# Patient Record
Sex: FEMALE | Race: WHITE
[De-identification: names, ages, dates, MRNs, and addresses within clinical notes are randomized per-mention and may not be internally consistent; named-entity substitution may affect disease eponyms.]

---

## 2018-04-11 ENCOUNTER — HOSPITAL ENCOUNTER (INPATIENT)
Dept: HOSPITAL 63 - GEROPSY | Age: 73
LOS: 24 days | Discharge: HOME HEALTH | DRG: 885 | End: 2018-05-05
Attending: PSYCHIATRY & NEUROLOGY | Admitting: PSYCHIATRY & NEUROLOGY
Payer: MEDICARE

## 2018-04-11 VITALS — HEIGHT: 60 IN | WEIGHT: 96.06 LBS | BODY MASS INDEX: 18.86 KG/M2

## 2018-04-11 VITALS — SYSTOLIC BLOOD PRESSURE: 172 MMHG | DIASTOLIC BLOOD PRESSURE: 75 MMHG

## 2018-04-11 DIAGNOSIS — F63.9: ICD-10-CM

## 2018-04-11 DIAGNOSIS — F41.9: ICD-10-CM

## 2018-04-11 DIAGNOSIS — F02.81: ICD-10-CM

## 2018-04-11 DIAGNOSIS — G40.409: ICD-10-CM

## 2018-04-11 DIAGNOSIS — J44.9: ICD-10-CM

## 2018-04-11 DIAGNOSIS — Z91.19: ICD-10-CM

## 2018-04-11 DIAGNOSIS — F10.10: ICD-10-CM

## 2018-04-11 DIAGNOSIS — G62.9: ICD-10-CM

## 2018-04-11 DIAGNOSIS — F01.51: ICD-10-CM

## 2018-04-11 DIAGNOSIS — E78.5: ICD-10-CM

## 2018-04-11 DIAGNOSIS — Z87.01: ICD-10-CM

## 2018-04-11 DIAGNOSIS — I10: ICD-10-CM

## 2018-04-11 DIAGNOSIS — G30.0: ICD-10-CM

## 2018-04-11 DIAGNOSIS — Z79.899: ICD-10-CM

## 2018-04-11 DIAGNOSIS — F42.9: ICD-10-CM

## 2018-04-11 DIAGNOSIS — F33.3: Primary | ICD-10-CM

## 2018-04-11 DIAGNOSIS — R45.851: ICD-10-CM

## 2018-04-11 DIAGNOSIS — N39.0: ICD-10-CM

## 2018-04-11 LAB
ALBUMIN SERPL-MCNC: 3.8 G/DL (ref 3.4–5)
ALBUMIN/GLOB SERPL: 1.2 {RATIO} (ref 1–1.7)
ALP SERPL-CCNC: 84 U/L (ref 46–116)
ALT SERPL-CCNC: 18 U/L (ref 14–59)
ANION GAP SERPL CALC-SCNC: 11 MMOL/L (ref 6–14)
APTT PPP: YELLOW S
AST SERPL-CCNC: 15 U/L (ref 15–37)
BACTERIA #/AREA URNS HPF: (no result) /HPF
BASOPHILS # BLD AUTO: 0 X10^3/UL (ref 0–0.2)
BASOPHILS NFR BLD: 1 % (ref 0–3)
BILIRUB SERPL-MCNC: 0.2 MG/DL (ref 0.2–1)
BILIRUB UR QL STRIP: (no result)
BUN/CREAT SERPL: 25 (ref 6–20)
CA-I SERPL ISE-MCNC: 20 MG/DL (ref 7–20)
CALCIUM SERPL-MCNC: 9.7 MG/DL (ref 8.5–10.1)
CHLORIDE SERPL-SCNC: 101 MMOL/L (ref 98–107)
CO2 SERPL-SCNC: 26 MMOL/L (ref 21–32)
CREAT SERPL-MCNC: 0.8 MG/DL (ref 0.6–1)
EOSINOPHIL NFR BLD: 0.1 X10^3/UL (ref 0–0.7)
EOSINOPHIL NFR BLD: 2 % (ref 0–3)
ERYTHROCYTE [DISTWIDTH] IN BLOOD BY AUTOMATED COUNT: 17.9 % (ref 11.5–14.5)
FIBRINOGEN PPP-MCNC: (no result) MG/DL
GFR SERPLBLD BASED ON 1.73 SQ M-ARVRAT: 70.7 ML/MIN
GLOBULIN SER-MCNC: 3.3 G/DL (ref 2.2–3.8)
GLUCOSE SERPL-MCNC: 92 MG/DL (ref 70–99)
GLUCOSE UR STRIP-MCNC: (no result) MG/DL
HCT VFR BLD CALC: 34.8 % (ref 36–47)
HGB BLD-MCNC: 11.8 G/DL (ref 12–15.5)
HYALINE CASTS #/AREA URNS LPF: (no result) /HPF
LYMPHOCYTES # BLD: 0.6 X10^3/UL (ref 1–4.8)
LYMPHOCYTES NFR BLD AUTO: 13 % (ref 24–48)
MAGNESIUM SERPL-MCNC: 2 MG/DL (ref 1.8–2.4)
MCH RBC QN AUTO: 33 PG (ref 25–35)
MCHC RBC AUTO-ENTMCNC: 34 G/DL (ref 31–37)
MCV RBC AUTO: 97 FL (ref 79–100)
MONO #: 0.5 X10^3/UL (ref 0–1.1)
MONOCYTES NFR BLD: 11 % (ref 0–9)
NEUT #: 3.1 X10^3UL (ref 1.8–7.7)
NEUTROPHILS NFR BLD AUTO: 72 % (ref 31–73)
NITRITE UR QL STRIP: (no result)
PLATELET # BLD AUTO: 258 X10^3/UL (ref 140–400)
POTASSIUM SERPL-SCNC: 4.3 MMOL/L (ref 3.5–5.1)
PROT SERPL-MCNC: 7.1 G/DL (ref 6.4–8.2)
RBC # BLD AUTO: 3.59 X10^6/UL (ref 3.5–5.4)
RBC #/AREA URNS HPF: (no result) /HPF (ref 0–2)
SODIUM SERPL-SCNC: 138 MMOL/L (ref 136–145)
SP GR UR STRIP: 1.01
SQUAMOUS #/AREA URNS LPF: (no result) /LPF
UROBILINOGEN UR-MCNC: 0.2 MG/DL
WBC # BLD AUTO: 4.2 X10^3/UL (ref 4–11)
WBC #/AREA URNS HPF: (no result) /HPF (ref 0–4)
YEAST #/AREA URNS HPF: PRESENT /HPF

## 2018-04-11 PROCEDURE — 84443 ASSAY THYROID STIM HORMONE: CPT

## 2018-04-11 PROCEDURE — 82947 ASSAY GLUCOSE BLOOD QUANT: CPT

## 2018-04-11 PROCEDURE — 81001 URINALYSIS AUTO W/SCOPE: CPT

## 2018-04-11 PROCEDURE — 84480 ASSAY TRIIODOTHYRONINE (T3): CPT

## 2018-04-11 PROCEDURE — 80053 COMPREHEN METABOLIC PANEL: CPT

## 2018-04-11 PROCEDURE — 36415 COLL VENOUS BLD VENIPUNCTURE: CPT

## 2018-04-11 PROCEDURE — 95816 EEG AWAKE AND DROWSY: CPT

## 2018-04-11 PROCEDURE — 74176 CT ABD & PELVIS W/O CONTRAST: CPT

## 2018-04-11 PROCEDURE — 83605 ASSAY OF LACTIC ACID: CPT

## 2018-04-11 PROCEDURE — 86593 SYPHILIS TEST NON-TREP QUANT: CPT

## 2018-04-11 PROCEDURE — 80048 BASIC METABOLIC PNL TOTAL CA: CPT

## 2018-04-11 PROCEDURE — 87186 SC STD MICRODIL/AGAR DIL: CPT

## 2018-04-11 PROCEDURE — 71045 X-RAY EXAM CHEST 1 VIEW: CPT

## 2018-04-11 PROCEDURE — 85025 COMPLETE CBC W/AUTO DIFF WBC: CPT

## 2018-04-11 PROCEDURE — 83540 ASSAY OF IRON: CPT

## 2018-04-11 PROCEDURE — 80061 LIPID PANEL: CPT

## 2018-04-11 PROCEDURE — 84436 ASSAY OF TOTAL THYROXINE: CPT

## 2018-04-11 PROCEDURE — 82306 VITAMIN D 25 HYDROXY: CPT

## 2018-04-11 PROCEDURE — 83550 IRON BINDING TEST: CPT

## 2018-04-11 PROCEDURE — 80183 DRUG SCRN QUANT OXCARBAZEPIN: CPT

## 2018-04-11 PROCEDURE — 83735 ASSAY OF MAGNESIUM: CPT

## 2018-04-11 PROCEDURE — 80175 DRUG SCREEN QUAN LAMOTRIGINE: CPT

## 2018-04-11 PROCEDURE — 82607 VITAMIN B-12: CPT

## 2018-04-11 PROCEDURE — 74018 RADEX ABDOMEN 1 VIEW: CPT

## 2018-04-11 PROCEDURE — 82803 BLOOD GASES ANY COMBINATION: CPT

## 2018-04-11 PROCEDURE — 83036 HEMOGLOBIN GLYCOSYLATED A1C: CPT

## 2018-04-11 PROCEDURE — 87086 URINE CULTURE/COLONY COUNT: CPT

## 2018-04-11 RX ADMIN — DICYCLOMINE HYDROCHLORIDE SCH MG: 10 CAPSULE ORAL at 22:49

## 2018-04-11 RX ADMIN — DONEPEZIL HYDROCHLORIDE SCH MG: 5 TABLET, FILM COATED ORAL at 22:49

## 2018-04-11 RX ADMIN — LAMOTRIGINE SCH MG: 150 TABLET ORAL at 22:49

## 2018-04-11 RX ADMIN — SULFAMETHOXAZOLE AND TRIMETHOPRIM SCH TAB: 800; 160 TABLET ORAL at 22:49

## 2018-04-11 NOTE — PDOC
Exam


Note:


Sanchez Note:


Please also refer to the separate dictated note~for this date of service 

dictated separately.~Patient seen individually. Discussed the patient with 

Nursing staff reviewed the chart.~Reviewed interim history and current 

functioning. Reviewed vital signs,~Labs/ Radiology~and current medications 

noted below. Continue current treatment with the changes noted in the dictated 

addendum note





Assessment:


Vital Signs:





 Vital Signs








  Date Time  Temp Pulse Resp B/P (MAP) Pulse Ox O2 Delivery O2 Flow Rate FiO2


 


4/11/18 19:56 98.0 67 20 172/75 (107) 94   











Current Medications:


Meds:





Current Medications


Acetaminophen (Tylenol) 650 mg PRN Q6HRS  PRN PO PAIN / TEMP;  Start 4/11/18 at 

19:30


Multi-Ingredient Ointment (Analgesic Balm) 1 logan PRN QID  PRN TP MUSCLE PAIN;  

Start 4/11/18 at 19:30


Al Hydroxide/Mg Hydroxide (Mylanta Plus Xs) 15 ml PRN AFTMEALHC  PRN PO 

DYSPEPSIA;  Start 4/11/18 at 19:30


Magnesium Hydroxide (Milk Of Magnesia) 2,400 mg PRN QHS  PRN PO CONSTIPATION;  

Start 4/11/18 at 19:30


I have reviewed the current psychotropics carefully including drug 

interactions.  Risk benefit ratio favors no change other than as noted in my 

dictated progress note.











NEGAR DANIEL MD Apr 11, 2018 20:51

## 2018-04-12 VITALS — SYSTOLIC BLOOD PRESSURE: 141 MMHG | DIASTOLIC BLOOD PRESSURE: 71 MMHG

## 2018-04-12 VITALS — DIASTOLIC BLOOD PRESSURE: 85 MMHG | SYSTOLIC BLOOD PRESSURE: 159 MMHG

## 2018-04-12 VITALS — DIASTOLIC BLOOD PRESSURE: 74 MMHG | SYSTOLIC BLOOD PRESSURE: 179 MMHG

## 2018-04-12 LAB
CHOLEST SERPL-MCNC: 253 MG/DL (ref 0–200)
CHOLEST/HDLC SERPL: 2 {RATIO}
HDLC SERPL-MCNC: 104 MG/DL (ref 40–60)
LDLC: 135 MG/DL (ref 0–100)
T3 SERPL-MCNC: 71 NG/DL (ref 71–180)
T4 SERPL-MCNC: 5.9 UG/DL (ref 4.5–12)
THYROID STIM HORMONE (TSH): 1.38 UIU/ML (ref 0.36–3.74)
TRIGL SERPL-MCNC: 74 MG/DL (ref 0–150)
VLDLC: 14 MG/DL (ref 0–40)

## 2018-04-12 RX ADMIN — Medication SCH CAP: at 10:28

## 2018-04-12 RX ADMIN — DONEPEZIL HYDROCHLORIDE SCH MG: 5 TABLET, FILM COATED ORAL at 20:04

## 2018-04-12 RX ADMIN — ACETAMINOPHEN PRN MG: 325 TABLET, FILM COATED ORAL at 10:52

## 2018-04-12 RX ADMIN — Medication SCH CAP: at 20:05

## 2018-04-12 RX ADMIN — LOSARTAN POTASSIUM SCH MG: 50 TABLET, FILM COATED ORAL at 10:29

## 2018-04-12 RX ADMIN — ACETAMINOPHEN PRN MG: 325 TABLET, FILM COATED ORAL at 21:22

## 2018-04-12 RX ADMIN — SULFAMETHOXAZOLE AND TRIMETHOPRIM SCH TAB: 800; 160 TABLET ORAL at 20:05

## 2018-04-12 RX ADMIN — FLUTICASONE PROPIONATE SCH SPRAY: 50 SPRAY, METERED NASAL at 10:30

## 2018-04-12 RX ADMIN — MINOXIDIL SCH MG: 2.5 TABLET ORAL at 10:30

## 2018-04-12 RX ADMIN — SULFAMETHOXAZOLE AND TRIMETHOPRIM SCH TAB: 800; 160 TABLET ORAL at 10:28

## 2018-04-12 RX ADMIN — PANTOPRAZOLE SODIUM SCH MG: 40 TABLET, DELAYED RELEASE ORAL at 10:29

## 2018-04-12 RX ADMIN — NICOTINE SCH PATCH: 14 PATCH, EXTENDED RELEASE TOPICAL at 10:28

## 2018-04-12 RX ADMIN — LAMOTRIGINE SCH MG: 150 TABLET ORAL at 10:27

## 2018-04-12 RX ADMIN — DICYCLOMINE HYDROCHLORIDE SCH MG: 10 CAPSULE ORAL at 10:28

## 2018-04-12 RX ADMIN — CITALOPRAM HYDROBROMIDE SCH MG: 20 TABLET ORAL at 10:28

## 2018-04-12 RX ADMIN — LAMOTRIGINE SCH MG: 150 TABLET ORAL at 20:05

## 2018-04-12 RX ADMIN — DICYCLOMINE HYDROCHLORIDE SCH MG: 10 CAPSULE ORAL at 20:05

## 2018-04-12 NOTE — HP
ADMIT DATE:  04/12/2018



This note covers elements not covered in my initial note of 04/12/2018.  The

patient was seen individually evening of 04/12/2018.  Discussed with nursing

staff, reviewed the chart.  Previously, I had discussed with nursing staff on 2

or 3 occasions to review history resulting in this referral to us from [facility].  The patient was initially sent to the Emergency Room

at [facility] on the [address] and then admitted to inpatient service before we were

called on account of her suicidal ideation.



IDENTIFYING DATA:  The patient is a 72-year-old  female who resides at

[facility].  She has been increasingly confused, recently

getting combative, hitting, scratching at staff, verbally abusive to staff.  She

banged her head on the wall and made suicidal statements.  She said she had a

suicidal plan.  Has been noted to be very needy, disruptive, attention seeking,

depressed, psychotic and confused.  She has failed outpatient psychiatric

interventions resulting in this referral.



CHIEF COMPLAINT:  "I get frustrated very easily."



HISTORY OF PRESENT ILLNESS:  The patient has a history of worsening symptoms of

depression, feeling hopeless, helpless, worthless, paranoia, marked agitation,

mood swings, worsening confusion.  She has had some sleep and appetite changes,

appeared paranoid.  No active homicidal ideation.  No clear history of bipolar

disorder.  She has been increasingly confused.



PAST PSYCHIATRIC HISTORY:  As above.



MEDICAL HISTORY:  COPD, hypertension, hyperlipidemia, neuropathy, seizure

disorder, history of UTIs, history of pneumonia.  



ACCU-CHEKS:  None.



DIET:  Regular.  Takes her medications whole, ambulates wheelchair with 1 person

assist.



CODE STATUS:  Full code.



DRUG ALLERGIES:  Negative.



CURRENT PSYCHOTROPICS:  Aricept 5 mg at bedtime, Lexapro 10 mg a day, Ativan

0.75 mg b.i.d. and 1 mg at bedtime, Zyprexa 5 mg b.i.d. p.r.n.



FAMILY HISTORY:  Noncontributory.



SOCIAL HISTORY:  Positive history of alcohol abuse, though she is unable to tell

me details as I questioned her on this.  She states she worked and lived most of

her life in [facility] moved to this area to get away from the crowded space

in that area.  No physical, sexual or elder abuse history is noted.  She is not

known to be a perpetrator.



MENTAL STATUS EXAMINATION:  The patient was seen individually evening of

04/12/2018.  She is oriented to herself and situation, knew that she was

admitted the previous evening, knew it was 04/2018.  Short term memory

nevertheless is somewhat impaired.  Attention span short.  Mood is depressed,

anxious.  She is paranoid, suspicious.  Denies active suicidal ideation. 

Attention span short.  Language function intact.  



REACTION TO HOSPITALIZATION:  The patient accepting of this.



ASSETS:  Stable living at the above facility, supportive family.



IMPRESSION:  Major depressive disorder, recurrent, severe with psychotic

features; major neurocognitive disorder, early Alzheimer, vascular with

delusion, depression; anxiety disorder, unspecified; impulse control disorder,

unspecified.  Rest as above.



PLAN:  Admit to geropsychiatry unit at [facility].  I will see the

patient daily individually from a psychiatric standpoint.  Medical followup per

Dr. Nelson/Dr Garrison.  Continue current psychotropics, observe baseline and

adjust as clinically indicated.





______________________________

MAN IRINEO DANIEL MD



DR:  REMY/cherrie  JOB#:  [id number] / [id number]

DD:  04/12/2018 18:38  DT:  04/12/2018 19:05

## 2018-04-12 NOTE — PDOC
Exam


Note:


Sanchez Note:


Please also refer to the separate dictated note~for this date of service 

dictated separately.~Patient seen individually. Discussed the patient with 

Nursing staff reviewed the chart.~Reviewed interim history and current 

functioning. Reviewed vital signs,~Labs/ Radiology~and current medications 

noted below. Continue current treatment with the changes noted in the dictated 

addendum note





Assessment:


Vital Signs:





 Vital Signs








  Date Time  Temp Pulse Resp B/P (MAP) Pulse Ox O2 Delivery O2 Flow Rate FiO2


 


4/12/18 15:36 97.6 84 16 141/71 (94) 95   








I&O











Intake and Output 


 


 4/12/18





 07:00


 


Intake Total 240 ml


 


Output Total 800 ml


 


Balance -560 ml


 


 


 


Intake Oral 240 ml


 


Output Urine Total 800 ml


 


# Voids 1


 


# Bowel Movements 1








Labs:





 Laboratory Tests








Test


  4/11/18


19:50 4/11/18


21:00


 


White Blood Count


  4.2 x10^3/uL


(4.0-11.0) 


 


 


Red Blood Count


  3.59 x10^6/uL


(3.50-5.40) 


 


 


Hemoglobin


  11.8 g/dL


(12.0-15.5)  L 


 


 


Hematocrit


  34.8 %


(36.0-47.0)  L 


 


 


Mean Corpuscular Volume


  97 fL ()


  


 


 


Mean Corpuscular Hemoglobin 33 pg (25-35)   


 


Mean Corpuscular Hemoglobin


Concent 34 g/dL


(31-37) 


 


 


Red Cell Distribution Width


  17.9 %


(11.5-14.5)  H 


 


 


Platelet Count


  258 x10^3/uL


(140-400) 


 


 


Neutrophils (%) (Auto) 72 % (31-73)   


 


Lymphocytes (%) (Auto) 13 % (24-48)  L 


 


Monocytes (%) (Auto) 11 % (0-9)  H 


 


Eosinophils (%) (Auto) 2 % (0-3)   


 


Basophils (%) (Auto) 1 % (0-3)   


 


Neutrophils # (Auto)


  3.1 x10^3uL


(1.8-7.7) 


 


 


Lymphocytes # (Auto)


  0.6 x10^3/uL


(1.0-4.8)  L 


 


 


Monocytes # (Auto)


  0.5 x10^3/uL


(0.0-1.1) 


 


 


Eosinophils # (Auto)


  0.1 x10^3/uL


(0.0-0.7) 


 


 


Basophils # (Auto)


  0.0 x10^3/uL


(0.0-0.2) 


 


 


Sodium Level


  138 mmol/L


(136-145) 


 


 


Potassium Level


  4.3 mmol/L


(3.5-5.1) 


 


 


Chloride Level


  101 mmol/L


() 


 


 


Carbon Dioxide Level


  26 mmol/L


(21-32) 


 


 


Anion Gap 11 (6-14)   


 


Blood Urea Nitrogen


  20 mg/dL


(7-20) 


 


 


Creatinine


  0.8 mg/dL


(0.6-1.0) 


 


 


Estimated GFR


(Cockcroft-Gault) 70.7  


  


 


 


BUN/Creatinine Ratio 25 (6-20)  H 


 


Glucose Level


  92 mg/dL


(70-99) 


 


 


Calcium Level


  9.7 mg/dL


(8.5-10.1) 


 


 


Magnesium Level


  2.0 mg/dL


(1.8-2.4) 


 


 


Total Bilirubin


  0.2 mg/dL


(0.2-1.0) 


 


 


Aspartate Amino Transferase


(AST) 15 U/L (15-37)


  


 


 


Alanine Aminotransferase (ALT)


  18 U/L (14-59)


  


 


 


Alkaline Phosphatase


  84 U/L


() 


 


 


Total Protein


  7.1 g/dL


(6.4-8.2) 


 


 


Albumin


  3.8 g/dL


(3.4-5.0) 


 


 


Albumin/Globulin Ratio 1.2 (1.0-1.7)   


 


Urine Collection Type  Unknown  


 


Urine Color  Yellow  


 


Urine Clarity  Hazy  


 


Urine pH  7.0  


 


Urine Specific Gravity  1.015  


 


Urine Protein


  


  Neg


(NEG-TRACE)


 


Urine Glucose (UA)


  


  Neg mg/dL


(NEG)


 


Urine Ketones (Stick)


  


  Neg mg/dL


(NEG)


 


Urine Blood  Trace (NEG)  


 


Urine Nitrite  Pos (NEG)  


 


Urine Bilirubin  Neg (NEG)  


 


Urine Urobilinogen Dipstick


  


  0.2 mg/dL (0.2


mg/dL)


 


Urine Leukocyte Esterase  Small (NEG)  


 


Urine RBC


  


  3-5 /HPF (0-2)


 


 


Urine WBC


  


  5-10 /HPF


(0-4)


 


Urine Squamous Epithelial


Cells 


  Few /LPF  


 


 


Urine Bacteria


  


  Mod /HPF


(0-FEW)


 


Urine Hyaline Casts  Few /HPF  


 


Urine Mucus  Slight /LPF  


 


Urine Yeast  Present /HPF  











Current Medications:


Meds:





Current Medications


Acetaminophen (Tylenol) 650 mg PRN Q6HRS  PRN PO PAIN / TEMP Last administered 

on 4/12/18at 10:52;  Start 4/11/18 at 19:30


Multi-Ingredient Ointment (Analgesic Balm) 1 logan PRN QID  PRN TP MUSCLE PAIN;  

Start 4/11/18 at 19:30


Al Hydroxide/Mg Hydroxide (Mylanta Plus Xs) 15 ml PRN AFTMEALHC  PRN PO 

DYSPEPSIA;  Start 4/11/18 at 19:30


Magnesium Hydroxide (Milk Of Magnesia) 2,400 mg PRN QHS  PRN PO CONSTIPATION;  

Start 4/11/18 at 19:30


Donepezil HCl (Aricept) 5 mg HS PO  Last administered on 4/11/18at 22:49;  

Start 4/11/18 at 23:00


Lorazepam (Ativan) 0.75 mg BID94 PO  Last administered on 4/12/18at 16:03;  

Start 4/12/18 at 09:00


Lorazepam (Ativan) 1 mg HS PO  Last administered on 4/11/18at 22:49;  Start 4/11 /18 at 23:00


Olanzapine (ZyPREXA) 5 mg PRN BID  PRN PO ANXIETY / AGITATION;  Start 4/11/18 

at 22:00


Dicyclomine HCl (Bentyl) 10 mg BID PO  Last administered on 4/12/18at 10:28;  

Start 4/11/18 at 23:00


Fluticasone Propionate (Flonase) 2 spray DAILY NS  Last administered on 4/12/ 18at 10:30;  Start 4/12/18 at 09:00


Albuterol/ Ipratropium (Duoneb) 3 ml PRN Q4HRS  PRN NEB SHORTNESS OF BREATH;  

Start 4/11/18 at 22:00


Lamotrigine (LaMICtal) 150 mg BID PO  Last administered on 4/12/18at 10:27;  

Start 4/11/18 at 23:00


Losartan Potassium (Cozaar) 50 mg DAILY PO  Last administered on 4/12/18at 10:29

;  Start 4/12/18 at 09:00


Minoxidil (Loniten) 10 mg DAILY PO  Last administered on 4/12/18at 10:30;  

Start 4/12/18 at 09:00


Nicotine (Nicoderm Cq 14mg) 1 patch DAILY TD  Last administered on 4/12/18at 10:

28;  Start 4/12/18 at 09:00


Oxcarbazepine (Trileptal) 600 mg BID PO  Last administered on 4/12/18at 10:28;  

Start 4/12/18 at 09:00


Pantoprazole Sodium (Protonix) 40 mg DAILY PO  Last administered on 4/12/18at 10

:29;  Start 4/12/18 at 09:00


Trimethoprim/ Sulfamethoxazole (Bactrim Ds) 1 tab BID PO  Last administered on 4 /12/18at 10:28;  Start 4/11/18 at 23:00


Citalopram Hydrobromide (CeleXA) 20 mg DAILY PO  Last administered on 4/12/18at 

10:28;  Start 4/12/18 at 09:00


Lactobacillus Rhamnosus (Culturelle) 1 cap BID PO  Last administered on 4/12/ 18at 10:28;  Start 4/12/18 at 09:00





Active Scripts


Active


Reported


Bactrim Ds Tablet (Sulfamethoxazole/Trimethoprim) 1 Each Tablet 1 Each PO BID


Pantoprazole Sodium 40 Mg Tablet.dr 40 Mg PO DAILY


Trileptal (Oxcarbazepine) 300 Mg Tablet 600 Mg PO BID


Zyprexa (Olanzapine) 5 Mg Tablet 5 Mg PO PRN BID PRN


NICODERM CQ 14mg (Nicotine) 1 Each Patch.td24 1 Patch TD DAILY


Minoxidil 2.5 Mg Tablet 10 Mg PO DAILY


Cozaar (Losartan Potassium) 50 Mg Tablet 50 Mg PO DAILY


Lorazepam 1 Mg Tablet 1 Mg PO HS


Lorazepam 0.5 Mg Tablet 0.75 Mg PO BID94


Lamictal (Lamotrigine) 150 Mg Tablet 150 Mg PO BID


Probiotic (Lactobacillus Acidophilus) 1 Each Capsule 1 Each PO TIDWMEALS


Duoneb 0.5-3(2.5) Mg/3 Ml (Albuterol/Ipratropium) 3 Ml Ampul.neb 3 Ml NEB PRN 

Q4HRS PRN


Fluticasone Propionate Nasal Spray (Fluticasone Propionate) 16 Gm Spray.susp 2 

[place] NS DAILY


Escitalopram Oxalate 10 Mg Tablet 10 Mg PO DAILY


Donepezil Hcl 5 Mg Tablet 5 Mg PO HS


Dicyclomine Hcl 10 Mg Capsule 10 Mg PO BID


I have reviewed the current psychotropics carefully including drug 

interactions.  Risk benefit ratio favors no change other than as noted in my 

dictated progress note.





Diagnosis:


Problems:  


(1) Anxiety disorder


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


(3) Dementia in Alzheimer's disease with depression


(4) Impulse control disorder


(5) Dementia, vascular, with delusions


(6) Dementia, vascular, with depression


(7) Major depressive disorder, recurrent episode


(8) Psychotic depression











NEGAR DANIEL MD Apr 12, 2018 18:28

## 2018-04-12 NOTE — PDOC
Exam


Note:


Sanchez Note:


Please also refer to the separate dictated note~for this date of service 

dictated separately.~Patient seen individually. Discussed the patient with 

Nursing staff reviewed the chart.~Reviewed interim history and current 

functioning. Reviewed vital signs,~Labs/ Radiology~and current medications 

noted below. Continue current treatment with the changes noted in the dictated 

addendum note





Assessment:


Vital Signs:





 Vital Signs








  Date Time  Temp Pulse Resp B/P (MAP) Pulse Ox O2 Delivery O2 Flow Rate FiO2


 


4/12/18 15:36 97.6 84 16 141/71 (94) 95   








I&O











Intake and Output 


 


 4/12/18





 07:00


 


Intake Total 240 ml


 


Output Total 800 ml


 


Balance -560 ml


 


 


 


Intake Oral 240 ml


 


Output Urine Total 800 ml


 


# Voids 1


 


# Bowel Movements 1








Labs:





 Laboratory Tests








Test


  4/11/18


21:00


 


Urine Collection Type Unknown  


 


Urine Color Yellow  


 


Urine Clarity Hazy  


 


Urine pH 7.0  


 


Urine Specific Gravity 1.015  


 


Urine Protein


  Neg


(NEG-TRACE)


 


Urine Glucose (UA)


  Neg mg/dL


(NEG)


 


Urine Ketones (Stick)


  Neg mg/dL


(NEG)


 


Urine Blood Trace (NEG)  


 


Urine Nitrite Pos (NEG)  


 


Urine Bilirubin Neg (NEG)  


 


Urine Urobilinogen Dipstick


  0.2 mg/dL (0.2


mg/dL)


 


Urine Leukocyte Esterase Small (NEG)  


 


Urine RBC


  3-5 /HPF (0-2)


 


 


Urine WBC


  5-10 /HPF


(0-4)


 


Urine Squamous Epithelial


Cells Few /LPF  


 


 


Urine Bacteria


  Mod /HPF


(0-FEW)


 


Urine Hyaline Casts Few /HPF  


 


Urine Mucus Slight /LPF  


 


Urine Yeast Present /HPF  











Current Medications:


Meds:





Current Medications


Acetaminophen (Tylenol) 650 mg PRN Q6HRS  PRN PO PAIN / TEMP Last administered 

on 4/12/18at 10:52;  Start 4/11/18 at 19:30


Multi-Ingredient Ointment (Analgesic Balm) 1 logan PRN QID  PRN TP MUSCLE PAIN;  

Start 4/11/18 at 19:30


Al Hydroxide/Mg Hydroxide (Mylanta Plus Xs) 15 ml PRN AFTMEALHC  PRN PO 

DYSPEPSIA;  Start 4/11/18 at 19:30


Magnesium Hydroxide (Milk Of Magnesia) 2,400 mg PRN QHS  PRN PO CONSTIPATION;  

Start 4/11/18 at 19:30


Donepezil HCl (Aricept) 5 mg HS PO  Last administered on 4/12/18at 20:04;  

Start 4/11/18 at 23:00


Lorazepam (Ativan) 0.75 mg BID94 PO  Last administered on 4/12/18at 16:03;  

Start 4/12/18 at 09:00


Lorazepam (Ativan) 1 mg HS PO  Last administered on 4/12/18at 20:04;  Start 4/11 /18 at 23:00


Olanzapine (ZyPREXA) 5 mg PRN BID  PRN PO ANXIETY / AGITATION;  Start 4/11/18 

at 22:00


Dicyclomine HCl (Bentyl) 10 mg BID PO  Last administered on 4/12/18at 20:05;  

Start 4/11/18 at 23:00


Fluticasone Propionate (Flonase) 2 spray DAILY NS  Last administered on 4/12/ 18at 10:30;  Start 4/12/18 at 09:00


Albuterol/ Ipratropium (Duoneb) 3 ml PRN Q4HRS  PRN NEB SHORTNESS OF BREATH;  

Start 4/11/18 at 22:00


Lamotrigine (LaMICtal) 150 mg BID PO  Last administered on 4/12/18at 20:05;  

Start 4/11/18 at 23:00


Losartan Potassium (Cozaar) 50 mg DAILY PO  Last administered on 4/12/18at 10:29

;  Start 4/12/18 at 09:00


Minoxidil (Loniten) 10 mg DAILY PO  Last administered on 4/12/18at 10:30;  

Start 4/12/18 at 09:00


Nicotine (Nicoderm Cq 14mg) 1 patch DAILY TD  Last administered on 4/12/18at 10:

28;  Start 4/12/18 at 09:00


Oxcarbazepine (Trileptal) 600 mg BID PO  Last administered on 4/12/18at 20:05;  

Start 4/12/18 at 09:00


Pantoprazole Sodium (Protonix) 40 mg DAILY PO  Last administered on 4/12/18at 10

:29;  Start 4/12/18 at 09:00


Trimethoprim/ Sulfamethoxazole (Bactrim Ds) 1 tab BID PO  Last administered on 4 /12/18at 20:05;  Start 4/11/18 at 23:00


Citalopram Hydrobromide (CeleXA) 20 mg DAILY PO  Last administered on 4/12/18at 

10:28;  Start 4/12/18 at 09:00


Lactobacillus Rhamnosus (Culturelle) 1 cap BID PO  Last administered on 4/12/ 18at 20:05;  Start 4/12/18 at 09:00





Active Scripts


Active


Reported


Bactrim Ds Tablet (Sulfamethoxazole/Trimethoprim) 1 Each Tablet 1 Each PO BID


Pantoprazole Sodium 40 Mg Tablet.dr 40 Mg PO DAILY


Trileptal (Oxcarbazepine) 300 Mg Tablet 600 Mg PO BID


Zyprexa (Olanzapine) 5 Mg Tablet 5 Mg PO PRN BID PRN


NICODERM CQ 14mg (Nicotine) 1 Each Patch.td24 1 Patch TD DAILY


Minoxidil 2.5 Mg Tablet 10 Mg PO DAILY


Cozaar (Losartan Potassium) 50 Mg Tablet 50 Mg PO DAILY


Lorazepam 1 Mg Tablet 1 Mg PO HS


Lorazepam 0.5 Mg Tablet 0.75 Mg PO BID94


Lamictal (Lamotrigine) 150 Mg Tablet 150 Mg PO BID


Probiotic (Lactobacillus Acidophilus) 1 Each Capsule 1 Each PO TIDWMEALS


Duoneb 0.5-3(2.5) Mg/3 Ml (Albuterol/Ipratropium) 3 Ml Ampul.neb 3 Ml NEB PRN 

Q4HRS PRN


Fluticasone Propionate Nasal Spray (Fluticasone Propionate) 16 Gm Spray.susp 2 

[place] NS DAILY


Escitalopram Oxalate 10 Mg Tablet 10 Mg PO DAILY


Donepezil Hcl 5 Mg Tablet 5 Mg PO HS


Dicyclomine Hcl 10 Mg Capsule 10 Mg PO BID


I have reviewed the current psychotropics carefully including drug 

interactions.  Risk benefit ratio favors no change other than as noted in my 

dictated progress note.





Diagnosis:


Problems:  


(1) Anxiety disorder


(2) Impulse control disorder


(3) Psychotic depression


(4) Major depressive disorder, recurrent episode


(5) Dementia, vascular, with depression


(6) Dementia, vascular, with delusions


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











NEGAR DANIEL MD Apr 12, 2018 20:52

## 2018-04-13 VITALS — DIASTOLIC BLOOD PRESSURE: 76 MMHG | SYSTOLIC BLOOD PRESSURE: 162 MMHG

## 2018-04-13 VITALS — DIASTOLIC BLOOD PRESSURE: 92 MMHG | SYSTOLIC BLOOD PRESSURE: 146 MMHG

## 2018-04-13 LAB — HBA1C MFR BLD: 4.8 % (ref 4.8–5.6)

## 2018-04-13 RX ADMIN — LAMOTRIGINE SCH MG: 150 TABLET ORAL at 19:42

## 2018-04-13 RX ADMIN — ACETAMINOPHEN PRN MG: 325 TABLET, FILM COATED ORAL at 20:12

## 2018-04-13 RX ADMIN — DONEPEZIL HYDROCHLORIDE SCH MG: 5 TABLET, FILM COATED ORAL at 19:42

## 2018-04-13 RX ADMIN — FLUTICASONE PROPIONATE SCH SPRAY: 50 SPRAY, METERED NASAL at 09:02

## 2018-04-13 RX ADMIN — SULFAMETHOXAZOLE AND TRIMETHOPRIM SCH TAB: 800; 160 TABLET ORAL at 09:00

## 2018-04-13 RX ADMIN — Medication SCH CAP: at 09:00

## 2018-04-13 RX ADMIN — Medication SCH CAP: at 19:42

## 2018-04-13 RX ADMIN — CITALOPRAM HYDROBROMIDE SCH MG: 20 TABLET ORAL at 09:01

## 2018-04-13 RX ADMIN — CIPROFLOXACIN HYDROCHLORIDE SCH MG: 250 TABLET, FILM COATED ORAL at 19:44

## 2018-04-13 RX ADMIN — NICOTINE SCH PATCH: 14 PATCH, EXTENDED RELEASE TOPICAL at 09:00

## 2018-04-13 RX ADMIN — FLUTICASONE PROPIONATE SCH SPRAY: 50 SPRAY, METERED NASAL at 09:00

## 2018-04-13 RX ADMIN — LAMOTRIGINE SCH MG: 150 TABLET ORAL at 09:01

## 2018-04-13 RX ADMIN — PANTOPRAZOLE SODIUM SCH MG: 40 TABLET, DELAYED RELEASE ORAL at 09:00

## 2018-04-13 RX ADMIN — ACETAMINOPHEN PRN MG: 325 TABLET, FILM COATED ORAL at 10:20

## 2018-04-13 RX ADMIN — DICYCLOMINE HYDROCHLORIDE SCH MG: 10 CAPSULE ORAL at 19:42

## 2018-04-13 RX ADMIN — DICYCLOMINE HYDROCHLORIDE SCH MG: 10 CAPSULE ORAL at 09:00

## 2018-04-13 RX ADMIN — LOSARTAN POTASSIUM SCH MG: 50 TABLET, FILM COATED ORAL at 09:01

## 2018-04-13 RX ADMIN — AMOXICILLIN SCH MG: 250 CAPSULE ORAL at 19:44

## 2018-04-13 RX ADMIN — MINOXIDIL SCH MG: 2.5 TABLET ORAL at 09:02

## 2018-04-13 NOTE — PDOC
Exam


Note:


Sanchez Note:


Please also refer to the separate dictated note~for this date of service 

dictated separately.~Patient seen individually. Discussed the patient with 

Nursing staff reviewed the chart.~Reviewed interim history and current 

functioning. Reviewed vital signs,~Labs/ Radiology~and current medications 

noted below. Continue current treatment with the changes noted in the dictated 

addendum note





Assessment:


Vital Signs:





 Vital Signs








  Date Time  Temp Pulse Resp B/P (MAP) Pulse Ox O2 Delivery O2 Flow Rate FiO2


 


4/13/18 16:13 99.0 86 16 146/92 (110) 95   








I&O











Intake and Output 


 


 4/13/18





 07:00


 


Intake Total 600 ml


 


Balance 600 ml


 


 


 


Intake Oral 600 ml


 


# Bowel Movements 1











Current Medications:


Meds:





Current Medications


Acetaminophen (Tylenol) 650 mg PRN Q6HRS  PRN PO PAIN / TEMP Last administered 

on 4/13/18at 20:12;  Start 4/11/18 at 19:30


Multi-Ingredient Ointment (Analgesic Balm) 1 logan PRN QID  PRN TP MUSCLE PAIN;  

Start 4/11/18 at 19:30


Al Hydroxide/Mg Hydroxide (Mylanta Plus Xs) 15 ml PRN AFTMEALHC  PRN PO 

DYSPEPSIA;  Start 4/11/18 at 19:30


Magnesium Hydroxide (Milk Of Magnesia) 2,400 mg PRN QHS  PRN PO CONSTIPATION;  

Start 4/11/18 at 19:30


Donepezil HCl (Aricept) 5 mg HS PO  Last administered on 4/13/18at 19:42;  

Start 4/11/18 at 23:00


Lorazepam (Ativan) 0.75 mg BID94 PO  Last administered on 4/13/18at 16:31;  

Start 4/12/18 at 09:00


Lorazepam (Ativan) 1 mg HS PO  Last administered on 4/12/18at 20:04;  Start 4/11 /18 at 23:00;  Stop 4/13/18 at 18:25;  Status DC


Olanzapine (ZyPREXA) 5 mg PRN BID  PRN PO ANXIETY / AGITATION;  Start 4/11/18 

at 22:00


Dicyclomine HCl (Bentyl) 10 mg BID PO  Last administered on 4/13/18at 19:42;  

Start 4/11/18 at 23:00


Fluticasone Propionate (Flonase) 2 spray DAILY NS  Last administered on 4/12/ 18at 10:30;  Start 4/12/18 at 09:00


Albuterol/ Ipratropium (Duoneb) 3 ml PRN Q4HRS  PRN NEB SHORTNESS OF BREATH;  

Start 4/11/18 at 22:00


Lamotrigine (LaMICtal) 150 mg BID PO  Last administered on 4/13/18at 19:42;  

Start 4/11/18 at 23:00


Losartan Potassium (Cozaar) 50 mg DAILY PO  Last administered on 4/13/18at 09:01

;  Start 4/12/18 at 09:00


Minoxidil (Loniten) 10 mg DAILY PO  Last administered on 4/13/18at 09:02;  

Start 4/12/18 at 09:00


Nicotine (Nicoderm Cq 14mg) 1 patch DAILY TD  Last administered on 4/12/18at 10:

28;  Start 4/12/18 at 09:00


Oxcarbazepine (Trileptal) 600 mg BID PO  Last administered on 4/13/18at 19:42;  

Start 4/12/18 at 09:00


Pantoprazole Sodium (Protonix) 40 mg DAILY PO  Last administered on 4/13/18at 09

:00;  Start 4/12/18 at 09:00


Trimethoprim/ Sulfamethoxazole (Bactrim Ds) 1 tab BID PO  Last administered on 4 /13/18at 09:00;  Start 4/11/18 at 23:00;  Stop 4/13/18 at 16:19;  Status DC


Citalopram Hydrobromide (CeleXA) 20 mg DAILY PO  Last administered on 4/13/18at 

09:01;  Start 4/12/18 at 09:00


Lactobacillus Rhamnosus (Culturelle) 1 cap BID PO  Last administered on 4/13/ 18at 19:42;  Start 4/12/18 at 09:00


Amoxicillin (Amoxil) 500 mg [id number] PO  Last administered on 4/13/18at 19:44;  

Start 4/13/18 at 21:00


Ciprofloxacin (Cipro) 250 mg BID PO  Last administered on 4/13/18at 19:44;  

Start 4/13/18 at 21:00


Lactobacillus Rhamnosus (Culturelle) 1 cap BID PO ;  Start 4/13/18 at 21:00;  

Stop 4/13/18 at 21:00;  Status DC


Lorazepam (Ativan) 0.75 mg HS PO ;  Start 4/14/18 at 21:00


Lorazepam (Ativan) 1 mg QHS PO  Last administered on 4/13/18at 19:42;  Start 4/ 13/18 at 21:00;  Stop 4/14/18 at 20:30





Active Scripts


Active


Reported


Bactrim Ds Tablet (Sulfamethoxazole/Trimethoprim) 1 Each Tablet 1 Each PO BID


Pantoprazole Sodium 40 Mg Tablet.dr 40 Mg PO DAILY


Trileptal (Oxcarbazepine) 300 Mg Tablet 600 Mg PO BID


Zyprexa (Olanzapine) 5 Mg Tablet 5 Mg PO PRN BID PRN


NICODERM CQ 14mg (Nicotine) 1 Each Patch.td24 1 Patch TD DAILY


Minoxidil 2.5 Mg Tablet 10 Mg PO DAILY


Cozaar (Losartan Potassium) 50 Mg Tablet 50 Mg PO DAILY


Lorazepam 1 Mg Tablet 1 Mg PO HS


Lorazepam 0.5 Mg Tablet 0.75 Mg PO [id number]


Lamictal (Lamotrigine) 150 Mg Tablet 150 Mg PO BID


Probiotic (Lactobacillus Acidophilus) 1 Each Capsule 1 Each PO TIDWMEALS


Duoneb 0.5-3(2.5) Mg/3 Ml (Albuterol/Ipratropium) 3 Ml Ampul.neb 3 Ml NEB PRN 

Q4HRS PRN


Fluticasone Propionate Nasal Spray (Fluticasone Propionate) 16 Gm Spray.susp 2 

[place] NS DAILY


Escitalopram Oxalate 10 Mg Tablet 10 Mg PO DAILY


Donepezil Hcl 5 Mg Tablet 5 Mg PO HS


Dicyclomine Hcl 10 Mg Capsule 10 Mg PO BID


I have reviewed the current psychotropics carefully including drug 

interactions.  Risk benefit ratio favors no change other than as noted in my 

dictated progress note.





Diagnosis:


Problems:  


(1) Anxiety disorder


(2) Impulse control disorder


(3) Psychotic depression


(4) Major depressive disorder, recurrent episode


(5) Dementia, vascular, with depression


(6) Dementia, vascular, with delusions


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











NEGAR DANIEL MD Apr 13, 2018 20:47

## 2018-04-13 NOTE — CONS
DATE OF CONSULTATION:  04/12/2018



REASON FOR CONSULTATION:  Medical management.



HISTORY OF PRESENT ILLNESS:  The patient is a 72-year-old resident at [facility], who was admitted on the account of being

combative, hitting and scratching verbally abusive towards staff, banged her

head on the wall and made suicidal attempt, has suicidal ideation plan, very

needy, disruptive and attention seeking, all this in a background of dementia

with depression and behavioral disturbances.



PAST MEDICAL HISTORY:  Significant for COPD, hypertension, hyperlipidemia,

neuropathy, seizure disorder, history of urinary tract infection, history of

pneumonia.



PAST PSYCHIATRIC HISTORY:  Significant for dementia with depression with

significant behavioral disturbances.



PAST SURGICAL HISTORY:  Unobtainable.



FAMILY HISTORY:  Unremarkable



SOCIAL HISTORY:  She is a resident at [facility].  She does not smoke, drink alcohol or use any recreational drugs.



REVIEW OF SYSTEMS:  Unobtainable.



PHYSICAL EXAMINATION:

GENERAL:  On examining her, she was sitting comfortably in her chair, eating her

supper, in no apparent distress, was pale, but no jaundice, cyanosis or

thyromegaly.  No jugular venous distension.  No lower limb edema.

VITAL SIGNS:  Her heart rate was 84, blood pressure 141/71, temperature was

97.6, respiratory rate was 16, and oxygen saturation was 95% on room air.

HEAD, EYES, EARS, NOSE AND THROAT:  Showed normocephalic, atraumatic.

NECK:  Supple.

HEART:  Showed normal first and second heart sounds with no gallop, rub or

murmur.

CHEST:  Clear to auscultation.  No crepitation or rhonchi.

ABDOMEN:  Distended, soft, nontender.

NEUROLOGIC:  She is demented, but without any obvious lateralizing sign.  All

cranial nerves are intact.

EXTREMITIES:  She moves extremities without difficulty.



LABORATORY DATA:  As of this morning showed white cell count was 4200,

hemoglobin 11.8, hematocrit 34, MCV 97 and platelet count 258,000.  Her serum

sodium was 138, potassium 4.3, chloride was 101, bicarbonate 26, anion gap of

11, BUN 20, creatinine 0.8.  Estimated GFR was 70 mL per minute.  Her glucose

was 92.  Calcium was 9.7.  Magnesium 2.  Total bilirubin, AST, ALT, alkaline

phosphatase were normal.  Total protein was 7.1, albumin was 3.8 and urinalysis

showed the urine was yellow, hazy with a pH of 7, specific gravity 1.017.  The

urine was negative for protein, glucose and ketones.  There was trace amount of

blood.  Urine was positive for nitrite, negative for bilirubin.  There was small

amount of leukocyte esterase, 3-5 rbc's, 5-10 wbc's.  There was moderate amount

of urine.



IMPRESSION:  In summary, this is a 72-year-old  female patient, who was

admitted on account of being combative, hitting and scratching, verbally abusive

towards the staff, banging her head on the wall and made suicidal attempts,

suicidal ideation and very needy, very disruptive, aggressive, and attention

seeking.  So far, all her vital signs and her lab works are stable.  I would

continue with current plan of management and decide further management

accordingly.



4/13/2018Thank you Dr. Booth for allowing me to participate in the care of this

patient.





______________________________

LUMA PLASCENCIA MD



DR:  LUPE/cherrie  JOB#:  [id number] / [id number]

DD:  04/12/2018 17:32  DT:  04/13/2018 01:42

## 2018-04-14 VITALS — DIASTOLIC BLOOD PRESSURE: 86 MMHG | SYSTOLIC BLOOD PRESSURE: 126 MMHG

## 2018-04-14 VITALS — SYSTOLIC BLOOD PRESSURE: 194 MMHG | DIASTOLIC BLOOD PRESSURE: 104 MMHG

## 2018-04-14 VITALS — DIASTOLIC BLOOD PRESSURE: 74 MMHG | SYSTOLIC BLOOD PRESSURE: 136 MMHG

## 2018-04-14 RX ADMIN — FLUTICASONE PROPIONATE SCH SPRAY: 50 SPRAY, METERED NASAL at 09:00

## 2018-04-14 RX ADMIN — AMOXICILLIN SCH MG: 250 CAPSULE ORAL at 14:24

## 2018-04-14 RX ADMIN — CITALOPRAM HYDROBROMIDE SCH MG: 20 TABLET ORAL at 08:39

## 2018-04-14 RX ADMIN — Medication SCH CAP: at 08:39

## 2018-04-14 RX ADMIN — ACETAMINOPHEN PRN MG: 325 TABLET, FILM COATED ORAL at 06:30

## 2018-04-14 RX ADMIN — LAMOTRIGINE SCH MG: 150 TABLET ORAL at 19:48

## 2018-04-14 RX ADMIN — AMOXICILLIN SCH MG: 250 CAPSULE ORAL at 08:39

## 2018-04-14 RX ADMIN — Medication SCH CAP: at 19:48

## 2018-04-14 RX ADMIN — FLUTICASONE PROPIONATE SCH SPRAY: 50 SPRAY, METERED NASAL at 08:39

## 2018-04-14 RX ADMIN — DONEPEZIL HYDROCHLORIDE SCH MG: 5 TABLET, FILM COATED ORAL at 19:48

## 2018-04-14 RX ADMIN — PANTOPRAZOLE SODIUM SCH MG: 40 TABLET, DELAYED RELEASE ORAL at 08:39

## 2018-04-14 RX ADMIN — CIPROFLOXACIN HYDROCHLORIDE SCH MG: 250 TABLET, FILM COATED ORAL at 19:48

## 2018-04-14 RX ADMIN — ACETAMINOPHEN PRN MG: 325 TABLET, FILM COATED ORAL at 19:48

## 2018-04-14 RX ADMIN — ACETAMINOPHEN PRN MG: 325 TABLET, FILM COATED ORAL at 12:51

## 2018-04-14 RX ADMIN — MINOXIDIL SCH MG: 2.5 TABLET ORAL at 06:27

## 2018-04-14 RX ADMIN — DICYCLOMINE HYDROCHLORIDE SCH MG: 10 CAPSULE ORAL at 19:48

## 2018-04-14 RX ADMIN — LOSARTAN POTASSIUM SCH MG: 50 TABLET, FILM COATED ORAL at 06:26

## 2018-04-14 RX ADMIN — CIPROFLOXACIN HYDROCHLORIDE SCH MG: 250 TABLET, FILM COATED ORAL at 08:38

## 2018-04-14 RX ADMIN — NICOTINE SCH PATCH: 14 PATCH, EXTENDED RELEASE TOPICAL at 08:40

## 2018-04-14 RX ADMIN — LAMOTRIGINE SCH MG: 150 TABLET ORAL at 08:39

## 2018-04-14 RX ADMIN — AMOXICILLIN SCH MG: 250 CAPSULE ORAL at 19:48

## 2018-04-14 RX ADMIN — DICYCLOMINE HYDROCHLORIDE SCH MG: 10 CAPSULE ORAL at 08:38

## 2018-04-14 RX ADMIN — NICOTINE SCH PATCH: 14 PATCH, EXTENDED RELEASE TOPICAL at 09:00

## 2018-04-14 NOTE — PDOC
Exam


Note:


Sanchez Note:


Please also refer to the separate dictated note~for this date of service 

dictated separately.~Patient seen individually. Discussed the patient with 

Nursing staff reviewed the chart.~Reviewed interim history and current 

functioning. Reviewed vital signs,~Labs/ Radiology~and current medications 

noted below. Continue current treatment with the changes noted in the dictated 

addendum note





Assessment:


Vital Signs:





 Vital Signs








  Date Time  Temp Pulse Resp B/P (MAP) Pulse Ox O2 Delivery O2 Flow Rate FiO2


 


4/14/18 16:26 97.5 79 18 126/86 (99) 97 Room Air  








I&O











Intake and Output 


 


 4/14/18





 07:00


 


Intake Total 600 ml


 


Output Total 900 ml


 


Balance -300 ml


 


 


 


Intake Oral 600 ml


 


Output Urine Total 900 ml


 


# Bowel Movements 1











Current Medications:


Meds:





Current Medications


Acetaminophen (Tylenol) 650 mg PRN Q6HRS  PRN PO PAIN / TEMP Last administered 

on 4/14/18at 12:51;  Start 4/11/18 at 19:30


Multi-Ingredient Ointment (Analgesic Balm) 1 logan PRN QID  PRN TP MUSCLE PAIN;  

Start 4/11/18 at 19:30


Al Hydroxide/Mg Hydroxide (Mylanta Plus Xs) 15 ml PRN AFTMEALHC  PRN PO 

DYSPEPSIA;  Start 4/11/18 at 19:30


Magnesium Hydroxide (Milk Of Magnesia) 2,400 mg PRN QHS  PRN PO CONSTIPATION;  

Start 4/11/18 at 19:30


Donepezil HCl (Aricept) 5 mg HS PO  Last administered on 4/13/18at 19:42;  

Start 4/11/18 at 23:00


Lorazepam (Ativan) 0.75 mg BID94 PO  Last administered on 4/14/18at 16:13;  

Start 4/12/18 at 09:00


Lorazepam (Ativan) 1 mg HS PO  Last administered on 4/12/18at 20:04;  Start 4/11 /18 at 23:00;  Stop 4/13/18 at 18:25;  Status DC


Olanzapine (ZyPREXA) 5 mg PRN BID  PRN PO ANXIETY / AGITATION Last administered 

on 4/14/18at 14:24;  Start 4/11/18 at 22:00


Dicyclomine HCl (Bentyl) 10 mg BID PO  Last administered on 4/14/18at 08:38;  

Start 4/11/18 at 23:00


Fluticasone Propionate (Flonase) 2 spray DAILY NS  Last administered on 4/12/ 18at 10:30;  Start 4/12/18 at 09:00


Albuterol/ Ipratropium (Duoneb) 3 ml PRN Q4HRS  PRN NEB SHORTNESS OF BREATH;  

Start 4/11/18 at 22:00


Lamotrigine (LaMICtal) 150 mg BID PO  Last administered on 4/14/18at 08:39;  

Start 4/11/18 at 23:00


Losartan Potassium (Cozaar) 50 mg DAILY PO  Last administered on 4/14/18at 06:26

;  Start 4/12/18 at 09:00


Minoxidil (Loniten) 10 mg DAILY PO  Last administered on 4/14/18at 06:27;  

Start 4/12/18 at 09:00


Nicotine (Nicoderm Cq 14mg) 1 patch DAILY TD  Last administered on 4/12/18at 10:

28;  Start 4/12/18 at 09:00


Oxcarbazepine (Trileptal) 600 mg BID PO  Last administered on 4/14/18at 08:39;  

Start 4/12/18 at 09:00


Pantoprazole Sodium (Protonix) 40 mg DAILY PO  Last administered on 4/14/18at 08

:39;  Start 4/12/18 at 09:00


Trimethoprim/ Sulfamethoxazole (Bactrim Ds) 1 tab BID PO  Last administered on 4 /13/18at 09:00;  Start 4/11/18 at 23:00;  Stop 4/13/18 at 16:19;  Status DC


Citalopram Hydrobromide (CeleXA) 20 mg DAILY PO  Last administered on 4/14/18at 

08:39;  Start 4/12/18 at 09:00;  Stop 4/14/18 at 18:32;  Status DC


Lactobacillus Rhamnosus (Culturelle) 1 cap BID PO  Last administered on 4/14/ 18at 08:39;  Start 4/12/18 at 09:00


Amoxicillin (Amoxil) 500 mg [id number] PO  Last administered on 4/14/18at 14:24;  

Start 4/13/18 at 21:00


Ciprofloxacin (Cipro) 250 mg BID PO  Last administered on 4/14/18at 08:38;  

Start 4/13/18 at 21:00


Lactobacillus Rhamnosus (Culturelle) 1 cap BID PO ;  Start 4/13/18 at 21:00;  

Stop 4/13/18 at 21:00;  Status DC


Lorazepam (Ativan) 0.75 mg HS PO ;  Start 4/14/18 at 21:00


Lorazepam (Ativan) 1 mg QHS PO  Last administered on 4/13/18at 19:42;  Start 4/ 13/18 at 21:00;  Stop 4/14/18 at 20:30


Fluvoxamine Maleate (Luvox) 25 mg QHS PO ;  Start 4/14/18 at 21:00





Active Scripts


Active


Reported


Bactrim Ds Tablet (Sulfamethoxazole/Trimethoprim) 1 Each Tablet 1 Each PO BID


Pantoprazole Sodium 40 Mg Tablet.dr 40 Mg PO DAILY


Trileptal (Oxcarbazepine) 300 Mg Tablet 600 Mg PO BID


Zyprexa (Olanzapine) 5 Mg Tablet 5 Mg PO PRN BID PRN


NICODERM CQ 14mg (Nicotine) 1 Each Patch.td24 1 Patch TD DAILY


Minoxidil 2.5 Mg Tablet 10 Mg PO DAILY


Cozaar (Losartan Potassium) 50 Mg Tablet 50 Mg PO DAILY


Lorazepam 1 Mg Tablet 1 Mg PO HS


Lorazepam 0.5 Mg Tablet 0.75 Mg PO BID94


Lamictal (Lamotrigine) 150 Mg Tablet 150 Mg PO BID


Probiotic (Lactobacillus Acidophilus) 1 Each Capsule 1 Each PO TIDWMEALS


Duoneb 0.5-3(2.5) Mg/3 Ml (Albuterol/Ipratropium) 3 Ml Ampul.neb 3 Ml NEB PRN 

Q4HRS PRN


Fluticasone Propionate Nasal Spray (Fluticasone Propionate) 16 Gm Spray.susp 2 

[place] NS DAILY


Escitalopram Oxalate 10 Mg Tablet 10 Mg PO DAILY


Donepezil Hcl 5 Mg Tablet 5 Mg PO HS


Dicyclomine Hcl 10 Mg Capsule 10 Mg PO BID


I have reviewed the current psychotropics carefully including drug 

interactions.  Risk benefit ratio favors no change other than as noted in my 

dictated progress note.





Diagnosis:


Problems:  


(1) Anxiety disorder


(2) Impulse control disorder


(3) Psychotic depression


(4) Major depressive disorder, recurrent episode


(5) Dementia, vascular, with depression


(6) Dementia, vascular, with delusions


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











NEGAR DANIEL MD Apr 14, 2018 19:44

## 2018-04-15 VITALS — SYSTOLIC BLOOD PRESSURE: 128 MMHG | DIASTOLIC BLOOD PRESSURE: 55 MMHG

## 2018-04-15 VITALS — SYSTOLIC BLOOD PRESSURE: 137 MMHG | DIASTOLIC BLOOD PRESSURE: 60 MMHG

## 2018-04-15 RX ADMIN — LAMOTRIGINE SCH MG: 150 TABLET ORAL at 08:59

## 2018-04-15 RX ADMIN — DICYCLOMINE HYDROCHLORIDE SCH MG: 10 CAPSULE ORAL at 08:59

## 2018-04-15 RX ADMIN — ACETAMINOPHEN PRN MG: 325 TABLET, FILM COATED ORAL at 14:11

## 2018-04-15 RX ADMIN — LOSARTAN POTASSIUM SCH MG: 50 TABLET, FILM COATED ORAL at 09:00

## 2018-04-15 RX ADMIN — NICOTINE SCH PATCH: 14 PATCH, EXTENDED RELEASE TOPICAL at 09:00

## 2018-04-15 RX ADMIN — NICOTINE SCH PATCH: 14 PATCH, EXTENDED RELEASE TOPICAL at 09:01

## 2018-04-15 RX ADMIN — FLUTICASONE PROPIONATE SCH SPRAY: 50 SPRAY, METERED NASAL at 09:02

## 2018-04-15 RX ADMIN — AMOXICILLIN SCH MG: 250 CAPSULE ORAL at 08:59

## 2018-04-15 RX ADMIN — MINOXIDIL SCH MG: 2.5 TABLET ORAL at 09:00

## 2018-04-15 RX ADMIN — AMOXICILLIN SCH MG: 250 CAPSULE ORAL at 14:11

## 2018-04-15 RX ADMIN — PANTOPRAZOLE SODIUM SCH MG: 40 TABLET, DELAYED RELEASE ORAL at 08:59

## 2018-04-15 RX ADMIN — DONEPEZIL HYDROCHLORIDE SCH MG: 5 TABLET, FILM COATED ORAL at 20:00

## 2018-04-15 RX ADMIN — AMOXICILLIN SCH MG: 250 CAPSULE ORAL at 19:59

## 2018-04-15 RX ADMIN — Medication SCH CAP: at 08:59

## 2018-04-15 RX ADMIN — Medication SCH CAP: at 19:59

## 2018-04-15 RX ADMIN — CIPROFLOXACIN HYDROCHLORIDE SCH MG: 250 TABLET, FILM COATED ORAL at 09:00

## 2018-04-15 RX ADMIN — DICYCLOMINE HYDROCHLORIDE SCH MG: 10 CAPSULE ORAL at 20:00

## 2018-04-15 RX ADMIN — LAMOTRIGINE SCH MG: 150 TABLET ORAL at 19:58

## 2018-04-15 RX ADMIN — CIPROFLOXACIN HYDROCHLORIDE SCH MG: 250 TABLET, FILM COATED ORAL at 20:03

## 2018-04-15 RX ADMIN — ACETAMINOPHEN PRN MG: 325 TABLET, FILM COATED ORAL at 23:19

## 2018-04-15 NOTE — PDOC
Exam


Note:


Sanchez Note:


Please also refer to the separate dictated note~for this date of service 

dictated separately.~Patient seen individually. Discussed the patient with 

Nursing staff reviewed the chart.~Reviewed interim history and current 

functioning. Reviewed vital signs,~Labs/ Radiology~and current medications 

noted below. Continue current treatment with the changes noted in the dictated 

addendum note





Assessment:


Vital Signs:





 Vital Signs








  Date Time  Temp Pulse Resp B/P (MAP) Pulse Ox O2 Delivery O2 Flow Rate FiO2


 


4/15/18 16:47 97.8 98 18 128/55 (79) 97   


 


4/14/18 16:26      Room Air  








I&O











Intake and Output 


 


 4/15/18





 07:00


 


Intake Total 1320 ml


 


Output Total 50 ml


 


Balance 1270 ml


 


 


 


Intake Oral 1320 ml


 


Output Urine Total 50 ml


 


# Bowel Movements 1











Current Medications:


Meds:





Current Medications


Acetaminophen (Tylenol) 650 mg PRN Q6HRS  PRN PO PAIN / TEMP Last administered 

on 4/15/18at 14:11;  Start 4/11/18 at 19:30


Multi-Ingredient Ointment (Analgesic Balm) 1 logan PRN QID  PRN TP MUSCLE PAIN;  

Start 4/11/18 at 19:30


Al Hydroxide/Mg Hydroxide (Mylanta Plus Xs) 15 ml PRN AFTMEALHC  PRN PO 

DYSPEPSIA;  Start 4/11/18 at 19:30


Magnesium Hydroxide (Milk Of Magnesia) 2,400 mg PRN QHS  PRN PO CONSTIPATION;  

Start 4/11/18 at 19:30


Donepezil HCl (Aricept) 5 mg HS PO  Last administered on 4/15/18at 20:00;  

Start 4/11/18 at 23:00


Lorazepam (Ativan) 0.75 mg BID94 PO  Last administered on 4/15/18at 16:30;  

Start 4/12/18 at 09:00


Lorazepam (Ativan) 1 mg HS PO  Last administered on 4/12/18at 20:04;  Start 4/11 /18 at 23:00;  Stop 4/13/18 at 18:25;  Status DC


Olanzapine (ZyPREXA) 5 mg PRN BID  PRN PO ANXIETY / AGITATION Last administered 

on 4/15/18at 13:04;  Start 4/11/18 at 22:00


Dicyclomine HCl (Bentyl) 10 mg BID PO  Last administered on 4/15/18at 20:00;  

Start 4/11/18 at 23:00


Fluticasone Propionate (Flonase) 2 spray DAILY NS  Last administered on 4/15/

18at 09:02;  Start 4/12/18 at 09:00


Albuterol/ Ipratropium (Duoneb) 3 ml PRN Q4HRS  PRN NEB SHORTNESS OF BREATH;  

Start 4/11/18 at 22:00


Lamotrigine (LaMICtal) 150 mg BID PO  Last administered on 4/15/18at 19:58;  

Start 4/11/18 at 23:00


Losartan Potassium (Cozaar) 50 mg DAILY PO  Last administered on 4/15/18at 09:00

;  Start 4/12/18 at 09:00


Minoxidil (Loniten) 10 mg DAILY PO  Last administered on 4/15/18at 09:00;  

Start 4/12/18 at 09:00


Nicotine (Nicoderm Cq 14mg) 1 patch DAILY TD  Last administered on 4/12/18at 10:

28;  Start 4/12/18 at 09:00


Oxcarbazepine (Trileptal) 600 mg BID PO  Last administered on 4/15/18at 20:00;  

Start 4/12/18 at 09:00


Pantoprazole Sodium (Protonix) 40 mg DAILY PO  Last administered on 4/15/18at 08

:59;  Start 4/12/18 at 09:00


Trimethoprim/ Sulfamethoxazole (Bactrim Ds) 1 tab BID PO  Last administered on 4 /13/18at 09:00;  Start 4/11/18 at 23:00;  Stop 4/13/18 at 16:19;  Status DC


Citalopram Hydrobromide (CeleXA) 20 mg DAILY PO  Last administered on 4/14/18at 

08:39;  Start 4/12/18 at 09:00;  Stop 4/14/18 at 18:32;  Status DC


Lactobacillus Rhamnosus (Culturelle) 1 cap BID PO  Last administered on 4/15/

18at 19:59;  Start 4/12/18 at 09:00


Amoxicillin (Amoxil) 500 mg [id number] PO  Last administered on 4/15/18at 19:59;  

Start 4/13/18 at 21:00


Ciprofloxacin (Cipro) 250 mg BID PO  Last administered on 4/15/18at 20:03;  

Start 4/13/18 at 21:00


Lactobacillus Rhamnosus (Culturelle) 1 cap BID PO ;  Start 4/13/18 at 21:00;  

Stop 4/13/18 at 21:00;  Status DC


Lorazepam (Ativan) 0.75 mg HS PO  Last administered on 4/15/18at 21:08;  Start 4 /14/18 at 21:00


Lorazepam (Ativan) 1 mg QHS PO  Last administered on 4/13/18at 19:42;  Start 4/ 13/18 at 21:00;  Stop 4/14/18 at 20:30;  Status DC


Fluvoxamine Maleate (Luvox) 25 mg QHS PO  Last administered on 4/15/18at 19:59;

  Start 4/14/18 at 21:00





Active Scripts


Active


Reported


Bactrim Ds Tablet (Sulfamethoxazole/Trimethoprim) 1 Each Tablet 1 Each PO BID


Pantoprazole Sodium 40 Mg Tablet.dr 40 Mg PO DAILY


Trileptal (Oxcarbazepine) 300 Mg Tablet 600 Mg PO BID


Zyprexa (Olanzapine) 5 Mg Tablet 5 Mg PO PRN BID PRN


NICODERM CQ 14mg (Nicotine) 1 Each Patch.td24 1 Patch TD DAILY


Minoxidil 2.5 Mg Tablet 10 Mg PO DAILY


Cozaar (Losartan Potassium) 50 Mg Tablet 50 Mg PO DAILY


Lorazepam 1 Mg Tablet 1 Mg PO HS


Lorazepam 0.5 Mg Tablet 0.75 Mg PO BID94


Lamictal (Lamotrigine) 150 Mg Tablet 150 Mg PO BID


Probiotic (Lactobacillus Acidophilus) 1 Each Capsule 1 Each PO TIDWMEALS


Duoneb 0.5-3(2.5) Mg/3 Ml (Albuterol/Ipratropium) 3 Ml Ampul.neb 3 Ml NEB PRN 

Q4HRS PRN


Fluticasone Propionate Nasal Spray (Fluticasone Propionate) 16 Gm Spray.susp 2 

[place] NS DAILY


Escitalopram Oxalate 10 Mg Tablet 10 Mg PO DAILY


Donepezil Hcl 5 Mg Tablet 5 Mg PO HS


Dicyclomine Hcl 10 Mg Capsule 10 Mg PO BID


I have reviewed the current psychotropics carefully including drug 

interactions.  Risk benefit ratio favors no change other than as noted in my 

dictated progress note.





Diagnosis:


Problems:  


(1) Anxiety disorder


(2) Impulse control disorder


(3) Psychotic depression


(4) Major depressive disorder, recurrent episode


(5) Dementia, vascular, with depression


(6) Dementia, vascular, with delusions


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











NEGAR DANIEL MD Apr 15, 2018 22:09

## 2018-04-15 NOTE — PN
DATE:  04/13/2018



This late entry 04/13/2018 covers elements not covered in my initial note

04/13/2018.  I met with the patient evening of 04/13/2018.  The patient refused

some of her medications previous evening, dropped herself out of the bed the

previous evening, grabbing people, abusive, aggressive, disruptive.



REVIEW OF SYSTEMS:  Ambulation impaired, in beach chair.  No CV, , pulmonary,

eye system symptoms on review.



MENTAL STATUS EXAM:  Oriented to herself and at times to situation.  Speech

moderate latency, often responses monosyllabic.  Abstraction fair, computation

impaired, language function intact, attention span short.  Mood and affect

remain somewhat withdrawn.



LABORATORY DATA:  Reviewed.



IMPRESSION:  Major depressive disorder; major neurocognitive disorder, early

Alzheimer, vascular with depression, urinary tract infection.



PLAN:  The patient is on Cipro and Amoxil for UTI.  She is on Ativan 0.75 mg

b.i.d. and 1 mg at bedtime.  We will drop the bedtime dosage down to 0.75 mg in

an attempt to taper the Ativan.  Maintain Lexapro, Aricept along with Zyprexa

p.r.n.





______________________________

MAN IRINEO DANIEL MD



DR:  REMY/cherrie  JOB#:  [id number] / [id number]

DD:  04/15/2018 09:34  DT:  04/15/2018 23:39

## 2018-04-16 VITALS — DIASTOLIC BLOOD PRESSURE: 73 MMHG | SYSTOLIC BLOOD PRESSURE: 145 MMHG

## 2018-04-16 VITALS — DIASTOLIC BLOOD PRESSURE: 81 MMHG | SYSTOLIC BLOOD PRESSURE: 140 MMHG

## 2018-04-16 LAB
ALBUMIN SERPL-MCNC: 3.2 G/DL (ref 3.4–5)
ALBUMIN/GLOB SERPL: 1.1 {RATIO} (ref 1–1.7)
ALP SERPL-CCNC: 75 U/L (ref 46–116)
ALT SERPL-CCNC: 17 U/L (ref 14–59)
ANION GAP SERPL CALC-SCNC: 7 MMOL/L (ref 6–14)
AST SERPL-CCNC: 12 U/L (ref 15–37)
BASOPHILS # BLD AUTO: 0 X10^3/UL (ref 0–0.2)
BASOPHILS NFR BLD: 1 % (ref 0–3)
BILIRUB SERPL-MCNC: 0.2 MG/DL (ref 0.2–1)
BUN/CREAT SERPL: 33 (ref 6–20)
CA-I SERPL ISE-MCNC: 23 MG/DL (ref 7–20)
CALCIUM SERPL-MCNC: 8.9 MG/DL (ref 8.5–10.1)
CHLORIDE SERPL-SCNC: 103 MMOL/L (ref 98–107)
CO2 SERPL-SCNC: 27 MMOL/L (ref 21–32)
CREAT SERPL-MCNC: 0.7 MG/DL (ref 0.6–1)
EOSINOPHIL NFR BLD: 0.1 X10^3/UL (ref 0–0.7)
EOSINOPHIL NFR BLD: 3 % (ref 0–3)
ERYTHROCYTE [DISTWIDTH] IN BLOOD BY AUTOMATED COUNT: 17.5 % (ref 11.5–14.5)
GFR SERPLBLD BASED ON 1.73 SQ M-ARVRAT: 82.5 ML/MIN
GLOBULIN SER-MCNC: 2.9 G/DL (ref 2.2–3.8)
GLUCOSE SERPL-MCNC: 87 MG/DL (ref 70–99)
HCT VFR BLD CALC: 32.7 % (ref 36–47)
HGB BLD-MCNC: 11.2 G/DL (ref 12–15.5)
LYMPHOCYTES # BLD: 1.2 X10^3/UL (ref 1–4.8)
LYMPHOCYTES NFR BLD AUTO: 32 % (ref 24–48)
MCH RBC QN AUTO: 33 PG (ref 25–35)
MCHC RBC AUTO-ENTMCNC: 34 G/DL (ref 31–37)
MCV RBC AUTO: 96 FL (ref 79–100)
MONO #: 0.6 X10^3/UL (ref 0–1.1)
MONOCYTES NFR BLD: 16 % (ref 0–9)
NEUT #: 1.9 X10^3UL (ref 1.8–7.7)
NEUTROPHILS NFR BLD AUTO: 48 % (ref 31–73)
PLATELET # BLD AUTO: 251 X10^3/UL (ref 140–400)
POTASSIUM SERPL-SCNC: 4.6 MMOL/L (ref 3.5–5.1)
PROT SERPL-MCNC: 6.1 G/DL (ref 6.4–8.2)
RBC # BLD AUTO: 3.4 X10^6/UL (ref 3.5–5.4)
SODIUM SERPL-SCNC: 137 MMOL/L (ref 136–145)
WBC # BLD AUTO: 3.9 X10^3/UL (ref 4–11)

## 2018-04-16 RX ADMIN — ACETAMINOPHEN PRN MG: 325 TABLET, FILM COATED ORAL at 12:36

## 2018-04-16 RX ADMIN — DICYCLOMINE HYDROCHLORIDE SCH MG: 10 CAPSULE ORAL at 20:00

## 2018-04-16 RX ADMIN — AMOXICILLIN SCH MG: 250 CAPSULE ORAL at 09:22

## 2018-04-16 RX ADMIN — LAMOTRIGINE SCH MG: 150 TABLET ORAL at 09:21

## 2018-04-16 RX ADMIN — LOSARTAN POTASSIUM SCH MG: 50 TABLET, FILM COATED ORAL at 09:21

## 2018-04-16 RX ADMIN — CIPROFLOXACIN HYDROCHLORIDE SCH MG: 250 TABLET, FILM COATED ORAL at 20:00

## 2018-04-16 RX ADMIN — AMOXICILLIN SCH MG: 250 CAPSULE ORAL at 19:57

## 2018-04-16 RX ADMIN — DICYCLOMINE HYDROCHLORIDE SCH MG: 10 CAPSULE ORAL at 09:22

## 2018-04-16 RX ADMIN — PANTOPRAZOLE SODIUM SCH MG: 40 TABLET, DELAYED RELEASE ORAL at 09:21

## 2018-04-16 RX ADMIN — MINOXIDIL SCH MG: 2.5 TABLET ORAL at 09:21

## 2018-04-16 RX ADMIN — LOSARTAN POTASSIUM SCH MG: 50 TABLET, FILM COATED ORAL at 09:00

## 2018-04-16 RX ADMIN — NICOTINE SCH PATCH: 14 PATCH, EXTENDED RELEASE TOPICAL at 09:00

## 2018-04-16 RX ADMIN — AMOXICILLIN SCH MG: 250 CAPSULE ORAL at 12:36

## 2018-04-16 RX ADMIN — FLUTICASONE PROPIONATE SCH SPRAY: 50 SPRAY, METERED NASAL at 09:22

## 2018-04-16 RX ADMIN — DONEPEZIL HYDROCHLORIDE SCH MG: 5 TABLET, FILM COATED ORAL at 19:57

## 2018-04-16 RX ADMIN — CIPROFLOXACIN HYDROCHLORIDE SCH MG: 250 TABLET, FILM COATED ORAL at 09:21

## 2018-04-16 RX ADMIN — Medication SCH CAP: at 20:00

## 2018-04-16 RX ADMIN — LAMOTRIGINE SCH MG: 150 TABLET ORAL at 20:00

## 2018-04-16 RX ADMIN — MINOXIDIL SCH MG: 2.5 TABLET ORAL at 09:00

## 2018-04-16 RX ADMIN — Medication SCH CAP: at 09:21

## 2018-04-16 NOTE — PDOC
Exam


Note:


Sanchez Note:


Please also refer to the separate dictated note~for this date of service 

dictated separately.~Patient seen individually. Discussed the patient with 

Nursing staff reviewed the chart.~Reviewed interim history and current 

functioning. Reviewed vital signs,~Labs/ Radiology~and current medications 

noted below. Continue current treatment with the changes noted in the dictated 

addendum note





Assessment:


Vital Signs:





 Vital Signs








  Date Time  Temp Pulse Resp B/P (MAP) Pulse Ox O2 Delivery O2 Flow Rate FiO2


 


4/16/18 15:49 98.1 94 20 140/81 (100) 98   


 


4/14/18 16:26      Room Air  








I&O











Intake and Output 


 


 4/16/18





 07:00


 


Intake Total 960 ml


 


Output Total 1000 ml


 


Balance -40 ml


 


 


 


Intake Oral 960 ml


 


Output Urine Total 1000 ml








Labs:





 Laboratory Tests








Test


  4/16/18


06:39


 


White Blood Count


  3.9 x10^3/uL


(4.0-11.0)  L


 


Red Blood Count


  3.40 x10^6/uL


(3.50-5.40)  L


 


Hemoglobin


  11.2 g/dL


(12.0-15.5)  L


 


Hematocrit


  32.7 %


(36.0-47.0)  L


 


Mean Corpuscular Volume


  96 fL ()


 


 


Mean Corpuscular Hemoglobin 33 pg (25-35)  


 


Mean Corpuscular Hemoglobin


Concent 34 g/dL


(31-37)


 


Red Cell Distribution Width


  17.5 %


(11.5-14.5)  H


 


Platelet Count


  251 x10^3/uL


(140-400)


 


Neutrophils (%) (Auto) 48 % (31-73)  


 


Lymphocytes (%) (Auto) 32 % (24-48)  


 


Monocytes (%) (Auto) 16 % (0-9)  H


 


Eosinophils (%) (Auto) 3 % (0-3)  


 


Basophils (%) (Auto) 1 % (0-3)  


 


Neutrophils # (Auto)


  1.9 x10^3uL


(1.8-7.7)


 


Lymphocytes # (Auto)


  1.2 x10^3/uL


(1.0-4.8)


 


Monocytes # (Auto)


  0.6 x10^3/uL


(0.0-1.1)


 


Eosinophils # (Auto)


  0.1 x10^3/uL


(0.0-0.7)


 


Basophils # (Auto)


  0.0 x10^3/uL


(0.0-0.2)


 


Sodium Level


  137 mmol/L


(136-145)


 


Potassium Level


  4.6 mmol/L


(3.5-5.1)


 


Chloride Level


  103 mmol/L


()


 


Carbon Dioxide Level


  27 mmol/L


(21-32)


 


Anion Gap 7 (6-14)  


 


Blood Urea Nitrogen


  23 mg/dL


(7-20)  H


 


Creatinine


  0.7 mg/dL


(0.6-1.0)


 


Estimated GFR


(Cockcroft-Gault) 82.5  


 


 


BUN/Creatinine Ratio 33 (6-20)  H


 


Glucose Level


  87 mg/dL


(70-99)


 


Calcium Level


  8.9 mg/dL


(8.5-10.1)


 


Total Bilirubin


  0.2 mg/dL


(0.2-1.0)


 


Aspartate Amino Transferase


(AST) 12 U/L (15-37)


L


 


Alanine Aminotransferase (ALT)


  17 U/L (14-59)


 


 


Alkaline Phosphatase


  75 U/L


()


 


Total Protein


  6.1 g/dL


(6.4-8.2)  L


 


Albumin


  3.2 g/dL


(3.4-5.0)  L


 


Albumin/Globulin Ratio 1.1 (1.0-1.7)  











Current Medications:


Meds:





Current Medications


Acetaminophen (Tylenol) 650 mg PRN Q6HRS  PRN PO PAIN / TEMP Last administered 

on 4/16/18at 12:36;  Start 4/11/18 at 19:30


Multi-Ingredient Ointment (Analgesic Balm) 1 logan PRN QID  PRN TP MUSCLE PAIN;  

Start 4/11/18 at 19:30


Al Hydroxide/Mg Hydroxide (Mylanta Plus Xs) 15 ml PRN AFTMEALHC  PRN PO 

DYSPEPSIA;  Start 4/11/18 at 19:30


Magnesium Hydroxide (Milk Of Magnesia) 2,400 mg PRN QHS  PRN PO CONSTIPATION;  

Start 4/11/18 at 19:30


Donepezil HCl (Aricept) 5 mg HS PO  Last administered on 4/16/18at 19:57;  

Start 4/11/18 at 23:00


Lorazepam (Ativan) 0.75 mg BID94 PO  Last administered on 4/16/18at 16:42;  

Start 4/12/18 at 09:00


Lorazepam (Ativan) 1 mg HS PO  Last administered on 4/12/18at 20:04;  Start 4/11 /18 at 23:00;  Stop 4/13/18 at 18:25;  Status DC


Olanzapine (ZyPREXA) 5 mg PRN BID  PRN PO ANXIETY / AGITATION Last administered 

on 4/16/18at 13:32;  Start 4/11/18 at 22:00;  Stop 4/16/18 at 18:40;  Status DC


Dicyclomine HCl (Bentyl) 10 mg BID PO  Last administered on 4/16/18at 20:00;  

Start 4/11/18 at 23:00


Fluticasone Propionate (Flonase) 2 spray DAILY NS  Last administered on 4/16/ 18at 09:22;  Start 4/12/18 at 09:00


Albuterol/ Ipratropium (Duoneb) 3 ml PRN Q4HRS  PRN NEB SHORTNESS OF BREATH;  

Start 4/11/18 at 22:00


Lamotrigine (LaMICtal) 150 mg BID PO  Last administered on 4/16/18at 20:00;  

Start 4/11/18 at 23:00


Losartan Potassium (Cozaar) 50 mg DAILY PO  Last administered on 4/15/18at 09:00

;  Start 4/12/18 at 09:00


Minoxidil (Loniten) 10 mg DAILY PO  Last administered on 4/15/18at 09:00;  

Start 4/12/18 at 09:00


Nicotine (Nicoderm Cq 14mg) 1 patch DAILY TD  Last administered on 4/12/18at 10:

28;  Start 4/12/18 at 09:00


Oxcarbazepine (Trileptal) 600 mg BID PO  Last administered on 4/16/18at 20:01;  

Start 4/12/18 at 09:00


Pantoprazole Sodium (Protonix) 40 mg DAILY PO  Last administered on 4/16/18at 09

:21;  Start 4/12/18 at 09:00


Trimethoprim/ Sulfamethoxazole (Bactrim Ds) 1 tab BID PO  Last administered on 4 /13/18at 09:00;  Start 4/11/18 at 23:00;  Stop 4/13/18 at 16:19;  Status DC


Citalopram Hydrobromide (CeleXA) 20 mg DAILY PO  Last administered on 4/14/18at 

08:39;  Start 4/12/18 at 09:00;  Stop 4/14/18 at 18:32;  Status DC


Lactobacillus Rhamnosus (Culturelle) 1 cap BID PO  Last administered on 4/16/ 18at 20:00;  Start 4/12/18 at 09:00


Amoxicillin (Amoxil) 500 mg [id number] PO  Last administered on 4/16/18at 19:57;  

Start 4/13/18 at 21:00


Ciprofloxacin (Cipro) 250 mg BID PO  Last administered on 4/16/18at 20:00;  

Start 4/13/18 at 21:00


Lactobacillus Rhamnosus (Culturelle) 1 cap BID PO ;  Start 4/13/18 at 21:00;  

Stop 4/13/18 at 21:00;  Status DC


Lorazepam (Ativan) 0.75 mg HS PO  Last administered on 4/16/18at 19:59;  Start 4 /14/18 at 21:00


Lorazepam (Ativan) 1 mg QHS PO  Last administered on 4/13/18at 19:42;  Start 4/ 13/18 at 21:00;  Stop 4/14/18 at 20:30;  Status DC


Fluvoxamine Maleate (Luvox) 25 mg QHS PO  Last administered on 4/16/18at 20:01;

  Start 4/14/18 at 21:00;  Stop 4/16/18 at 23:00


Fluvoxamine Maleate (Luvox) 50 mg HS PO ;  Start 4/17/18 at 21:00


Olanzapine (ZyPREXA ZYDIS) 2.5 mg PRN Q2HR  PRN PO PSYCHOSIS;  Start 4/16/18 at 

18:45





Active Scripts


Active


Reported


Bactrim Ds Tablet (Sulfamethoxazole/Trimethoprim) 1 Each Tablet 1 Each PO BID


Pantoprazole Sodium 40 Mg Tablet.dr 40 Mg PO DAILY


Trileptal (Oxcarbazepine) 300 Mg Tablet 600 Mg PO BID


Zyprexa (Olanzapine) 5 Mg Tablet 5 Mg PO PRN BID PRN


NICODERM CQ 14mg (Nicotine) 1 Each Patch.td24 1 Patch TD DAILY


Minoxidil 2.5 Mg Tablet 10 Mg PO DAILY


Cozaar (Losartan Potassium) 50 Mg Tablet 50 Mg PO DAILY


Lorazepam 1 Mg Tablet 1 Mg PO HS


Lorazepam 0.5 Mg Tablet 0.75 Mg PO BID94


Lamictal (Lamotrigine) 150 Mg Tablet 150 Mg PO BID


Probiotic (Lactobacillus Acidophilus) 1 Each Capsule 1 Each PO TIDWMEALS


Duoneb 0.5-3(2.5) Mg/3 Ml (Albuterol/Ipratropium) 3 Ml Ampul.neb 3 Ml NEB PRN 

Q4HRS PRN


Fluticasone Propionate Nasal Spray (Fluticasone Propionate) 16 Gm Spray.susp 2 

[place] NS DAILY


Escitalopram Oxalate 10 Mg Tablet 10 Mg PO DAILY


Donepezil Hcl 5 Mg Tablet 5 Mg PO HS


Dicyclomine Hcl 10 Mg Capsule 10 Mg PO BID


I have reviewed the current psychotropics carefully including drug 

interactions.  Risk benefit ratio favors no change other than as noted in my 

dictated progress note.





Diagnosis:


Problems:  


(1) Anxiety disorder


(2) Impulse control disorder


(3) Psychotic depression


(4) Major depressive disorder, recurrent episode


(5) Dementia, vascular, with depression


(6) Dementia, vascular, with delusions


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











NEGAR DANIEL MD Apr 16, 2018 21:07

## 2018-04-17 VITALS — DIASTOLIC BLOOD PRESSURE: 85 MMHG | SYSTOLIC BLOOD PRESSURE: 148 MMHG

## 2018-04-17 VITALS — DIASTOLIC BLOOD PRESSURE: 83 MMHG | SYSTOLIC BLOOD PRESSURE: 182 MMHG

## 2018-04-17 RX ADMIN — AMOXICILLIN SCH MG: 250 CAPSULE ORAL at 14:30

## 2018-04-17 RX ADMIN — Medication SCH CAP: at 09:09

## 2018-04-17 RX ADMIN — DICYCLOMINE HYDROCHLORIDE SCH MG: 10 CAPSULE ORAL at 09:09

## 2018-04-17 RX ADMIN — CIPROFLOXACIN HYDROCHLORIDE SCH MG: 250 TABLET, FILM COATED ORAL at 20:18

## 2018-04-17 RX ADMIN — AMOXICILLIN SCH MG: 250 CAPSULE ORAL at 09:09

## 2018-04-17 RX ADMIN — PANTOPRAZOLE SODIUM SCH MG: 40 TABLET, DELAYED RELEASE ORAL at 09:10

## 2018-04-17 RX ADMIN — AMOXICILLIN SCH MG: 250 CAPSULE ORAL at 20:18

## 2018-04-17 RX ADMIN — DICYCLOMINE HYDROCHLORIDE SCH MG: 10 CAPSULE ORAL at 20:19

## 2018-04-17 RX ADMIN — LAMOTRIGINE SCH MG: 150 TABLET ORAL at 09:09

## 2018-04-17 RX ADMIN — ACETAMINOPHEN PRN MG: 325 TABLET, FILM COATED ORAL at 17:11

## 2018-04-17 RX ADMIN — CIPROFLOXACIN HYDROCHLORIDE SCH MG: 250 TABLET, FILM COATED ORAL at 09:09

## 2018-04-17 RX ADMIN — Medication SCH CAP: at 20:19

## 2018-04-17 RX ADMIN — NICOTINE SCH PATCH: 14 PATCH, EXTENDED RELEASE TOPICAL at 09:00

## 2018-04-17 RX ADMIN — MINOXIDIL SCH MG: 2.5 TABLET ORAL at 09:12

## 2018-04-17 RX ADMIN — DONEPEZIL HYDROCHLORIDE SCH MG: 5 TABLET, FILM COATED ORAL at 20:19

## 2018-04-17 RX ADMIN — LAMOTRIGINE SCH MG: 150 TABLET ORAL at 20:19

## 2018-04-17 RX ADMIN — LOSARTAN POTASSIUM SCH MG: 50 TABLET, FILM COATED ORAL at 09:09

## 2018-04-17 RX ADMIN — FLUTICASONE PROPIONATE SCH SPRAY: 50 SPRAY, METERED NASAL at 09:23

## 2018-04-17 NOTE — PN
DATE:  04/16/2018



This late entry for 04/16/2018 covers elements not covered in my initial note of

04/16/2018.



SUBJECTIVE:  I met with the patient evening of 04/16/2018.  Per nursing report,

there is no change from the previous days, resistive to a.m. medications,

complaining of headaches at lunchtime, anxious, labile, at times agitated,

received Zyprexa in the evening, did not have to be syringed.



REVIEW OF SYSTEMS:  Ambulation impaired, in wheelchair.  No CV, , pulmonary,

eye system symptoms on review.  Reliability varies.



MENTAL STATUS EXAM:  Oriented to herself.  Insight, judgment, recent memory is

impaired.  Language function intact.  Attention span short.  Mood and affect

somewhat dysphoric, anxious, at times labile.



LABORATORY DATA:  Reviewed.



IMPRESSION:  Major depressive disorder; anxiety disorder, unspecified; obsessive

compulsive disorder, cognitive disorder, unspecified.



PLAN:  Increase Luvox to 50 mg at bedtime.  We will continue to taper the

Ativan.





______________________________

NEGAR DANIEL MD



DR:  REMY/cherrie  JOB#:  [id number] / [id number]

DD:  04/17/2018 13:51  DT:  04/17/2018 22:45

## 2018-04-17 NOTE — PDOC
Exam


Note:


Sanchez Note:


Please also refer to the separate dictated note~for this date of service 

dictated separately.~Patient seen individually. Discussed the patient with 

Nursing staff reviewed the chart.~Reviewed interim history and current 

functioning. Reviewed vital signs,~Labs/ Radiology~and current medications 

noted below. Continue current treatment with the changes noted in the dictated 

addendum note





Assessment:


Vital Signs:





 Vital Signs








  Date Time  Temp Pulse Resp B/P (MAP) Pulse Ox O2 Delivery O2 Flow Rate FiO2


 


4/17/18 15:42 97.7 110 20 148/85 (106) 96   


 


4/14/18 16:26      Room Air  








I&O











Intake and Output 


 


 4/17/18





 07:00


 


Intake Total 480 ml


 


Output Total 600 ml


 


Balance -120 ml


 


 


 


Intake Oral 480 ml


 


Output Urine Total 600 ml


 


# Bowel Movements 1











Current Medications:


Meds:





Current Medications


Acetaminophen (Tylenol) 650 mg PRN Q6HRS  PRN PO PAIN / TEMP Last administered 

on 4/17/18at 17:11;  Start 4/11/18 at 19:30


Multi-Ingredient Ointment (Analgesic Balm) 1 logan PRN QID  PRN TP MUSCLE PAIN;  

Start 4/11/18 at 19:30


Al Hydroxide/Mg Hydroxide (Mylanta Plus Xs) 15 ml PRN AFTMEALHC  PRN PO 

DYSPEPSIA;  Start 4/11/18 at 19:30


Magnesium Hydroxide (Milk Of Magnesia) 2,400 mg PRN QHS  PRN PO CONSTIPATION;  

Start 4/11/18 at 19:30


Donepezil HCl (Aricept) 5 mg HS PO  Last administered on 4/17/18at 20:19;  

Start 4/11/18 at 23:00


Lorazepam (Ativan) 0.75 mg BID94 PO  Last administered on 4/17/18at 16:07;  

Start 4/12/18 at 09:00;  Stop 4/17/18 at 18:34;  Status DC


Lorazepam (Ativan) 1 mg HS PO  Last administered on 4/12/18at 20:04;  Start 4/11 /18 at 23:00;  Stop 4/13/18 at 18:25;  Status DC


Olanzapine (ZyPREXA) 5 mg PRN BID  PRN PO ANXIETY / AGITATION Last administered 

on 4/16/18at 13:32;  Start 4/11/18 at 22:00;  Stop 4/16/18 at 18:40;  Status DC


Dicyclomine HCl (Bentyl) 10 mg BID PO  Last administered on 4/17/18at 20:19;  

Start 4/11/18 at 23:00


Fluticasone Propionate (Flonase) 2 spray DAILY NS  Last administered on 4/17/ 18at 09:23;  Start 4/12/18 at 09:00


Albuterol/ Ipratropium (Duoneb) 3 ml PRN Q4HRS  PRN NEB SHORTNESS OF BREATH;  

Start 4/11/18 at 22:00


Lamotrigine (LaMICtal) 150 mg BID PO  Last administered on 4/17/18at 20:19;  

Start 4/11/18 at 23:00


Losartan Potassium (Cozaar) 50 mg DAILY PO  Last administered on 4/17/18at 09:09

;  Start 4/12/18 at 09:00


Minoxidil (Loniten) 10 mg DAILY PO  Last administered on 4/17/18at 09:12;  

Start 4/12/18 at 09:00


Nicotine (Nicoderm Cq 14mg) 1 patch DAILY TD  Last administered on 4/12/18at 10:

28;  Start 4/12/18 at 09:00


Oxcarbazepine (Trileptal) 600 mg BID PO  Last administered on 4/17/18at 20:19;  

Start 4/12/18 at 09:00


Pantoprazole Sodium (Protonix) 40 mg DAILY PO  Last administered on 4/17/18at 09

:10;  Start 4/12/18 at 09:00


Trimethoprim/ Sulfamethoxazole (Bactrim Ds) 1 tab BID PO  Last administered on 4 /13/18at 09:00;  Start 4/11/18 at 23:00;  Stop 4/13/18 at 16:19;  Status DC


Citalopram Hydrobromide (CeleXA) 20 mg DAILY PO  Last administered on 4/14/18at 

08:39;  Start 4/12/18 at 09:00;  Stop 4/14/18 at 18:32;  Status DC


Lactobacillus Rhamnosus (Culturelle) 1 cap BID PO  Last administered on 4/17/ 18at 20:19;  Start 4/12/18 at 09:00


Amoxicillin (Amoxil) 500 mg [id number] PO  Last administered on 4/17/18at 20:18;  

Start 4/13/18 at 21:00;  Stop 4/19/18 at 09:00


Ciprofloxacin (Cipro) 250 mg BID PO  Last administered on 4/17/18at 20:18;  

Start 4/13/18 at 21:00;  Stop 4/19/18 at 09:00


Lactobacillus Rhamnosus (Culturelle) 1 cap BID PO ;  Start 4/13/18 at 21:00;  

Stop 4/13/18 at 21:00;  Status DC


Lorazepam (Ativan) 0.75 mg HS PO  Last administered on 4/17/18at 20:21;  Start 4 /14/18 at 21:00


Lorazepam (Ativan) 1 mg QHS PO  Last administered on 4/13/18at 19:42;  Start 4/ 13/18 at 21:00;  Stop 4/14/18 at 20:30;  Status DC


Fluvoxamine Maleate (Luvox) 25 mg QHS PO  Last administered on 4/16/18at 20:01;

  Start 4/14/18 at 21:00;  Stop 4/16/18 at 23:00;  Status DC


Fluvoxamine Maleate (Luvox) 50 mg HS PO  Last administered on 4/17/18at 20:21;  

Start 4/17/18 at 21:00


Olanzapine (ZyPREXA ZYDIS) 2.5 mg PRN Q2HR  PRN PO PSYCHOSIS;  Start 4/16/18 at 

18:45


Lorazepam (Ativan) 0.5 mg DAILY PO ;  Start 4/18/18 at 09:00


Lorazepam (Ativan) 0.75 mg 1600 PO ;  Start 4/18/18 at 16:00





Active Scripts


Active


Reported


Bactrim Ds Tablet (Sulfamethoxazole/Trimethoprim) 1 Each Tablet 1 Each PO BID


Pantoprazole Sodium 40 Mg Tablet.dr 40 Mg PO DAILY


Trileptal (Oxcarbazepine) 300 Mg Tablet 600 Mg PO BID


Zyprexa (Olanzapine) 5 Mg Tablet 5 Mg PO PRN BID PRN


NICODERM CQ 14mg (Nicotine) 1 Each Patch.td24 1 Patch TD DAILY


Minoxidil 2.5 Mg Tablet 10 Mg PO DAILY


Cozaar (Losartan Potassium) 50 Mg Tablet 50 Mg PO DAILY


Lorazepam 1 Mg Tablet 1 Mg PO HS


Lorazepam 0.5 Mg Tablet 0.75 Mg PO BID94


Lamictal (Lamotrigine) 150 Mg Tablet 150 Mg PO BID


Probiotic (Lactobacillus Acidophilus) 1 Each Capsule 1 Each PO TIDWMEALS


Duoneb 0.5-3(2.5) Mg/3 Ml (Albuterol/Ipratropium) 3 Ml Ampul.neb 3 Ml NEB PRN 

Q4HRS PRN


Fluticasone Propionate Nasal Spray (Fluticasone Propionate) 16 Gm Spray.susp 2 

[place] NS DAILY


Escitalopram Oxalate 10 Mg Tablet 10 Mg PO DAILY


Donepezil Hcl 5 Mg Tablet 5 Mg PO HS


Dicyclomine Hcl 10 Mg Capsule 10 Mg PO BID


I have reviewed the current psychotropics carefully including drug 

interactions.  Risk benefit ratio favors no change other than as noted in my 

dictated progress note.





Diagnosis:


Problems:  


(1) Anxiety disorder


(2) Impulse control disorder


(3) Psychotic depression


(4) Major depressive disorder, recurrent episode


(5) Dementia, vascular, with depression


(6) Dementia, vascular, with delusions


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











NEGAR DANIEL MD Apr 17, 2018 20:55

## 2018-04-17 NOTE — PN
DATE:  04/15/2018



This late entry 04/15/2018 covers elements not covered in my initial note

04/15/2018.



SUBJECTIVE:  I met with the patient in the evening of 04/15/2018.  Overall, per

nursing report, the patient was quite combative after lunch, banging her head on

the floor in the quiet room, anxious, obsessive, labile.



REVIEW OF SYSTEMS:  Ambulation impaired, in wheelchair.  No CV, , pulmonary,

eye, ENT system symptoms on review, not very verbal.



MENTAL STATUS EXAM:  Oriented to herself and situation.  Speech moderate

latency, often responses monosyllabic.  Abstraction fair, computation impaired,

language function intact.  Mood and affect somewhat labile.



LABORATORY DATA:  Reviewed.



IMPRESSION:  Major depressive disorder with psychotic features, major

neurocognitive disorder, Alzheimer, vascular with delusion, depression.



PLAN:  Continue psychotropics mentioned in my initial note, may need to increase

Luvox.  Consider adding Seroquel as a mood stabilizer.





______________________________

NEGAR DANIEL MD



DR:  REMY/cherrie  JOB#:  [id number] / [id number]

DD:  04/16/2018 15:59  DT:  04/17/2018 04:14

## 2018-04-18 VITALS — SYSTOLIC BLOOD PRESSURE: 146 MMHG | DIASTOLIC BLOOD PRESSURE: 75 MMHG

## 2018-04-18 VITALS — DIASTOLIC BLOOD PRESSURE: 72 MMHG | SYSTOLIC BLOOD PRESSURE: 138 MMHG

## 2018-04-18 RX ADMIN — NICOTINE SCH PATCH: 14 PATCH, EXTENDED RELEASE TOPICAL at 09:00

## 2018-04-18 RX ADMIN — MINOXIDIL SCH MG: 2.5 TABLET ORAL at 12:30

## 2018-04-18 RX ADMIN — AMOXICILLIN SCH MG: 250 CAPSULE ORAL at 19:37

## 2018-04-18 RX ADMIN — MINOXIDIL SCH MG: 2.5 TABLET ORAL at 09:16

## 2018-04-18 RX ADMIN — LOSARTAN POTASSIUM SCH MG: 50 TABLET, FILM COATED ORAL at 12:30

## 2018-04-18 RX ADMIN — FLUTICASONE PROPIONATE SCH SPRAY: 50 SPRAY, METERED NASAL at 09:15

## 2018-04-18 RX ADMIN — DONEPEZIL HYDROCHLORIDE SCH MG: 5 TABLET, FILM COATED ORAL at 19:36

## 2018-04-18 RX ADMIN — PANTOPRAZOLE SODIUM SCH MG: 40 TABLET, DELAYED RELEASE ORAL at 09:15

## 2018-04-18 RX ADMIN — LOSARTAN POTASSIUM SCH MG: 50 TABLET, FILM COATED ORAL at 09:14

## 2018-04-18 RX ADMIN — CIPROFLOXACIN HYDROCHLORIDE SCH MG: 250 TABLET, FILM COATED ORAL at 19:37

## 2018-04-18 RX ADMIN — PANTOPRAZOLE SODIUM SCH MG: 40 TABLET, DELAYED RELEASE ORAL at 12:30

## 2018-04-18 RX ADMIN — Medication SCH CAP: at 19:37

## 2018-04-18 RX ADMIN — FLUTICASONE PROPIONATE SCH SPRAY: 50 SPRAY, METERED NASAL at 09:16

## 2018-04-18 RX ADMIN — Medication SCH CAP: at 09:15

## 2018-04-18 RX ADMIN — CIPROFLOXACIN HYDROCHLORIDE SCH MG: 250 TABLET, FILM COATED ORAL at 09:15

## 2018-04-18 RX ADMIN — ACETAMINOPHEN PRN MG: 325 TABLET, FILM COATED ORAL at 17:44

## 2018-04-18 RX ADMIN — AMOXICILLIN SCH MG: 250 CAPSULE ORAL at 09:14

## 2018-04-18 RX ADMIN — ACETAMINOPHEN PRN MG: 325 TABLET, FILM COATED ORAL at 05:59

## 2018-04-18 RX ADMIN — LAMOTRIGINE SCH MG: 150 TABLET ORAL at 09:16

## 2018-04-18 RX ADMIN — LAMOTRIGINE SCH MG: 150 TABLET ORAL at 19:36

## 2018-04-18 RX ADMIN — AMOXICILLIN SCH MG: 250 CAPSULE ORAL at 15:17

## 2018-04-18 RX ADMIN — DICYCLOMINE HYDROCHLORIDE SCH MG: 10 CAPSULE ORAL at 19:37

## 2018-04-18 RX ADMIN — DICYCLOMINE HYDROCHLORIDE SCH MG: 10 CAPSULE ORAL at 09:14

## 2018-04-18 NOTE — PN
DATE:  04/17/2018



This late entry 04/17/2017, covers elements not covered in my initial note

04/17/2018.  Met with the patient evening of 04/17/2018.



SUBJECTIVE:  The patient slept 6-1/2 hours previous evening, was quite resistive

to taking her medications previous evening, better on 04/17/2018, less

disorganized, asked for her antibiotics for the UTI, somewhat obsessively plays

with her catheter.



REVIEW OF SYSTEMS:  Ambulation impaired.  No CV, , pulmonary, eye, ENT system

symptoms on review.



MENTAL STATUS EXAM:  Oriented to herself and situation.  Speech moderate

latency, often responses monosyllabic.  Abstraction fair, computation impaired,

language function intact, attention span short.  Mood and affect somewhat

withdrawn.



LABORATORY DATA:  Reviewed.



IMPRESSION:  Major depressive disorder with psychotic features; major

neurocognitive disorder, Alzheimer, vascular with delusion, depression; anxiety

disorder, unspecified.



PLAN:  She is on Ativan 0.75 mg 3 times a day.  We will reduce it by 0.25 mg a

day and gradually continue to reduce it.  Maintain Luvox 50 mg at bedtime,

Aricept along with Zyprexa p.r.n.





______________________________

MAN IRINEO DANIEL MD



DR:  REMY/cherrie  JOB#:  [id number] / [id number]

DD:  04/18/2018 12:22  DT:  04/18/2018 18:33

## 2018-04-18 NOTE — PDOC
Exam


Note:


Sanchez Note:


Please also refer to the separate dictated note~for this date of service 

dictated separately.~Patient seen individually. Discussed the patient with 

Nursing staff reviewed the chart.~Reviewed interim history and current 

functioning. Reviewed vital signs,~Labs/ Radiology~and current medications 

noted below. Continue current treatment with the changes noted in the dictated 

addendum note





Assessment:


Vital Signs:





 Vital Signs








  Date Time  Temp Pulse Resp B/P (MAP) Pulse Ox O2 Delivery O2 Flow Rate FiO2


 


4/18/18 16:24 98.4 90 16 146/75 (98) 95   


 


4/14/18 16:26      Room Air  








I&O











Intake and Output 


 


 4/18/18





 07:00


 


Intake Total 1080 ml


 


Output Total 250 ml


 


Balance 830 ml


 


 


 


Intake Oral 1080 ml


 


Output Urine Total 250 ml


 


# Bowel Movements 1











Current Medications:


Meds:





Current Medications


Acetaminophen (Tylenol) 650 mg PRN Q6HRS  PRN PO PAIN / TEMP Last administered 

on 4/18/18at 17:44;  Start 4/11/18 at 19:30


Multi-Ingredient Ointment (Analgesic Balm) 1 logan PRN QID  PRN TP MUSCLE PAIN;  

Start 4/11/18 at 19:30


Al Hydroxide/Mg Hydroxide (Mylanta Plus Xs) 15 ml PRN AFTMEALHC  PRN PO 

DYSPEPSIA;  Start 4/11/18 at 19:30


Magnesium Hydroxide (Milk Of Magnesia) 2,400 mg PRN QHS  PRN PO CONSTIPATION;  

Start 4/11/18 at 19:30


Donepezil HCl (Aricept) 5 mg HS PO  Last administered on 4/18/18at 19:36;  

Start 4/11/18 at 23:00


Lorazepam (Ativan) 0.75 mg BID94 PO  Last administered on 4/17/18at 16:07;  

Start 4/12/18 at 09:00;  Stop 4/17/18 at 18:34;  Status DC


Lorazepam (Ativan) 1 mg HS PO  Last administered on 4/12/18at 20:04;  Start 4/11 /18 at 23:00;  Stop 4/13/18 at 18:25;  Status DC


Olanzapine (ZyPREXA) 5 mg PRN BID  PRN PO ANXIETY / AGITATION Last administered 

on 4/16/18at 13:32;  Start 4/11/18 at 22:00;  Stop 4/16/18 at 18:40;  Status DC


Dicyclomine HCl (Bentyl) 10 mg BID PO  Last administered on 4/18/18at 19:37;  

Start 4/11/18 at 23:00


Fluticasone Propionate (Flonase) 2 spray DAILY NS  Last administered on 4/17/ 18at 09:23;  Start 4/12/18 at 09:00


Albuterol/ Ipratropium (Duoneb) 3 ml PRN Q4HRS  PRN NEB SHORTNESS OF BREATH;  

Start 4/11/18 at 22:00


Lamotrigine (LaMICtal) 150 mg BID PO  Last administered on 4/18/18at 19:36;  

Start 4/11/18 at 23:00


Losartan Potassium (Cozaar) 50 mg DAILY PO  Last administered on 4/17/18at 09:09

;  Start 4/12/18 at 09:00


Minoxidil (Loniten) 10 mg DAILY PO  Last administered on 4/17/18at 09:12;  

Start 4/12/18 at 09:00


Nicotine (Nicoderm Cq 14mg) 1 patch DAILY TD  Last administered on 4/12/18at 10:

28;  Start 4/12/18 at 09:00


Oxcarbazepine (Trileptal) 600 mg BID PO  Last administered on 4/18/18at 19:37;  

Start 4/12/18 at 09:00


Pantoprazole Sodium (Protonix) 40 mg DAILY PO  Last administered on 4/17/18at 09

:10;  Start 4/12/18 at 09:00


Trimethoprim/ Sulfamethoxazole (Bactrim Ds) 1 tab BID PO  Last administered on 4 /13/18at 09:00;  Start 4/11/18 at 23:00;  Stop 4/13/18 at 16:19;  Status DC


Citalopram Hydrobromide (CeleXA) 20 mg DAILY PO  Last administered on 4/14/18at 

08:39;  Start 4/12/18 at 09:00;  Stop 4/14/18 at 18:32;  Status DC


Lactobacillus Rhamnosus (Culturelle) 1 cap BID PO  Last administered on 4/18/ 18at 19:37;  Start 4/12/18 at 09:00


Amoxicillin (Amoxil) 500 mg [id number] PO  Last administered on 4/18/18at 19:37;  

Start 4/13/18 at 21:00;  Stop 4/19/18 at 09:00


Ciprofloxacin (Cipro) 250 mg BID PO  Last administered on 4/18/18at 19:37;  

Start 4/13/18 at 21:00;  Stop 4/19/18 at 09:00


Lactobacillus Rhamnosus (Culturelle) 1 cap BID PO ;  Start 4/13/18 at 21:00;  

Stop 4/13/18 at 21:00;  Status DC


Lorazepam (Ativan) 0.75 mg HS PO  Last administered on 4/18/18at 19:41;  Start 4 /14/18 at 21:00


Lorazepam (Ativan) 1 mg QHS PO  Last administered on 4/13/18at 19:42;  Start 4/ 13/18 at 21:00;  Stop 4/14/18 at 20:30;  Status DC


Fluvoxamine Maleate (Luvox) 25 mg QHS PO  Last administered on 4/16/18at 20:01;

  Start 4/14/18 at 21:00;  Stop 4/16/18 at 23:00;  Status DC


Fluvoxamine Maleate (Luvox) 50 mg HS PO  Last administered on 4/18/18at 19:36;  

Start 4/17/18 at 21:00


Olanzapine (ZyPREXA ZYDIS) 2.5 mg PRN Q2HR  PRN PO PSYCHOSIS Last administered 

on 4/18/18at 11:31;  Start 4/16/18 at 18:45


Lorazepam (Ativan) 0.5 mg DAILY PO ;  Start 4/18/18 at 09:00


Lorazepam (Ativan) 0.75 mg 1600 PO  Last administered on 4/18/18at 17:44;  

Start 4/18/18 at 16:00


Quetiapine Fumarate (SEROquel) 12.5 mg 0900,1300,1700 PO ;  Start 4/19/18 at 09:

00





Active Scripts


Active


Reported


Bactrim Ds Tablet (Sulfamethoxazole/Trimethoprim) 1 Each Tablet 1 Each PO BID


Pantoprazole Sodium 40 Mg Tablet.dr 40 Mg PO DAILY


Trileptal (Oxcarbazepine) 300 Mg Tablet 600 Mg PO BID


Zyprexa (Olanzapine) 5 Mg Tablet 5 Mg PO PRN BID PRN


NICODERM CQ 14mg (Nicotine) 1 Each Patch.td24 1 Patch TD DAILY


Minoxidil 2.5 Mg Tablet 10 Mg PO DAILY


Cozaar (Losartan Potassium) 50 Mg Tablet 50 Mg PO DAILY


Lorazepam 1 Mg Tablet 1 Mg PO HS


Lorazepam 0.5 Mg Tablet 0.75 Mg PO BID94


Lamictal (Lamotrigine) 150 Mg Tablet 150 Mg PO BID


Probiotic (Lactobacillus Acidophilus) 1 Each Capsule 1 Each PO TIDWMEALS


Duoneb 0.5-3(2.5) Mg/3 Ml (Albuterol/Ipratropium) 3 Ml Ampul.neb 3 Ml NEB PRN 

Q4HRS PRN


Fluticasone Propionate Nasal Spray (Fluticasone Propionate) 16 Gm Spray.susp 2 

[place] NS DAILY


Escitalopram Oxalate 10 Mg Tablet 10 Mg PO DAILY


Donepezil Hcl 5 Mg Tablet 5 Mg PO HS


Dicyclomine Hcl 10 Mg Capsule 10 Mg PO BID


I have reviewed the current psychotropics carefully including drug 

interactions.  Risk benefit ratio favors no change other than as noted in my 

dictated progress note.





Diagnosis:


Problems:  


(1) Anxiety disorder


(2) Impulse control disorder


(3) Psychotic depression


(4) Major depressive disorder, recurrent episode


(5) Dementia, vascular, with depression


(6) Dementia, vascular, with delusions


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











NEGAR DANIEL MD Apr 18, 2018 21:15

## 2018-04-19 VITALS — SYSTOLIC BLOOD PRESSURE: 138 MMHG | DIASTOLIC BLOOD PRESSURE: 75 MMHG

## 2018-04-19 VITALS — SYSTOLIC BLOOD PRESSURE: 167 MMHG | DIASTOLIC BLOOD PRESSURE: 68 MMHG

## 2018-04-19 RX ADMIN — LOSARTAN POTASSIUM SCH MG: 50 TABLET, FILM COATED ORAL at 08:35

## 2018-04-19 RX ADMIN — FLUTICASONE PROPIONATE SCH SPRAY: 50 SPRAY, METERED NASAL at 09:00

## 2018-04-19 RX ADMIN — AMOXICILLIN SCH MG: 250 CAPSULE ORAL at 08:35

## 2018-04-19 RX ADMIN — QUETIAPINE FUMARATE SCH MG: 25 TABLET, FILM COATED ORAL at 16:22

## 2018-04-19 RX ADMIN — LAMOTRIGINE SCH MG: 150 TABLET ORAL at 08:36

## 2018-04-19 RX ADMIN — Medication SCH CAP: at 08:36

## 2018-04-19 RX ADMIN — DICYCLOMINE HYDROCHLORIDE SCH MG: 10 CAPSULE ORAL at 20:01

## 2018-04-19 RX ADMIN — LAMOTRIGINE SCH MG: 150 TABLET ORAL at 20:27

## 2018-04-19 RX ADMIN — PANTOPRAZOLE SODIUM SCH MG: 40 TABLET, DELAYED RELEASE ORAL at 08:35

## 2018-04-19 RX ADMIN — MINOXIDIL SCH MG: 2.5 TABLET ORAL at 08:35

## 2018-04-19 RX ADMIN — QUETIAPINE FUMARATE SCH MG: 25 TABLET, FILM COATED ORAL at 08:37

## 2018-04-19 RX ADMIN — DICYCLOMINE HYDROCHLORIDE SCH MG: 10 CAPSULE ORAL at 08:35

## 2018-04-19 RX ADMIN — NICOTINE SCH PATCH: 14 PATCH, EXTENDED RELEASE TOPICAL at 09:00

## 2018-04-19 RX ADMIN — ACETAMINOPHEN PRN MG: 325 TABLET, FILM COATED ORAL at 10:29

## 2018-04-19 RX ADMIN — DONEPEZIL HYDROCHLORIDE SCH MG: 5 TABLET, FILM COATED ORAL at 20:02

## 2018-04-19 RX ADMIN — QUETIAPINE FUMARATE SCH MG: 25 TABLET, FILM COATED ORAL at 12:55

## 2018-04-19 RX ADMIN — CIPROFLOXACIN HYDROCHLORIDE SCH MG: 250 TABLET, FILM COATED ORAL at 08:35

## 2018-04-19 RX ADMIN — Medication SCH CAP: at 20:01

## 2018-04-19 RX ADMIN — ACETAMINOPHEN PRN MG: 325 TABLET, FILM COATED ORAL at 16:23

## 2018-04-19 NOTE — PDOC
Exam


Note:


Sanchez Note:


Please also refer to the separate dictated note~for this date of service 

dictated separately.~Patient seen individually. Discussed the patient with 

Nursing staff reviewed the chart.~Reviewed interim history and current 

functioning. Reviewed vital signs,~Labs/ Radiology~and current medications 

noted below. Continue current treatment with the changes noted in the dictated 

addendum note





Assessment:


Vital Signs:





 Vital Signs








  Date Time  Temp Pulse Resp B/P (MAP) Pulse Ox O2 Delivery O2 Flow Rate FiO2


 


4/19/18 16:16 98.7 107 24 138/75 (96)  Room Air  


 


4/19/18 05:48     95   








I&O











Intake and Output 


 


 4/19/18





 07:00


 


Intake Total 720 ml


 


Output Total 825 ml


 


Balance -105 ml


 


 


 


Intake Oral 720 ml


 


Output Urine Total 825 ml


 


# Bowel Movements 1











Current Medications:


Meds:





Current Medications


Acetaminophen (Tylenol) 650 mg PRN Q6HRS  PRN PO PAIN / TEMP Last administered 

on 4/19/18at 16:23;  Start 4/11/18 at 19:30


Multi-Ingredient Ointment (Analgesic Balm) 1 logan PRN QID  PRN TP MUSCLE PAIN;  

Start 4/11/18 at 19:30


Al Hydroxide/Mg Hydroxide (Mylanta Plus Xs) 15 ml PRN AFTMEALHC  PRN PO 

DYSPEPSIA;  Start 4/11/18 at 19:30


Magnesium Hydroxide (Milk Of Magnesia) 2,400 mg PRN QHS  PRN PO CONSTIPATION;  

Start 4/11/18 at 19:30


Donepezil HCl (Aricept) 5 mg HS PO  Last administered on 4/19/18at 20:02;  

Start 4/11/18 at 23:00


Lorazepam (Ativan) 0.75 mg BID94 PO  Last administered on 4/17/18at 16:07;  

Start 4/12/18 at 09:00;  Stop 4/17/18 at 18:34;  Status DC


Lorazepam (Ativan) 1 mg HS PO  Last administered on 4/12/18at 20:04;  Start 4/11 /18 at 23:00;  Stop 4/13/18 at 18:25;  Status DC


Olanzapine (ZyPREXA) 5 mg PRN BID  PRN PO ANXIETY / AGITATION Last administered 

on 4/16/18at 13:32;  Start 4/11/18 at 22:00;  Stop 4/16/18 at 18:40;  Status DC


Dicyclomine HCl (Bentyl) 10 mg BID PO  Last administered on 4/19/18at 20:01;  

Start 4/11/18 at 23:00


Fluticasone Propionate (Flonase) 2 spray DAILY NS  Last administered on 4/17/ 18at 09:23;  Start 4/12/18 at 09:00


Albuterol/ Ipratropium (Duoneb) 3 ml PRN Q4HRS  PRN NEB SHORTNESS OF BREATH;  

Start 4/11/18 at 22:00


Lamotrigine (LaMICtal) 150 mg BID PO  Last administered on 4/19/18at 20:27;  

Start 4/11/18 at 23:00


Losartan Potassium (Cozaar) 50 mg DAILY PO  Last administered on 4/19/18at 08:35

;  Start 4/12/18 at 09:00


Minoxidil (Loniten) 10 mg DAILY PO  Last administered on 4/19/18at 08:35;  

Start 4/12/18 at 09:00


Nicotine (Nicoderm Cq 14mg) 1 patch DAILY TD  Last administered on 4/12/18at 10:

28;  Start 4/12/18 at 09:00


Oxcarbazepine (Trileptal) 600 mg BID PO  Last administered on 4/19/18at 20:01;  

Start 4/12/18 at 09:00


Pantoprazole Sodium (Protonix) 40 mg DAILY PO  Last administered on 4/19/18at 08

:35;  Start 4/12/18 at 09:00


Trimethoprim/ Sulfamethoxazole (Bactrim Ds) 1 tab BID PO  Last administered on 4 /13/18at 09:00;  Start 4/11/18 at 23:00;  Stop 4/13/18 at 16:19;  Status DC


Citalopram Hydrobromide (CeleXA) 20 mg DAILY PO  Last administered on 4/14/18at 

08:39;  Start 4/12/18 at 09:00;  Stop 4/14/18 at 18:32;  Status DC


Lactobacillus Rhamnosus (Culturelle) 1 cap BID PO  Last administered on 4/19/ 18at 20:01;  Start 4/12/18 at 09:00


Amoxicillin (Amoxil) 500 mg [id number] PO  Last administered on 4/19/18at 08:35;  

Start 4/13/18 at 21:00;  Stop 4/19/18 at 09:00;  Status DC


Ciprofloxacin (Cipro) 250 mg BID PO  Last administered on 4/19/18at 08:35;  

Start 4/13/18 at 21:00;  Stop 4/19/18 at 09:00;  Status DC


Lactobacillus Rhamnosus (Culturelle) 1 cap BID PO ;  Start 4/13/18 at 21:00;  

Stop 4/13/18 at 21:00;  Status DC


Lorazepam (Ativan) 0.75 mg HS PO  Last administered on 4/19/18at 20:07;  Start 4 /14/18 at 21:00


Lorazepam (Ativan) 1 mg QHS PO  Last administered on 4/13/18at 19:42;  Start 4/ 13/18 at 21:00;  Stop 4/14/18 at 20:30;  Status DC


Fluvoxamine Maleate (Luvox) 25 mg QHS PO  Last administered on 4/16/18at 20:01;

  Start 4/14/18 at 21:00;  Stop 4/16/18 at 23:00;  Status DC


Fluvoxamine Maleate (Luvox) 50 mg HS PO  Last administered on 4/19/18at 20:01;  

Start 4/17/18 at 21:00


Olanzapine (ZyPREXA ZYDIS) 2.5 mg PRN Q2HR  PRN PO PSYCHOSIS Last administered 

on 4/19/18at 16:23;  Start 4/16/18 at 18:45


Lorazepam (Ativan) 0.5 mg DAILY PO  Last administered on 4/19/18at 08:36;  

Start 4/18/18 at 09:00


Lorazepam (Ativan) 0.75 mg 1600 PO  Last administered on 4/19/18at 16:23;  

Start 4/18/18 at 16:00


Quetiapine Fumarate (SEROquel) 12.5 mg 0900,1300,1700 PO  Last administered on 4 /19/18at 16:22;  Start 4/19/18 at 09:00;  Stop 4/19/18 at 18:23;  Status DC


Quetiapine Fumarate (SEROquel) 25 mg 0900,1300,1700 PO ;  Start 4/20/18 at 09:00





Active Scripts


Active


Reported


Bactrim Ds Tablet (Sulfamethoxazole/Trimethoprim) 1 Each Tablet 1 Each PO BID


Pantoprazole Sodium 40 Mg Tablet.dr 40 Mg PO DAILY


Trileptal (Oxcarbazepine) 300 Mg Tablet 600 Mg PO BID


Zyprexa (Olanzapine) 5 Mg Tablet 5 Mg PO PRN BID PRN


NICODERM CQ 14mg (Nicotine) 1 Each Patch.td24 1 Patch TD DAILY


Minoxidil 2.5 Mg Tablet 10 Mg PO DAILY


Cozaar (Losartan Potassium) 50 Mg Tablet 50 Mg PO DAILY


Lorazepam 1 Mg Tablet 1 Mg PO HS


Lorazepam 0.5 Mg Tablet 0.75 Mg PO BID94


Lamictal (Lamotrigine) 150 Mg Tablet 150 Mg PO BID


Probiotic (Lactobacillus Acidophilus) 1 Each Capsule 1 Each PO TIDWMEALS


Duoneb 0.5-3(2.5) Mg/3 Ml (Albuterol/Ipratropium) 3 Ml Ampul.neb 3 Ml NEB PRN 

Q4HRS PRN


Fluticasone Propionate Nasal Spray (Fluticasone Propionate) 16 Gm Spray.susp 2 

[place] NS DAILY


Escitalopram Oxalate 10 Mg Tablet 10 Mg PO DAILY


Donepezil Hcl 5 Mg Tablet 5 Mg PO HS


Dicyclomine Hcl 10 Mg Capsule 10 Mg PO BID


I have reviewed the current psychotropics carefully including drug 

interactions.  Risk benefit ratio favors no change other than as noted in my 

dictated progress note.





Diagnosis:


Problems:  


(1) Anxiety disorder


(2) Impulse control disorder


(3) Psychotic depression


(4) Major depressive disorder, recurrent episode


(5) Dementia, vascular, with depression


(6) Dementia, vascular, with delusions


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











NEGAR DANIEL MD Apr 19, 2018 21:00

## 2018-04-20 VITALS — DIASTOLIC BLOOD PRESSURE: 66 MMHG | SYSTOLIC BLOOD PRESSURE: 155 MMHG

## 2018-04-20 VITALS — DIASTOLIC BLOOD PRESSURE: 75 MMHG | SYSTOLIC BLOOD PRESSURE: 167 MMHG

## 2018-04-20 RX ADMIN — LAMOTRIGINE SCH MG: 150 TABLET ORAL at 19:40

## 2018-04-20 RX ADMIN — QUETIAPINE FUMARATE SCH MG: 25 TABLET, FILM COATED ORAL at 12:00

## 2018-04-20 RX ADMIN — NICOTINE SCH PATCH: 14 PATCH, EXTENDED RELEASE TOPICAL at 08:56

## 2018-04-20 RX ADMIN — Medication SCH CAP: at 19:40

## 2018-04-20 RX ADMIN — DONEPEZIL HYDROCHLORIDE SCH MG: 5 TABLET, FILM COATED ORAL at 19:40

## 2018-04-20 RX ADMIN — FLUTICASONE PROPIONATE SCH SPRAY: 50 SPRAY, METERED NASAL at 08:58

## 2018-04-20 RX ADMIN — QUETIAPINE FUMARATE SCH MG: 25 TABLET, FILM COATED ORAL at 08:58

## 2018-04-20 RX ADMIN — ACETAMINOPHEN PRN MG: 325 TABLET, FILM COATED ORAL at 15:44

## 2018-04-20 RX ADMIN — LAMOTRIGINE SCH MG: 150 TABLET ORAL at 08:56

## 2018-04-20 RX ADMIN — DICYCLOMINE HYDROCHLORIDE SCH MG: 10 CAPSULE ORAL at 19:40

## 2018-04-20 RX ADMIN — MINOXIDIL SCH MG: 2.5 TABLET ORAL at 08:59

## 2018-04-20 RX ADMIN — Medication SCH CAP: at 08:56

## 2018-04-20 RX ADMIN — PANTOPRAZOLE SODIUM SCH MG: 40 TABLET, DELAYED RELEASE ORAL at 08:56

## 2018-04-20 RX ADMIN — DICYCLOMINE HYDROCHLORIDE SCH MG: 10 CAPSULE ORAL at 08:56

## 2018-04-20 RX ADMIN — ACETAMINOPHEN PRN MG: 325 TABLET, FILM COATED ORAL at 06:12

## 2018-04-20 RX ADMIN — QUETIAPINE FUMARATE SCH MG: 25 TABLET, FILM COATED ORAL at 16:52

## 2018-04-20 RX ADMIN — CHOLECALCIFEROL CAP 1.25 MG (50000 UNIT) SCH UNIT: 1.25 CAP at 09:15

## 2018-04-20 RX ADMIN — ACETAMINOPHEN PRN MG: 325 TABLET, FILM COATED ORAL at 21:00

## 2018-04-20 RX ADMIN — LOSARTAN POTASSIUM SCH MG: 50 TABLET, FILM COATED ORAL at 08:56

## 2018-04-20 NOTE — PN
DATE:  04/19/2018



PSYCHIATRIC PROGRESS NOTE





This late entry 04/19/2018 covers elements not covered in my initial note of

04/19/2018.





SUBJECTIVE:  Met with the patient in the evening and staffed with the entire

treatment team in the morning along with her daughter, Kate, who is her DPOA

attending from.  Family is looking at her transitioning to live with one of the

other daughters.  We will also consult Dr. Macdonald for her seizure disorder,

especially since we are gradually reducing her Ativan.  She was resistive to

a.m. medications.





REVIEW OF SYSTEMS:  Ambulation is impaired, in wheelchair.  No CV, ,

pulmonary, eye, ENT system symptoms on review.  She is quite resistive sarcastic

in the morning, compliant with medications, later irritable, combative, arguing

with staff, received Zyprexa for aggression after lunch, ripping off dressing

from the catheter and throwing it at people, quite erratic.  At 5:00 p.m., she

was combative, put herself on the floor.  We may need to place her on one-on-one

status.





MENTAL STATUS EXAM:  Oriented to herself, situation.  Speech is coherent, rapid

at times.  Abstraction is fair, computation impaired, language function intact. 

Mood and affect remains labile.





LABORATORY DATA:  Reviewed.





IMPRESSION:  Major depressive disorder with psychotic features; major

neurocognitive disorder, Alzheimer, vascular with delusion, depression, bipolar

1 disorder, unspecified.





PLAN:  Increase Seroquel from 12.5 mg 3 times a day to 25 mg 3 times a day. 

Continue Rest unchanged.  May need to increase Trileptal and she has been

started on low dose Luvox.  We will have Neurology consult, Dr. Macdonald for

seizure disorder.





______________________________

MAN IRINEO DANIEL MD



DR:  REMY/cherrie  JOB#:  [id number] / [id number]

DD:  04/20/2018 13:07  DT:  04/20/2018 21:11

## 2018-04-20 NOTE — PDOC
Exam


Note:


Sanchez Note:


Please also refer to the separate dictated note~for this date of service 

dictated separately.~Patient seen individually. Discussed the patient with 

Nursing staff reviewed the chart.~Reviewed interim history and current 

functioning. Reviewed vital signs,~Labs/ Radiology~and current medications 

noted below. Continue current treatment with the changes noted in the dictated 

addendum note





Assessment:


Vital Signs:





 Vital Signs








  Date Time  Temp Pulse Resp B/P (MAP) Pulse Ox O2 Delivery O2 Flow Rate FiO2


 


4/20/18 16:11 98.5 95 20 155/66 (95) 95 Room Air  








I&O











Intake and Output 


 


 4/20/18





 07:00


 


Intake Total 600 ml


 


Balance 600 ml


 


 


 


Intake Oral 600 ml


 


# Bowel Movements 1











Current Medications:


Meds:





Current Medications


Acetaminophen (Tylenol) 650 mg PRN Q6HRS  PRN PO PAIN / TEMP Last administered 

on 4/20/18at 15:44;  Start 4/11/18 at 19:30


Multi-Ingredient Ointment (Analgesic Balm) 1 logan PRN QID  PRN TP MUSCLE PAIN;  

Start 4/11/18 at 19:30


Al Hydroxide/Mg Hydroxide (Mylanta Plus Xs) 15 ml PRN AFTMEALHC  PRN PO 

DYSPEPSIA;  Start 4/11/18 at 19:30


Magnesium Hydroxide (Milk Of Magnesia) 2,400 mg PRN QHS  PRN PO CONSTIPATION;  

Start 4/11/18 at 19:30


Donepezil HCl (Aricept) 5 mg HS PO  Last administered on 4/20/18at 19:40;  

Start 4/11/18 at 23:00


Lorazepam (Ativan) 0.75 mg BID94 PO  Last administered on 4/17/18at 16:07;  

Start 4/12/18 at 09:00;  Stop 4/17/18 at 18:34;  Status DC


Lorazepam (Ativan) 1 mg HS PO  Last administered on 4/12/18at 20:04;  Start 4/11 /18 at 23:00;  Stop 4/13/18 at 18:25;  Status DC


Olanzapine (ZyPREXA) 5 mg PRN BID  PRN PO ANXIETY / AGITATION Last administered 

on 4/16/18at 13:32;  Start 4/11/18 at 22:00;  Stop 4/16/18 at 18:40;  Status DC


Dicyclomine HCl (Bentyl) 10 mg BID PO  Last administered on 4/20/18at 19:40;  

Start 4/11/18 at 23:00


Fluticasone Propionate (Flonase) 2 spray DAILY NS  Last administered on 4/20/ 18at 08:58;  Start 4/12/18 at 09:00


Albuterol/ Ipratropium (Duoneb) 3 ml PRN Q4HRS  PRN NEB SHORTNESS OF BREATH;  

Start 4/11/18 at 22:00


Lamotrigine (LaMICtal) 150 mg BID PO  Last administered on 4/20/18at 19:40;  

Start 4/11/18 at 23:00


Losartan Potassium (Cozaar) 50 mg DAILY PO  Last administered on 4/20/18at 08:56

;  Start 4/12/18 at 09:00


Minoxidil (Loniten) 10 mg DAILY PO  Last administered on 4/20/18at 08:59;  

Start 4/12/18 at 09:00


Nicotine (Nicoderm Cq 14mg) 1 patch DAILY TD  Last administered on 4/20/18at 08:

56;  Start 4/12/18 at 09:00


Oxcarbazepine (Trileptal) 600 mg BID PO  Last administered on 4/20/18at 19:40;  

Start 4/12/18 at 09:00


Pantoprazole Sodium (Protonix) 40 mg DAILY PO  Last administered on 4/20/18at 08

:56;  Start 4/12/18 at 09:00


Trimethoprim/ Sulfamethoxazole (Bactrim Ds) 1 tab BID PO  Last administered on 4 /13/18at 09:00;  Start 4/11/18 at 23:00;  Stop 4/13/18 at 16:19;  Status DC


Citalopram Hydrobromide (CeleXA) 20 mg DAILY PO  Last administered on 4/14/18at 

08:39;  Start 4/12/18 at 09:00;  Stop 4/14/18 at 18:32;  Status DC


Lactobacillus Rhamnosus (Culturelle) 1 cap BID PO  Last administered on 4/20/ 18at 19:40;  Start 4/12/18 at 09:00


Amoxicillin (Amoxil) 500 mg [id number] PO  Last administered on 4/19/18at 08:35;  

Start 4/13/18 at 21:00;  Stop 4/19/18 at 09:00;  Status DC


Ciprofloxacin (Cipro) 250 mg BID PO  Last administered on 4/19/18at 08:35;  

Start 4/13/18 at 21:00;  Stop 4/19/18 at 09:00;  Status DC


Lactobacillus Rhamnosus (Culturelle) 1 cap BID PO ;  Start 4/13/18 at 21:00;  

Stop 4/13/18 at 21:00;  Status DC


Lorazepam (Ativan) 0.75 mg HS PO  Last administered on 4/20/18at 19:42;  Start 4 /14/18 at 21:00


Lorazepam (Ativan) 1 mg QHS PO  Last administered on 4/13/18at 19:42;  Start 4/ 13/18 at 21:00;  Stop 4/14/18 at 20:30;  Status DC


Fluvoxamine Maleate (Luvox) 25 mg QHS PO  Last administered on 4/16/18at 20:01;

  Start 4/14/18 at 21:00;  Stop 4/16/18 at 23:00;  Status DC


Fluvoxamine Maleate (Luvox) 50 mg HS PO  Last administered on 4/20/18at 19:40;  

Start 4/17/18 at 21:00


Olanzapine (ZyPREXA ZYDIS) 2.5 mg PRN Q2HR  PRN PO PSYCHOSIS Last administered 

on 4/20/18at 12:08;  Start 4/16/18 at 18:45


Lorazepam (Ativan) 0.5 mg DAILY PO  Last administered on 4/20/18at 08:58;  

Start 4/18/18 at 09:00


Lorazepam (Ativan) 0.75 mg 1600 PO  Last administered on 4/20/18at 15:44;  

Start 4/18/18 at 16:00


Quetiapine Fumarate (SEROquel) 12.5 mg 0900,1300,1700 PO  Last administered on 4 /19/18at 16:22;  Start 4/19/18 at 09:00;  Stop 4/19/18 at 18:23;  Status DC


Quetiapine Fumarate (SEROquel) 25 mg 0900,1300,1700 PO  Last administered on 4/ 20/18at 16:52;  Start 4/20/18 at 09:00;  Stop 4/20/18 at 18:14;  Status DC


Vitamin D (Vitamin D3) 50,000 unit WEEKLY PO  Last administered on 4/20/18at 09:

15;  Start 4/20/18 at 09:00


Quetiapine Fumarate (SEROquel) 37.5 mg 0900,1300,1700 PO ;  Start 4/21/18 at 09:

00





Active Scripts


Active


Reported


Bactrim Ds Tablet (Sulfamethoxazole/Trimethoprim) 1 Each Tablet 1 Each PO BID


Pantoprazole Sodium 40 Mg Tablet.dr 40 Mg PO DAILY


Trileptal (Oxcarbazepine) 300 Mg Tablet 600 Mg PO BID


Zyprexa (Olanzapine) 5 Mg Tablet 5 Mg PO PRN BID PRN


NICODERM CQ 14mg (Nicotine) 1 Each Patch.td24 1 Patch TD DAILY


Minoxidil 2.5 Mg Tablet 10 Mg PO DAILY


Cozaar (Losartan Potassium) 50 Mg Tablet 50 Mg PO DAILY


Lorazepam 1 Mg Tablet 1 Mg PO HS


Lorazepam 0.5 Mg Tablet 0.75 Mg PO BID94


Lamictal (Lamotrigine) 150 Mg Tablet 150 Mg PO BID


Probiotic (Lactobacillus Acidophilus) 1 Each Capsule 1 Each PO TIDWMEALS


Duoneb 0.5-3(2.5) Mg/3 Ml (Albuterol/Ipratropium) 3 Ml Ampul.neb 3 Ml NEB PRN 

Q4HRS PRN


Fluticasone Propionate Nasal Spray (Fluticasone Propionate) 16 Gm Spray.susp 2 

[place] NS DAILY


Escitalopram Oxalate 10 Mg Tablet 10 Mg PO DAILY


Donepezil Hcl 5 Mg Tablet 5 Mg PO HS


Dicyclomine Hcl 10 Mg Capsule 10 Mg PO BID


I have reviewed the current psychotropics carefully including drug 

interactions.  Risk benefit ratio favors no change other than as noted in my 

dictated progress note.





Diagnosis:


Problems:  


(1) Anxiety disorder


(2) Impulse control disorder


(3) Psychotic depression


(4) Major depressive disorder, recurrent episode


(5) Dementia, vascular, with depression


(6) Dementia, vascular, with delusions


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











NEGAR DANIEL MD Apr 20, 2018 20:51

## 2018-04-20 NOTE — PN
DATE:  04/18/2018



This is a late entry for 04/18/2018 and covers elements not covered in my

initial note of 04/18/2018.



SUBJECTIVE:  I met with the patient evening of 04/18/2018.  The patient is quite

resistive to treatment per nursing report, sarcastic, refused her medications. 

They had to be syringed x 1.  She threw herself out of the wheelchair, then put

herself on the ground.  No injuries were noted.  Dr. Macdonald saw her in the

evening, then she did better after that.



REVIEW OF SYSTEMS:  Ambulation impaired, in wheelchair.  No CV, , pulmonary,

eye system symptoms on review.



MENTAL STATUS EXAM:  Oriented to herself, situation at times.  Speech coherent,

a little pressured.  Abstraction fair, computation impaired, language function

intact.  Memory is impaired.  No active suicidal or homicidal ideation, but

quite labile.



LABORATORY DATA:  Reviewed.



IMPRESSION:  Unchanged from initial note.



PLAN:  Start Seroquel 12.5 mg 3 times a day as a mood stabilizer.  Continue rest

unchanged per initial note.





______________________________

MAN IRINEO DANIEL MD



DR:  REMY/cherrie  JOB#:  [id number] / [id number]

DD:  04/20/2018 11:54  DT:  04/20/2018 19:35

## 2018-04-21 VITALS — DIASTOLIC BLOOD PRESSURE: 52 MMHG | SYSTOLIC BLOOD PRESSURE: 125 MMHG

## 2018-04-21 VITALS — DIASTOLIC BLOOD PRESSURE: 57 MMHG | SYSTOLIC BLOOD PRESSURE: 115 MMHG

## 2018-04-21 RX ADMIN — LOSARTAN POTASSIUM SCH MG: 50 TABLET, FILM COATED ORAL at 07:30

## 2018-04-21 RX ADMIN — QUETIAPINE FUMARATE SCH MG: 25 TABLET, FILM COATED ORAL at 12:57

## 2018-04-21 RX ADMIN — Medication SCH CAP: at 20:23

## 2018-04-21 RX ADMIN — DONEPEZIL HYDROCHLORIDE SCH MG: 5 TABLET, FILM COATED ORAL at 20:23

## 2018-04-21 RX ADMIN — LAMOTRIGINE SCH MG: 150 TABLET ORAL at 20:23

## 2018-04-21 RX ADMIN — FLUTICASONE PROPIONATE SCH SPRAY: 50 SPRAY, METERED NASAL at 09:00

## 2018-04-21 RX ADMIN — LAMOTRIGINE SCH MG: 150 TABLET ORAL at 07:30

## 2018-04-21 RX ADMIN — MINOXIDIL SCH MG: 2.5 TABLET ORAL at 07:36

## 2018-04-21 RX ADMIN — QUETIAPINE FUMARATE SCH MG: 25 TABLET, FILM COATED ORAL at 07:36

## 2018-04-21 RX ADMIN — NICOTINE SCH PATCH: 14 PATCH, EXTENDED RELEASE TOPICAL at 07:31

## 2018-04-21 RX ADMIN — DICYCLOMINE HYDROCHLORIDE SCH MG: 10 CAPSULE ORAL at 07:30

## 2018-04-21 RX ADMIN — Medication SCH CAP: at 07:30

## 2018-04-21 RX ADMIN — PANTOPRAZOLE SODIUM SCH MG: 40 TABLET, DELAYED RELEASE ORAL at 07:30

## 2018-04-21 RX ADMIN — FLUTICASONE PROPIONATE SCH SPRAY: 50 SPRAY, METERED NASAL at 07:35

## 2018-04-21 RX ADMIN — DICYCLOMINE HYDROCHLORIDE SCH MG: 10 CAPSULE ORAL at 20:23

## 2018-04-21 RX ADMIN — QUETIAPINE FUMARATE SCH MG: 25 TABLET, FILM COATED ORAL at 18:06

## 2018-04-21 NOTE — PDOC
Exam


Note:


Sanchez Note:


Please also refer to the separate dictated note~for this date of service 

dictated separately.~Patient seen individually. Discussed the patient with 

Nursing staff reviewed the chart.~Reviewed interim history and current 

functioning. Reviewed vital signs,~Labs/ Radiology~and current medications 

noted below. Continue current treatment with the changes noted in the dictated 

addendum note





Assessment:


Vital Signs:





 Vital Signs








  Date Time  Temp Pulse Resp B/P (MAP) Pulse Ox O2 Delivery O2 Flow Rate FiO2


 


4/21/18 16:12 98.1 109 20 115/57 (76) 97 Room Air  








I&O











Intake and Output 


 


 4/21/18





 07:00


 


Intake Total 480 ml


 


Balance 480 ml


 


 


 


Intake Oral 480 ml


 


# Bowel Movements 1











Current Medications:


Meds:





Current Medications


Acetaminophen (Tylenol) 650 mg PRN Q6HRS  PRN PO PAIN / TEMP Last administered 

on 4/20/18at 21:00;  Start 4/11/18 at 19:30


Multi-Ingredient Ointment (Analgesic Balm) 1 [place] PRN QID  PRN TP MUSCLE PAIN;  

Start 4/11/18 at 19:30


Al Hydroxide/Mg Hydroxide (Mylanta Plus Xs) 15 ml PRN AFTMEALHC  PRN PO 

DYSPEPSIA;  Start 4/11/18 at 19:30


Magnesium Hydroxide (Milk Of Magnesia) 2,400 mg PRN QHS  PRN PO CONSTIPATION;  

Start 4/11/18 at 19:30


Donepezil HCl (Aricept) 5 mg HS PO  Last administered on 4/21/18at 20:23;  

Start 4/11/18 at 23:00


Lorazepam (Ativan) 0.75 mg BID94 PO  Last administered on 4/17/18at 16:07;  

Start 4/12/18 at 09:00;  Stop 4/17/18 at 18:34;  Status DC


Lorazepam (Ativan) 1 mg HS PO  Last administered on 4/12/18at 20:04;  Start 4/11 /18 at 23:00;  Stop 4/13/18 at 18:25;  Status DC


Olanzapine (ZyPREXA) 5 mg PRN BID  PRN PO ANXIETY / AGITATION Last administered 

on 4/16/18at 13:32;  Start 4/11/18 at 22:00;  Stop 4/16/18 at 18:40;  Status DC


Dicyclomine HCl (Bentyl) 10 mg BID PO  Last administered on 4/21/18at 20:23;  

Start 4/11/18 at 23:00


Fluticasone Propionate (Flonase) 2 spray DAILY NS  Last administered on 4/20/ 18at 08:58;  Start 4/12/18 at 09:00


Albuterol/ Ipratropium (Duoneb) 3 ml PRN Q4HRS  PRN NEB SHORTNESS OF BREATH;  

Start 4/11/18 at 22:00


Lamotrigine (LaMICtal) 150 mg BID PO  Last administered on 4/21/18at 20:23;  

Start 4/11/18 at 23:00


Losartan Potassium (Cozaar) 50 mg DAILY PO  Last administered on 4/21/18at 07:30

;  Start 4/12/18 at 09:00


Minoxidil (Loniten) 10 mg DAILY PO  Last administered on 4/21/18at 07:36;  

Start 4/12/18 at 09:00


Nicotine (Nicoderm Cq 14mg) 1 patch DAILY TD  Last administered on 4/21/18at 07:

31;  Start 4/12/18 at 09:00


Oxcarbazepine (Trileptal) 600 mg BID PO  Last administered on 4/21/18at 20:24;  

Start 4/12/18 at 09:00


Pantoprazole Sodium (Protonix) 40 mg DAILY PO  Last administered on 4/21/18at 07

:30;  Start 4/12/18 at 09:00


Trimethoprim/ Sulfamethoxazole (Bactrim Ds) 1 tab BID PO  Last administered on 4 /13/18at 09:00;  Start 4/11/18 at 23:00;  Stop 4/13/18 at 16:19;  Status DC


Citalopram Hydrobromide (CeleXA) 20 mg DAILY PO  Last administered on 4/14/18at 

08:39;  Start 4/12/18 at 09:00;  Stop 4/14/18 at 18:32;  Status DC


Lactobacillus Rhamnosus (Culturelle) 1 cap BID PO  Last administered on 4/21/ 18at 20:23;  Start 4/12/18 at 09:00


Amoxicillin (Amoxil) 500 mg [id number] PO  Last administered on 4/19/18at 08:35;  

Start 4/13/18 at 21:00;  Stop 4/19/18 at 09:00;  Status DC


Ciprofloxacin (Cipro) 250 mg BID PO  Last administered on 4/19/18at 08:35;  

Start 4/13/18 at 21:00;  Stop 4/19/18 at 09:00;  Status DC


Lactobacillus Rhamnosus (Culturelle) 1 cap BID PO ;  Start 4/13/18 at 21:00;  

Stop 4/13/18 at 21:00;  Status DC


Lorazepam (Ativan) 0.75 mg HS PO  Last administered on 4/20/18at 19:42;  Start 4 /14/18 at 21:00


Lorazepam (Ativan) 1 mg QHS PO  Last administered on 4/13/18at 19:42;  Start 4/ 13/18 at 21:00;  Stop 4/14/18 at 20:30;  Status DC


Fluvoxamine Maleate (Luvox) 25 mg QHS PO  Last administered on 4/16/18at 20:01;

  Start 4/14/18 at 21:00;  Stop 4/16/18 at 23:00;  Status DC


Fluvoxamine Maleate (Luvox) 50 mg HS PO  Last administered on 4/21/18at 20:24;  

Start 4/17/18 at 21:00


Olanzapine (ZyPREXA ZYDIS) 2.5 mg PRN Q2HR  PRN PO PSYCHOSIS Last administered 

on 4/20/18at 12:08;  Start 4/16/18 at 18:45


Lorazepam (Ativan) 0.5 mg DAILY PO  Last administered on 4/20/18at 08:58;  

Start 4/18/18 at 09:00


Lorazepam (Ativan) 0.75 mg 1600 PO  Last administered on 4/21/18at 18:06;  

Start 4/18/18 at 16:00


Quetiapine Fumarate (SEROquel) 12.5 mg 0900,1300,1700 PO  Last administered on 4 /19/18at 16:22;  Start 4/19/18 at 09:00;  Stop 4/19/18 at 18:23;  Status DC


Quetiapine Fumarate (SEROquel) 25 mg 0900,1300,1700 PO  Last administered on 4/ 20/18at 16:52;  Start 4/20/18 at 09:00;  Stop 4/20/18 at 18:14;  Status DC


Vitamin D (Vitamin D3) 50,000 unit WEEKLY PO  Last administered on 4/20/18at 09:

15;  Start 4/20/18 at 09:00


Quetiapine Fumarate (SEROquel) 37.5 mg 0900,1300,1700 PO  Last administered on 4 /21/18at 18:06;  Start 4/21/18 at 09:00





Active Scripts


Active


Reported


Bactrim Ds Tablet (Sulfamethoxazole/Trimethoprim) 1 Each Tablet 1 Each PO BID


Pantoprazole Sodium 40 Mg Tablet.dr 40 Mg PO DAILY


Trileptal (Oxcarbazepine) 300 Mg Tablet 600 Mg PO BID


Zyprexa (Olanzapine) 5 Mg Tablet 5 Mg PO PRN BID PRN


NICODERM CQ 14mg (Nicotine) 1 Each Patch.td24 1 Patch TD DAILY


Minoxidil 2.5 Mg Tablet 10 Mg PO DAILY


Cozaar (Losartan Potassium) 50 Mg Tablet 50 Mg PO DAILY


Lorazepam 1 Mg Tablet 1 Mg PO HS


Lorazepam 0.5 Mg Tablet 0.75 Mg PO BID94


Lamictal (Lamotrigine) 150 Mg Tablet 150 Mg PO BID


Probiotic (Lactobacillus Acidophilus) 1 Each Capsule 1 Each PO TIDWMEALS


Duoneb 0.5-3(2.5) Mg/3 Ml (Albuterol/Ipratropium) 3 Ml Ampul.neb 3 Ml NEB PRN 

Q4HRS PRN


Fluticasone Propionate Nasal Spray (Fluticasone Propionate) 16 Gm Spray.susp 2 

[place] NS DAILY


Escitalopram Oxalate 10 Mg Tablet 10 Mg PO DAILY


Donepezil Hcl 5 Mg Tablet 5 Mg PO HS


Dicyclomine Hcl 10 Mg Capsule 10 Mg PO BID


I have reviewed the current psychotropics carefully including drug 

interactions.  Risk benefit ratio favors no change other than as noted in my 

dictated progress note.





Diagnosis:


Problems:  


(1) Anxiety disorder


(2) Impulse control disorder


(3) Psychotic depression


(4) Major depressive disorder, recurrent episode


(5) Dementia, vascular, with depression


(6) Dementia, vascular, with delusions


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











NEGAR DANIEL MD Apr 21, 2018 22:34

## 2018-04-22 VITALS — DIASTOLIC BLOOD PRESSURE: 83 MMHG | SYSTOLIC BLOOD PRESSURE: 168 MMHG

## 2018-04-22 VITALS — DIASTOLIC BLOOD PRESSURE: 68 MMHG | SYSTOLIC BLOOD PRESSURE: 176 MMHG

## 2018-04-22 LAB
ALBUMIN SERPL-MCNC: 3.5 G/DL (ref 3.4–5)
ALBUMIN/GLOB SERPL: 0.9 {RATIO} (ref 1–1.7)
ALP SERPL-CCNC: 106 U/L (ref 46–116)
ALT SERPL-CCNC: 25 U/L (ref 14–59)
AMORPH SED URNS QL MICRO: PRESENT /HPF
ANION GAP SERPL CALC-SCNC: 8 MMOL/L (ref 6–14)
APTT PPP: (no result) S
AST SERPL-CCNC: 21 U/L (ref 15–37)
BACTERIA #/AREA URNS HPF: 0 /HPF
BASOPHILS # BLD AUTO: 0 X10^3/UL (ref 0–0.2)
BASOPHILS NFR BLD: 1 % (ref 0–3)
BILIRUB SERPL-MCNC: 0.1 MG/DL (ref 0.2–1)
BILIRUB UR QL STRIP: (no result)
BUN/CREAT SERPL: 21 (ref 6–20)
CA-I SERPL ISE-MCNC: 15 MG/DL (ref 7–20)
CALCIUM SERPL-MCNC: 9 MG/DL (ref 8.5–10.1)
CHLORIDE SERPL-SCNC: 102 MMOL/L (ref 98–107)
CO2 SERPL-SCNC: 29 MMOL/L (ref 21–32)
CREAT SERPL-MCNC: 0.7 MG/DL (ref 0.6–1)
EOSINOPHIL NFR BLD: 0.1 X10^3/UL (ref 0–0.7)
EOSINOPHIL NFR BLD: 2 % (ref 0–3)
ERYTHROCYTE [DISTWIDTH] IN BLOOD BY AUTOMATED COUNT: 16.6 % (ref 11.5–14.5)
FIBRINOGEN PPP-MCNC: (no result) MG/DL
GFR SERPLBLD BASED ON 1.73 SQ M-ARVRAT: 82.5 ML/MIN
GLOBULIN SER-MCNC: 3.8 G/DL (ref 2.2–3.8)
GLUCOSE SERPL-MCNC: 80 MG/DL (ref 70–99)
GLUCOSE UR STRIP-MCNC: (no result) MG/DL
HCT VFR BLD CALC: 34.6 % (ref 36–47)
HGB BLD-MCNC: 11.9 G/DL (ref 12–15.5)
LYMPHOCYTES # BLD: 1.7 X10^3/UL (ref 1–4.8)
LYMPHOCYTES NFR BLD AUTO: 25 % (ref 24–48)
MCH RBC QN AUTO: 33 PG (ref 25–35)
MCHC RBC AUTO-ENTMCNC: 34 G/DL (ref 31–37)
MCV RBC AUTO: 96 FL (ref 79–100)
MONO #: 0.8 X10^3/UL (ref 0–1.1)
MONOCYTES NFR BLD: 11 % (ref 0–9)
NEUT #: 4.2 X10^3UL (ref 1.8–7.7)
NEUTROPHILS NFR BLD AUTO: 62 % (ref 31–73)
NITRITE UR QL STRIP: (no result)
PLATELET # BLD AUTO: 413 X10^3/UL (ref 140–400)
POTASSIUM SERPL-SCNC: 4.3 MMOL/L (ref 3.5–5.1)
PROT SERPL-MCNC: 7.3 G/DL (ref 6.4–8.2)
RBC # BLD AUTO: 3.6 X10^6/UL (ref 3.5–5.4)
RBC #/AREA URNS HPF: (no result) /HPF (ref 0–2)
SODIUM SERPL-SCNC: 139 MMOL/L (ref 136–145)
SP GR UR STRIP: <=1.005
SQUAMOUS #/AREA URNS LPF: (no result) /LPF
UROBILINOGEN UR-MCNC: 0.2 MG/DL
WBC # BLD AUTO: 6.9 X10^3/UL (ref 4–11)
WBC #/AREA URNS HPF: (no result) /HPF (ref 0–4)

## 2018-04-22 RX ADMIN — LOSARTAN POTASSIUM SCH MG: 50 TABLET, FILM COATED ORAL at 08:13

## 2018-04-22 RX ADMIN — QUETIAPINE FUMARATE SCH MG: 25 TABLET, FILM COATED ORAL at 08:15

## 2018-04-22 RX ADMIN — PANTOPRAZOLE SODIUM SCH MG: 40 TABLET, DELAYED RELEASE ORAL at 08:15

## 2018-04-22 RX ADMIN — QUETIAPINE FUMARATE SCH MG: 25 TABLET, FILM COATED ORAL at 12:39

## 2018-04-22 RX ADMIN — Medication SCH CAP: at 21:54

## 2018-04-22 RX ADMIN — ACETAMINOPHEN PRN MG: 325 TABLET, FILM COATED ORAL at 08:21

## 2018-04-22 RX ADMIN — ACETAMINOPHEN PRN MG: 325 TABLET, FILM COATED ORAL at 18:37

## 2018-04-22 RX ADMIN — Medication SCH CAP: at 08:14

## 2018-04-22 RX ADMIN — ONDANSETRON PRN MG: 4 TABLET, ORALLY DISINTEGRATING ORAL at 19:37

## 2018-04-22 RX ADMIN — QUETIAPINE FUMARATE SCH MG: 25 TABLET, FILM COATED ORAL at 16:43

## 2018-04-22 RX ADMIN — MINOXIDIL SCH MG: 2.5 TABLET ORAL at 08:16

## 2018-04-22 RX ADMIN — ONDANSETRON PRN MG: 4 TABLET, ORALLY DISINTEGRATING ORAL at 19:25

## 2018-04-22 RX ADMIN — FLUTICASONE PROPIONATE SCH SPRAY: 50 SPRAY, METERED NASAL at 08:58

## 2018-04-22 RX ADMIN — LAMOTRIGINE SCH MG: 150 TABLET ORAL at 08:14

## 2018-04-22 RX ADMIN — DICYCLOMINE HYDROCHLORIDE SCH MG: 10 CAPSULE ORAL at 21:53

## 2018-04-22 RX ADMIN — DONEPEZIL HYDROCHLORIDE SCH MG: 5 TABLET, FILM COATED ORAL at 21:53

## 2018-04-22 RX ADMIN — FLUTICASONE PROPIONATE SCH SPRAY: 50 SPRAY, METERED NASAL at 08:21

## 2018-04-22 RX ADMIN — LAMOTRIGINE SCH MG: 150 TABLET ORAL at 21:54

## 2018-04-22 RX ADMIN — NICOTINE SCH PATCH: 14 PATCH, EXTENDED RELEASE TOPICAL at 08:16

## 2018-04-22 RX ADMIN — DICYCLOMINE HYDROCHLORIDE SCH MG: 10 CAPSULE ORAL at 08:13

## 2018-04-22 NOTE — PN
DATE:  04/20/2018



This is a late entry date of service 04/20/2018 covers elements not covered in

my initial note 04/20/2018.  Met with the patient in the evening of 04/20/2018. 

The patient slept 6-3/4 hours previous evening quite resistive, aggressive,

combative, arguing constantly.   came for the 50th wedding anniversary

and she was quite dismissive of him as well.  She refuses to have the EEG

recommended by Dr. Macdonald, noncompliant with medications, puts herself on the

floor.



REVIEW OF SYSTEMS:  Ambulation impaired.  No CV, , pulmonary, eye, ENT system

symptoms on review, vague somatic symptoms.



MENTAL STATUS EXAM:  Oriented to herself and situation.  Speech coherent.  Has

some latency.  Abstraction fair, computation impaired, language function intact,

attention span short.  Mood and affect remains somewhat labile.



LABORATORY DATA:  Reviewed.



IMPRESSION:  Major depressive disorder with psychotic features, rule out bipolar

1 disorder, mixed  Major neurocognitive disorder, Alzheimer, vascular with

delusion, depression.



PLAN:  Continue Trileptal 600 mg twice a day, Lamictal 150 mg twice a day for

seizure disorder.  Increase Seroquel from 25 mg 3 times a day to 37.5 mg 3 times

a day.  Continue rest unchanged.





______________________________

MAN IRINEO DANIEL MD



DR:  REMY/cherrie  JOB#:  [id number] / [id number]

DD:  04/22/2018 09:18  DT:  04/22/2018 10:14

## 2018-04-22 NOTE — RAD
AP chest x-ray



History: Leukocytosis.



Findings:

Borderline cardiomegaly may be magnified by the AP portable technique. 

Thoracic aortic calcified plaque.  No pneumothorax, pulmonary opacities or

pleural effusions.  There are diffuse coarsened interstitial markings.  At the

left lung base above the heart border there is a 1 cm nodular density

projecting over the posterior eighth rib.



Impression:

1.  Diffuse coarsened interstitial markings could indicate chronic

interstitial lung disease or an atypical infection.  Pulmonary interstitial

edema is a secondary consideration.



2.  1 cm nodular density at the left lung base, a pulmonary nodule is a

possibility.  This is to high relative to the breast to be a nipple shadow.

## 2018-04-22 NOTE — PDOC
Exam


Note:


Sanchez Note:


Please also refer to the separate dictated note~for this date of service 

dictated separately.~Patient seen individually. Discussed the patient with 

Nursing staff reviewed the chart.~Reviewed interim history and current 

functioning. Reviewed vital signs,~Labs/ Radiology~and current medications 

noted below. Continue current treatment with the changes noted in the dictated 

addendum note





Assessment:


Vital Signs:





 Vital Signs








  Date Time  Temp Pulse Resp B/P (MAP) Pulse Ox O2 Delivery O2 Flow Rate FiO2


 


4/22/18 15:42 97.6 92 20 168/83 (111) 98   


 


4/22/18 06:18      Room Air  








I&O











Intake and Output 


 


 4/22/18





 07:00


 


Intake Total 605 ml


 


Output Total 450 ml


 


Balance 155 ml


 


 


 


Intake Oral 605 ml


 


Output Urine Total 450 ml


 


# Bowel Movements 1








Labs:





 Laboratory Tests








Test


  4/22/18


07:37


 


White Blood Count


  6.9 x10^3/uL


(4.0-11.0)  #


 


Red Blood Count


  3.60 x10^6/uL


(3.50-5.40)


 


Hemoglobin


  11.9 g/dL


(12.0-15.5)  L


 


Hematocrit


  34.6 %


(36.0-47.0)  L


 


Mean Corpuscular Volume


  96 fL ()


 


 


Mean Corpuscular Hemoglobin 33 pg (25-35)  


 


Mean Corpuscular Hemoglobin


Concent 34 g/dL


(31-37)


 


Red Cell Distribution Width


  16.6 %


(11.5-14.5)  H


 


Platelet Count


  413 x10^3/uL


(140-400)  H


 


Neutrophils (%) (Auto) 62 % (31-73)  


 


Lymphocytes (%) (Auto) 25 % (24-48)  


 


Monocytes (%) (Auto) 11 % (0-9)  H


 


Eosinophils (%) (Auto) 2 % (0-3)  


 


Basophils (%) (Auto) 1 % (0-3)  


 


Neutrophils # (Auto)


  4.2 x10^3uL


(1.8-7.7)


 


Lymphocytes # (Auto)


  1.7 x10^3/uL


(1.0-4.8)


 


Monocytes # (Auto)


  0.8 x10^3/uL


(0.0-1.1)


 


Eosinophils # (Auto)


  0.1 x10^3/uL


(0.0-0.7)


 


Basophils # (Auto)


  0.0 x10^3/uL


(0.0-0.2)


 


Sodium Level


  139 mmol/L


(136-145)


 


Potassium Level


  4.3 mmol/L


(3.5-5.1)


 


Chloride Level


  102 mmol/L


()


 


Carbon Dioxide Level


  29 mmol/L


(21-32)


 


Anion Gap 8 (6-14)  


 


Blood Urea Nitrogen


  15 mg/dL


(7-20)


 


Creatinine


  0.7 mg/dL


(0.6-1.0)


 


Estimated GFR


(Cockcroft-Gault) 82.5  


 


 


BUN/Creatinine Ratio 21 (6-20)  H


 


Glucose Level


  80 mg/dL


(70-99)


 


Calcium Level


  9.0 mg/dL


(8.5-10.1)


 


Total Bilirubin


  0.1 mg/dL


(0.2-1.0)  L


 


Aspartate Amino Transferase


(AST) 21 U/L (15-37)


 


 


Alanine Aminotransferase (ALT)


  25 U/L (14-59)


 


 


Alkaline Phosphatase


  106 U/L


()


 


Total Protein


  7.3 g/dL


(6.4-8.2)


 


Albumin


  3.5 g/dL


(3.4-5.0)


 


Albumin/Globulin Ratio


  0.9 (1.0-1.7)


L











Current Medications:


Meds:





Current Medications


Acetaminophen (Tylenol) 650 mg PRN Q6HRS  PRN PO PAIN / TEMP Last administered 

on 4/22/18at 18:37;  Start 4/11/18 at 19:30


Multi-Ingredient Ointment (Analgesic Balm) 1 logan PRN QID  PRN TP MUSCLE PAIN;  

Start 4/11/18 at 19:30


Al Hydroxide/Mg Hydroxide (Mylanta Plus Xs) 15 ml PRN AFTMEALHC  PRN PO 

DYSPEPSIA;  Start 4/11/18 at 19:30


Magnesium Hydroxide (Milk Of Magnesia) 2,400 mg PRN QHS  PRN PO CONSTIPATION;  

Start 4/11/18 at 19:30


Donepezil HCl (Aricept) 5 mg HS PO  Last administered on 4/21/18at 20:23;  

Start 4/11/18 at 23:00


Lorazepam (Ativan) 0.75 mg BID94 PO  Last administered on 4/17/18at 16:07;  

Start 4/12/18 at 09:00;  Stop 4/17/18 at 18:34;  Status DC


Lorazepam (Ativan) 1 mg HS PO  Last administered on 4/12/18at 20:04;  Start 4/11 /18 at 23:00;  Stop 4/13/18 at 18:25;  Status DC


Olanzapine (ZyPREXA) 5 mg PRN BID  PRN PO ANXIETY / AGITATION Last administered 

on 4/16/18at 13:32;  Start 4/11/18 at 22:00;  Stop 4/16/18 at 18:40;  Status DC


Dicyclomine HCl (Bentyl) 10 mg BID PO  Last administered on 4/22/18at 08:13;  

Start 4/11/18 at 23:00


Fluticasone Propionate (Flonase) 2 spray DAILY NS  Last administered on 4/20/ 18at 08:58;  Start 4/12/18 at 09:00


Albuterol/ Ipratropium (Duoneb) 3 ml PRN Q4HRS  PRN NEB SHORTNESS OF BREATH;  

Start 4/11/18 at 22:00


Lamotrigine (LaMICtal) 150 mg BID PO  Last administered on 4/22/18at 08:14;  

Start 4/11/18 at 23:00


Losartan Potassium (Cozaar) 50 mg DAILY PO  Last administered on 4/22/18at 08:13

;  Start 4/12/18 at 09:00


Minoxidil (Loniten) 10 mg DAILY PO  Last administered on 4/22/18at 08:16;  

Start 4/12/18 at 09:00


Nicotine (Nicoderm Cq 14mg) 1 patch DAILY TD  Last administered on 4/22/18at 08:

16;  Start 4/12/18 at 09:00


Oxcarbazepine (Trileptal) 600 mg BID PO  Last administered on 4/22/18at 08:16;  

Start 4/12/18 at 09:00


Pantoprazole Sodium (Protonix) 40 mg DAILY PO  Last administered on 4/22/18at 08

:15;  Start 4/12/18 at 09:00


Trimethoprim/ Sulfamethoxazole (Bactrim Ds) 1 tab BID PO  Last administered on 4 /13/18at 09:00;  Start 4/11/18 at 23:00;  Stop 4/13/18 at 16:19;  Status DC


Citalopram Hydrobromide (CeleXA) 20 mg DAILY PO  Last administered on 4/14/18at 

08:39;  Start 4/12/18 at 09:00;  Stop 4/14/18 at 18:32;  Status DC


Lactobacillus Rhamnosus (Culturelle) 1 cap BID PO  Last administered on 4/22/ 18at 08:14;  Start 4/12/18 at 09:00


Amoxicillin (Amoxil) 500 mg [id number] PO  Last administered on 4/19/18at 08:35;  

Start 4/13/18 at 21:00;  Stop 4/19/18 at 09:00;  Status DC


Ciprofloxacin (Cipro) 250 mg BID PO  Last administered on 4/19/18at 08:35;  

Start 4/13/18 at 21:00;  Stop 4/19/18 at 09:00;  Status DC


Lactobacillus Rhamnosus (Culturelle) 1 cap BID PO ;  Start 4/13/18 at 21:00;  

Stop 4/13/18 at 21:00;  Status DC


Lorazepam (Ativan) 0.75 mg HS PO  Last administered on 4/20/18at 19:42;  Start 4 /14/18 at 21:00


Lorazepam (Ativan) 1 mg QHS PO  Last administered on 4/13/18at 19:42;  Start 4/ 13/18 at 21:00;  Stop 4/14/18 at 20:30;  Status DC


Fluvoxamine Maleate (Luvox) 25 mg QHS PO  Last administered on 4/16/18at 20:01;

  Start 4/14/18 at 21:00;  Stop 4/16/18 at 23:00;  Status DC


Fluvoxamine Maleate (Luvox) 50 mg HS PO  Last administered on 4/21/18at 20:24;  

Start 4/17/18 at 21:00


Olanzapine (ZyPREXA ZYDIS) 2.5 mg PRN Q2HR  PRN PO PSYCHOSIS Last administered 

on 4/20/18at 12:08;  Start 4/16/18 at 18:45


Lorazepam (Ativan) 0.5 mg DAILY PO  Last administered on 4/22/18at 08:21;  

Start 4/18/18 at 09:00


Lorazepam (Ativan) 0.75 mg 1600 PO  Last administered on 4/22/18at 16:44;  

Start 4/18/18 at 16:00


Quetiapine Fumarate (SEROquel) 12.5 mg 0900,1300,1700 PO  Last administered on 4 /19/18at 16:22;  Start 4/19/18 at 09:00;  Stop 4/19/18 at 18:23;  Status DC


Quetiapine Fumarate (SEROquel) 25 mg 0900,1300,1700 PO  Last administered on 4/ 20/18at 16:52;  Start 4/20/18 at 09:00;  Stop 4/20/18 at 18:14;  Status DC


Vitamin D (Vitamin D3) 50,000 unit WEEKLY PO  Last administered on 4/20/18at 09:

15;  Start 4/20/18 at 09:00


Quetiapine Fumarate (SEROquel) 37.5 mg 0900,1300,1700 PO  Last administered on 4 /22/18at 16:43;  Start 4/21/18 at 09:00


Ondansetron HCl (Zofran Odt) 4 mg PRN Q8HRS  PRN PO NAUSEA/VOMITING Last 

administered on 4/22/18at 19:37;  Start 4/22/18 at 15:15





Active Scripts


Active


Reported


Bactrim Ds Tablet (Sulfamethoxazole/Trimethoprim) 1 Each Tablet 1 Each PO BID


Pantoprazole Sodium 40 Mg Tablet.dr 40 Mg PO DAILY


Trileptal (Oxcarbazepine) 300 Mg Tablet 600 Mg PO BID


Zyprexa (Olanzapine) 5 Mg Tablet 5 Mg PO PRN BID PRN


NICODERM CQ 14mg (Nicotine) 1 Each Patch.td24 1 Patch TD DAILY


Minoxidil 2.5 Mg Tablet 10 Mg PO DAILY


Cozaar (Losartan Potassium) 50 Mg Tablet 50 Mg PO DAILY


Lorazepam 1 Mg Tablet 1 Mg PO HS


Lorazepam 0.5 Mg Tablet 0.75 Mg PO BID94


Lamictal (Lamotrigine) 150 Mg Tablet 150 Mg PO BID


Probiotic (Lactobacillus Acidophilus) 1 Each Capsule 1 Each PO TIDWMEALS


Duoneb 0.5-3(2.5) Mg/3 Ml (Albuterol/Ipratropium) 3 Ml Ampul.neb 3 Ml NEB PRN 

Q4HRS PRN


Fluticasone Propionate Nasal Spray (Fluticasone Propionate) 16 Gm Spray.susp 2 

[place] NS DAILY


Escitalopram Oxalate 10 Mg Tablet 10 Mg PO DAILY


Donepezil Hcl 5 Mg Tablet 5 Mg PO HS


Dicyclomine Hcl 10 Mg Capsule 10 Mg PO BID


I have reviewed the current psychotropics carefully including drug 

interactions.  Risk benefit ratio favors no change other than as noted in my 

dictated progress note.





Diagnosis:


Problems:  


(1) Anxiety disorder


(2) Impulse control disorder


(3) Psychotic depression


(4) Major depressive disorder, recurrent episode


(5) Dementia, vascular, with depression


(6) Dementia, vascular, with delusions


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











NEGAR DANIEL MD Apr 22, 2018 20:53

## 2018-04-23 VITALS — SYSTOLIC BLOOD PRESSURE: 127 MMHG | DIASTOLIC BLOOD PRESSURE: 67 MMHG

## 2018-04-23 VITALS — SYSTOLIC BLOOD PRESSURE: 174 MMHG | DIASTOLIC BLOOD PRESSURE: 78 MMHG

## 2018-04-23 LAB — LAMOTRIGINE SERPL-MCNC: 5.4 UG/ML (ref 2–20)

## 2018-04-23 RX ADMIN — PANTOPRAZOLE SODIUM SCH MG: 40 TABLET, DELAYED RELEASE ORAL at 09:00

## 2018-04-23 RX ADMIN — Medication SCH CAP: at 09:00

## 2018-04-23 RX ADMIN — LAMOTRIGINE SCH MG: 150 TABLET ORAL at 08:57

## 2018-04-23 RX ADMIN — Medication SCH CAP: at 19:25

## 2018-04-23 RX ADMIN — QUETIAPINE FUMARATE SCH MG: 25 TABLET, FILM COATED ORAL at 08:57

## 2018-04-23 RX ADMIN — DICYCLOMINE HYDROCHLORIDE SCH MG: 10 CAPSULE ORAL at 19:25

## 2018-04-23 RX ADMIN — ACETAMINOPHEN PRN MG: 325 TABLET, FILM COATED ORAL at 09:44

## 2018-04-23 RX ADMIN — LOSARTAN POTASSIUM SCH MG: 50 TABLET, FILM COATED ORAL at 08:57

## 2018-04-23 RX ADMIN — QUETIAPINE FUMARATE SCH MG: 25 TABLET, FILM COATED ORAL at 12:05

## 2018-04-23 RX ADMIN — FLUTICASONE PROPIONATE SCH SPRAY: 50 SPRAY, METERED NASAL at 09:00

## 2018-04-23 RX ADMIN — DICYCLOMINE HYDROCHLORIDE SCH MG: 10 CAPSULE ORAL at 09:00

## 2018-04-23 RX ADMIN — LAMOTRIGINE SCH MG: 150 TABLET ORAL at 19:25

## 2018-04-23 RX ADMIN — NICOTINE SCH PATCH: 14 PATCH, EXTENDED RELEASE TOPICAL at 09:00

## 2018-04-23 RX ADMIN — ACETAMINOPHEN PRN MG: 325 TABLET, FILM COATED ORAL at 15:37

## 2018-04-23 RX ADMIN — MINOXIDIL SCH MG: 2.5 TABLET ORAL at 09:03

## 2018-04-23 RX ADMIN — DONEPEZIL HYDROCHLORIDE SCH MG: 5 TABLET, FILM COATED ORAL at 19:25

## 2018-04-23 RX ADMIN — QUETIAPINE FUMARATE SCH MG: 25 TABLET, FILM COATED ORAL at 16:54

## 2018-04-23 NOTE — RAD
KUB, 4/22/2018:



History: Abdominal tenderness, nausea and vomiting



The abdominal gas pattern is unremarkable.  There is no evidence of

organomegaly.  Extensive aortoiliac and femoral arterial calcifications are

present.  Lower pelvic calcifications are compatible with phleboliths.



IMPRESSION: No acute abdominal abnormality is detected.

## 2018-04-23 NOTE — PDOC
Exam


Note:


Sanchez Note:


Please also refer to the separate dictated note~for this date of service 

dictated separately.~Patient seen individually. Discussed the patient with 

Nursing staff reviewed the chart.~Reviewed interim history and current 

functioning. Reviewed vital signs,~Labs/ Radiology~and current medications 

noted below. Continue current treatment with the changes noted in the dictated 

addendum note





Assessment:


Vital Signs:





 Vital Signs








  Date Time  Temp Pulse Resp B/P (MAP) Pulse Ox O2 Delivery O2 Flow Rate FiO2


 


4/23/18 16:18 97.9 80 16 127/67 (87) 97   


 


4/23/18 06:33      Room Air  








I&O











Intake and Output 


 


 4/23/18





 07:00


 


Intake Total 960 ml


 


Output Total 350 ml


 


Balance 610 ml


 


 


 


Intake Oral 960 ml


 


Output Urine Total 350 ml











Current Medications:


Meds:





Current Medications


Acetaminophen (Tylenol) 650 mg PRN Q6HRS  PRN PO PAIN / TEMP Last administered 

on 4/23/18at 15:37;  Start 4/11/18 at 19:30


Multi-Ingredient Ointment (Analgesic Balm) 1 logan PRN QID  PRN TP MUSCLE PAIN;  

Start 4/11/18 at 19:30


Al Hydroxide/Mg Hydroxide (Mylanta Plus Xs) 15 ml PRN AFTMEALHC  PRN PO 

DYSPEPSIA;  Start 4/11/18 at 19:30


Magnesium Hydroxide (Milk Of Magnesia) 2,400 mg PRN QHS  PRN PO CONSTIPATION;  

Start 4/11/18 at 19:30


Donepezil HCl (Aricept) 5 mg HS PO  Last administered on 4/23/18at 19:25;  

Start 4/11/18 at 23:00


Lorazepam (Ativan) 0.75 mg BID94 PO  Last administered on 4/17/18at 16:07;  

Start 4/12/18 at 09:00;  Stop 4/17/18 at 18:34;  Status DC


Lorazepam (Ativan) 1 mg HS PO  Last administered on 4/12/18at 20:04;  Start 4/11 /18 at 23:00;  Stop 4/13/18 at 18:25;  Status DC


Olanzapine (ZyPREXA) 5 mg PRN BID  PRN PO ANXIETY / AGITATION Last administered 

on 4/16/18at 13:32;  Start 4/11/18 at 22:00;  Stop 4/16/18 at 18:40;  Status DC


Dicyclomine HCl (Bentyl) 10 mg BID PO  Last administered on 4/23/18at 19:25;  

Start 4/11/18 at 23:00


Fluticasone Propionate (Flonase) 2 spray DAILY NS  Last administered on 4/20/ 18at 08:58;  Start 4/12/18 at 09:00


Albuterol/ Ipratropium (Duoneb) 3 ml PRN Q4HRS  PRN NEB SHORTNESS OF BREATH;  

Start 4/11/18 at 22:00


Lamotrigine (LaMICtal) 150 mg BID PO  Last administered on 4/23/18at 19:25;  

Start 4/11/18 at 23:00


Losartan Potassium (Cozaar) 50 mg DAILY PO  Last administered on 4/23/18at 08:57

;  Start 4/12/18 at 09:00


Minoxidil (Loniten) 10 mg DAILY PO  Last administered on 4/23/18at 09:03;  

Start 4/12/18 at 09:00


Nicotine (Nicoderm Cq 14mg) 1 patch DAILY TD  Last administered on 4/22/18at 08:

16;  Start 4/12/18 at 09:00


Oxcarbazepine (Trileptal) 600 mg BID PO  Last administered on 4/23/18at 19:25;  

Start 4/12/18 at 09:00


Pantoprazole Sodium (Protonix) 40 mg DAILY PO  Last administered on 4/22/18at 08

:15;  Start 4/12/18 at 09:00


Trimethoprim/ Sulfamethoxazole (Bactrim Ds) 1 tab BID PO  Last administered on 4 /13/18at 09:00;  Start 4/11/18 at 23:00;  Stop 4/13/18 at 16:19;  Status DC


Citalopram Hydrobromide (CeleXA) 20 mg DAILY PO  Last administered on 4/14/18at 

08:39;  Start 4/12/18 at 09:00;  Stop 4/14/18 at 18:32;  Status DC


Lactobacillus Rhamnosus (Culturelle) 1 cap BID PO  Last administered on 4/23/ 18at 19:25;  Start 4/12/18 at 09:00


Amoxicillin (Amoxil) 500 mg [id number] PO  Last administered on 4/19/18at 08:35;  

Start 4/13/18 at 21:00;  Stop 4/19/18 at 09:00;  Status DC


Ciprofloxacin (Cipro) 250 mg BID PO  Last administered on 4/19/18at 08:35;  

Start 4/13/18 at 21:00;  Stop 4/19/18 at 09:00;  Status DC


Lactobacillus Rhamnosus (Culturelle) 1 cap BID PO ;  Start 4/13/18 at 21:00;  

Stop 4/13/18 at 21:00;  Status DC


Lorazepam (Ativan) 0.75 mg HS PO  Last administered on 4/23/18at 19:27;  Start 4 /14/18 at 21:00


Lorazepam (Ativan) 1 mg QHS PO  Last administered on 4/13/18at 19:42;  Start 4/ 13/18 at 21:00;  Stop 4/14/18 at 20:30;  Status DC


Fluvoxamine Maleate (Luvox) 25 mg QHS PO  Last administered on 4/16/18at 20:01;

  Start 4/14/18 at 21:00;  Stop 4/16/18 at 23:00;  Status DC


Fluvoxamine Maleate (Luvox) 50 mg HS PO  Last administered on 4/23/18at 19:25;  

Start 4/17/18 at 21:00


Olanzapine (ZyPREXA ZYDIS) 2.5 mg PRN Q2HR  PRN PO PSYCHOSIS Last administered 

on 4/20/18at 12:08;  Start 4/16/18 at 18:45


Lorazepam (Ativan) 0.5 mg DAILY PO  Last administered on 4/23/18at 08:57;  

Start 4/18/18 at 09:00


Lorazepam (Ativan) 0.75 mg 1600 PO  Last administered on 4/23/18at 15:36;  

Start 4/18/18 at 16:00


Quetiapine Fumarate (SEROquel) 12.5 mg 0900,1300,1700 PO  Last administered on 4 /19/18at 16:22;  Start 4/19/18 at 09:00;  Stop 4/19/18 at 18:23;  Status DC


Quetiapine Fumarate (SEROquel) 25 mg 0900,1300,1700 PO  Last administered on 4/ 20/18at 16:52;  Start 4/20/18 at 09:00;  Stop 4/20/18 at 18:14;  Status DC


Vitamin D (Vitamin D3) 50,000 unit WEEKLY PO  Last administered on 4/20/18at 09:

15;  Start 4/20/18 at 09:00


Quetiapine Fumarate (SEROquel) 37.5 mg 0900,1300,1700 PO  Last administered on 4 /23/18at 16:54;  Start 4/21/18 at 09:00;  Stop 4/23/18 at 18:12;  Status DC


Ondansetron HCl (Zofran Odt) 4 mg PRN Q8HRS  PRN PO NAUSEA/VOMITING Last 

administered on 4/22/18at 19:37;  Start 4/22/18 at 15:15


Quetiapine Fumarate (SEROquel) 50 mg 0900,1300,1700 PO ;  Start 4/24/18 at 09:00





Active Scripts


Active


Reported


Bactrim Ds Tablet (Sulfamethoxazole/Trimethoprim) 1 Each Tablet 1 Each PO BID


Pantoprazole Sodium 40 Mg Tablet.dr 40 Mg PO DAILY


Trileptal (Oxcarbazepine) 300 Mg Tablet 600 Mg PO BID


Zyprexa (Olanzapine) 5 Mg Tablet 5 Mg PO PRN BID PRN


NICODERM CQ 14mg (Nicotine) 1 Each Patch.td24 1 Patch TD DAILY


Minoxidil 2.5 Mg Tablet 10 Mg PO DAILY


Cozaar (Losartan Potassium) 50 Mg Tablet 50 Mg PO DAILY


Lorazepam 1 Mg Tablet 1 Mg PO HS


Lorazepam 0.5 Mg Tablet 0.75 Mg PO BID94


Lamictal (Lamotrigine) 150 Mg Tablet 150 Mg PO BID


Probiotic (Lactobacillus Acidophilus) 1 Each Capsule 1 Each PO TIDWMEALS


Duoneb 0.5-3(2.5) Mg/3 Ml (Albuterol/Ipratropium) 3 Ml Ampul.neb 3 Ml NEB PRN 

Q4HRS PRN


Fluticasone Propionate Nasal Spray (Fluticasone Propionate) 16 Gm Spray.susp 2 

[place] NS DAILY


Escitalopram Oxalate 10 Mg Tablet 10 Mg PO DAILY


Donepezil Hcl 5 Mg Tablet 5 Mg PO HS


Dicyclomine Hcl 10 Mg Capsule 10 Mg PO BID


I have reviewed the current psychotropics carefully including drug 

interactions.  Risk benefit ratio favors no change other than as noted in my 

dictated progress note.





Diagnosis:


Problems:  


(1) Anxiety disorder


(2) Impulse control disorder


(3) Psychotic depression


(4) Major depressive disorder, recurrent episode


(5) Dementia, vascular, with depression


(6) Dementia, vascular, with delusions


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











NEGAR DANIEL MD Apr 23, 2018 20:49

## 2018-04-23 NOTE — PN
DATE:  04/22/2018



PSYCHIATRIC PROGRESS NOTE



This is a late entry of 04/21/2018 covers elements not covered in my initial

note of 04/21/2018.  I met with the patient evening of 04/21/2018.  The patient

did well in the mornings, somewhat irritable and resistive in the afternoon. 

Refused afternoon medications ____ puts herself on the floor repeatedly.  We

will be checking labs in the morning.  ____ putting self on the floor was after

supper.



REVIEW OF SYSTEMS:  No CV, , pulmonary, eye system symptoms.  Gait is unsteady

____.



MENTAL STATUS EXAM:  She is oriented to herself, situation.  Speech moderate

latency, often responses monosyllabic.  Abstraction fair, computation impaired,

language function intact.  Attention span short.  Mood and affect remain labile.



LABORATORY DATA:  Reviewed.



IMPRESSION:  Unchanged, ____ continue psychotropics mentioned in the admission

notes.  Check labs and adjust ____.  Reduce the Ativan.  Increase Seroquel ____.





______________________________

MAN IRINEO DANIEL MD



DR:  REMY/cherrie  JOB#:  [id number] / [id number]

DD:  04/22/2018 16:45  DT:  04/23/2018 02:04

## 2018-04-24 VITALS — DIASTOLIC BLOOD PRESSURE: 91 MMHG | SYSTOLIC BLOOD PRESSURE: 127 MMHG

## 2018-04-24 RX ADMIN — QUETIAPINE FUMARATE SCH MG: 50 TABLET, FILM COATED ORAL at 17:18

## 2018-04-24 RX ADMIN — ACETAMINOPHEN PRN MG: 325 TABLET, FILM COATED ORAL at 08:26

## 2018-04-24 RX ADMIN — LAMOTRIGINE SCH MG: 150 TABLET ORAL at 08:22

## 2018-04-24 RX ADMIN — Medication SCH CAP: at 19:38

## 2018-04-24 RX ADMIN — NICOTINE SCH PATCH: 14 PATCH, EXTENDED RELEASE TOPICAL at 08:37

## 2018-04-24 RX ADMIN — LOSARTAN POTASSIUM SCH MG: 50 TABLET, FILM COATED ORAL at 08:22

## 2018-04-24 RX ADMIN — PANTOPRAZOLE SODIUM SCH MG: 40 TABLET, DELAYED RELEASE ORAL at 08:36

## 2018-04-24 RX ADMIN — QUETIAPINE FUMARATE SCH MG: 50 TABLET, FILM COATED ORAL at 08:37

## 2018-04-24 RX ADMIN — Medication SCH CAP: at 08:36

## 2018-04-24 RX ADMIN — FLUTICASONE PROPIONATE SCH SPRAY: 50 SPRAY, METERED NASAL at 08:36

## 2018-04-24 RX ADMIN — ACETAMINOPHEN PRN MG: 325 TABLET, FILM COATED ORAL at 19:40

## 2018-04-24 RX ADMIN — DICYCLOMINE HYDROCHLORIDE SCH MG: 10 CAPSULE ORAL at 08:24

## 2018-04-24 RX ADMIN — QUETIAPINE FUMARATE SCH MG: 50 TABLET, FILM COATED ORAL at 13:29

## 2018-04-24 RX ADMIN — DICYCLOMINE HYDROCHLORIDE SCH MG: 10 CAPSULE ORAL at 19:39

## 2018-04-24 RX ADMIN — ACETAMINOPHEN PRN MG: 325 TABLET, FILM COATED ORAL at 18:44

## 2018-04-24 RX ADMIN — ACETAMINOPHEN PRN MG: 325 TABLET, FILM COATED ORAL at 14:43

## 2018-04-24 RX ADMIN — DONEPEZIL HYDROCHLORIDE SCH MG: 5 TABLET, FILM COATED ORAL at 19:38

## 2018-04-24 RX ADMIN — HYOSCYAMINE SULFATE PRN MG: 0.12 TABLET, ORALLY DISINTEGRATING ORAL; SUBLINGUAL at 14:43

## 2018-04-24 RX ADMIN — LAMOTRIGINE SCH MG: 150 TABLET ORAL at 19:37

## 2018-04-24 RX ADMIN — MINOXIDIL SCH MG: 2.5 TABLET ORAL at 08:24

## 2018-04-24 NOTE — PDOC
Exam


Note:


Sanchez Note:


Please also refer to the separate dictated note~for this date of service 

dictated separately.~Patient seen individually. Discussed the patient with 

Nursing staff reviewed the chart.~Reviewed interim history and current 

functioning. Reviewed vital signs,~Labs/ Radiology~and current medications 

noted below. Continue current treatment with the changes noted in the dictated 

addendum note





Assessment:


Vital Signs:





 Vital Signs








  Date Time  Temp Pulse Resp B/P (MAP) Pulse Ox O2 Delivery O2 Flow Rate FiO2


 


4/24/18 08:24  85  127/91    


 


4/24/18 06:31 97.5  22  95   


 


4/23/18 06:33      Room Air  








I&O











Intake and Output 


 


 4/24/18





 07:00


 


Intake Total 900 ml


 


Output Total 1000 ml


 


Balance -100 ml


 


 


 


Intake Oral 900 ml


 


Output Urine Total 1000 ml


 


# Bowel Movements 1











Current Medications:


Meds:





Current Medications


Acetaminophen (Tylenol) 650 mg PRN Q6HRS  PRN PO PAIN / TEMP Last administered 

on 4/24/18at 08:26;  Start 4/11/18 at 19:30;  Stop 4/24/18 at 14:04;  Status DC


Multi-Ingredient Ointment (Analgesic Balm) 1 [place] PRN QID  PRN TP MUSCLE PAIN;  

Start 4/11/18 at 19:30


Al Hydroxide/Mg Hydroxide (Mylanta Plus Xs) 15 ml PRN AFTMEALHC  PRN PO 

DYSPEPSIA;  Start 4/11/18 at 19:30


Magnesium Hydroxide (Milk Of Magnesia) 2,400 mg PRN QHS  PRN PO CONSTIPATION;  

Start 4/11/18 at 19:30


Donepezil HCl (Aricept) 5 mg HS PO  Last administered on 4/24/18at 19:38;  

Start 4/11/18 at 23:00


Lorazepam (Ativan) 0.75 mg BID94 PO  Last administered on 4/17/18at 16:07;  

Start 4/12/18 at 09:00;  Stop 4/17/18 at 18:34;  Status DC


Lorazepam (Ativan) 1 mg HS PO  Last administered on 4/12/18at 20:04;  Start 4/11 /18 at 23:00;  Stop 4/13/18 at 18:25;  Status DC


Olanzapine (ZyPREXA) 5 mg PRN BID  PRN PO ANXIETY / AGITATION Last administered 

on 4/16/18at 13:32;  Start 4/11/18 at 22:00;  Stop 4/16/18 at 18:40;  Status DC


Dicyclomine HCl (Bentyl) 10 mg BID PO  Last administered on 4/24/18at 19:39;  

Start 4/11/18 at 23:00


Fluticasone Propionate (Flonase) 2 spray DAILY NS  Last administered on 4/20/ 18at 08:58;  Start 4/12/18 at 09:00


Albuterol/ Ipratropium (Duoneb) 3 ml PRN Q4HRS  PRN NEB SHORTNESS OF BREATH;  

Start 4/11/18 at 22:00


Lamotrigine (LaMICtal) 150 mg BID PO  Last administered on 4/24/18at 19:37;  

Start 4/11/18 at 23:00


Losartan Potassium (Cozaar) 50 mg DAILY PO  Last administered on 4/24/18at 08:22

;  Start 4/12/18 at 09:00


Minoxidil (Loniten) 10 mg DAILY PO  Last administered on 4/24/18at 08:24;  

Start 4/12/18 at 09:00


Nicotine (Nicoderm Cq 14mg) 1 patch DAILY TD  Last administered on 4/22/18at 08:

16;  Start 4/12/18 at 09:00


Oxcarbazepine (Trileptal) 600 mg BID PO  Last administered on 4/24/18at 19:38;  

Start 4/12/18 at 09:00


Pantoprazole Sodium (Protonix) 40 mg DAILY PO  Last administered on 4/22/18at 08

:15;  Start 4/12/18 at 09:00


Trimethoprim/ Sulfamethoxazole (Bactrim Ds) 1 tab BID PO  Last administered on 4 /13/18at 09:00;  Start 4/11/18 at 23:00;  Stop 4/13/18 at 16:19;  Status DC


Citalopram Hydrobromide (CeleXA) 20 mg DAILY PO  Last administered on 4/14/18at 

08:39;  Start 4/12/18 at 09:00;  Stop 4/14/18 at 18:32;  Status DC


Lactobacillus Rhamnosus (Culturelle) 1 cap BID PO  Last administered on 4/24/ 18at 19:38;  Start 4/12/18 at 09:00


Amoxicillin (Amoxil) 500 mg [id number] PO  Last administered on 4/19/18at 08:35;  

Start 4/13/18 at 21:00;  Stop 4/19/18 at 09:00;  Status DC


Ciprofloxacin (Cipro) 250 mg BID PO  Last administered on 4/19/18at 08:35;  

Start 4/13/18 at 21:00;  Stop 4/19/18 at 09:00;  Status DC


Lactobacillus Rhamnosus (Culturelle) 1 cap BID PO ;  Start 4/13/18 at 21:00;  

Stop 4/13/18 at 21:00;  Status DC


Lorazepam (Ativan) 0.75 mg HS PO  Last administered on 4/23/18at 19:27;  Start 4 /14/18 at 21:00;  Stop 4/24/18 at 19:26;  Status DC


Lorazepam (Ativan) 1 mg QHS PO  Last administered on 4/13/18at 19:42;  Start 4/ 13/18 at 21:00;  Stop 4/14/18 at 20:30;  Status DC


Fluvoxamine Maleate (Luvox) 25 mg QHS PO  Last administered on 4/16/18at 20:01;

  Start 4/14/18 at 21:00;  Stop 4/16/18 at 23:00;  Status DC


Fluvoxamine Maleate (Luvox) 50 mg HS PO  Last administered on 4/24/18at 19:38;  

Start 4/17/18 at 21:00


Olanzapine (ZyPREXA ZYDIS) 2.5 mg PRN Q2HR  PRN PO PSYCHOSIS Last administered 

on 4/24/18at 18:44;  Start 4/16/18 at 18:45


Lorazepam (Ativan) 0.5 mg DAILY PO  Last administered on 4/24/18at 08:25;  

Start 4/18/18 at 09:00


Lorazepam (Ativan) 0.75 mg 1600 PO  Last administered on 4/24/18at 17:18;  

Start 4/18/18 at 16:00


Quetiapine Fumarate (SEROquel) 12.5 mg 0900,1300,1700 PO  Last administered on 4 /19/18at 16:22;  Start 4/19/18 at 09:00;  Stop 4/19/18 at 18:23;  Status DC


Quetiapine Fumarate (SEROquel) 25 mg 0900,1300,1700 PO  Last administered on 4/ 20/18at 16:52;  Start 4/20/18 at 09:00;  Stop 4/20/18 at 18:14;  Status DC


Vitamin D (Vitamin D3) 50,000 unit WEEKLY PO  Last administered on 4/20/18at 09:

15;  Start 4/20/18 at 09:00


Quetiapine Fumarate (SEROquel) 37.5 mg 0900,1300,1700 PO  Last administered on 4 /23/18at 16:54;  Start 4/21/18 at 09:00;  Stop 4/23/18 at 18:12;  Status DC


Ondansetron HCl (Zofran Odt) 4 mg PRN Q8HRS  PRN PO NAUSEA/VOMITING Last 

administered on 4/22/18at 19:37;  Start 4/22/18 at 15:15


Quetiapine Fumarate (SEROquel) 50 mg 0900,1300,1700 PO  Last administered on 4/ 24/18at 17:18;  Start 4/24/18 at 09:00


Acetaminophen (Tylenol) 650 mg PRN Q4HRS  PRN PO PAIN / TEMP Last administered 

on 4/24/18at 19:40;  Start 4/24/18 at 14:15


Hyoscyamine (Anaspaz) 0.125 mg PRN Q4HRS  PRN PO STOMACH CRAMPING Last 

administered on 4/24/18at 14:43;  Start 4/24/18 at 14:15


Lorazepam (Ativan) 0.5 mg HS PO ;  Start 4/25/18 at 21:00





Active Scripts


Active


Reported


Bactrim Ds Tablet (Sulfamethoxazole/Trimethoprim) 1 Each Tablet 1 Each PO BID


Pantoprazole Sodium 40 Mg Tablet.dr 40 Mg PO DAILY


Trileptal (Oxcarbazepine) 300 Mg Tablet 600 Mg PO BID


Zyprexa (Olanzapine) 5 Mg Tablet 5 Mg PO PRN BID PRN


NICODERM CQ 14mg (Nicotine) 1 Each Patch.td24 1 Patch TD DAILY


Minoxidil 2.5 Mg Tablet 10 Mg PO DAILY


Cozaar (Losartan Potassium) 50 Mg Tablet 50 Mg PO DAILY


Lorazepam 1 Mg Tablet 1 Mg PO HS


Lorazepam 0.5 Mg Tablet 0.75 Mg PO [id number]


Lamictal (Lamotrigine) 150 Mg Tablet 150 Mg PO BID


Probiotic (Lactobacillus Acidophilus) 1 Each Capsule 1 Each PO TIDWMEALS


Duoneb 0.5-3(2.5) Mg/3 Ml (Albuterol/Ipratropium) 3 Ml Ampul.neb 3 Ml NEB PRN 

Q4HRS PRN


Fluticasone Propionate Nasal Spray (Fluticasone Propionate) 16 Gm Spray.susp 2 

Independence NS DAILY


Escitalopram Oxalate 10 Mg Tablet 10 Mg PO DAILY


Donepezil Hcl 5 Mg Tablet 5 Mg PO HS


Dicyclomine Hcl 10 Mg Capsule 10 Mg PO BID


I have reviewed the current psychotropics carefully including drug 

interactions.  Risk benefit ratio favors no change other than as noted in my 

dictated progress note.





Diagnosis:


Problems:  


(1) Anxiety disorder


(2) Impulse control disorder


(3) Psychotic depression


(4) Major depressive disorder, recurrent episode


(5) Dementia, vascular, with depression


(6) Dementia, vascular, with delusions


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











NEGAR DANIEL MD Apr 24, 2018 20:47

## 2018-04-24 NOTE — PN
DATE:  04/23/2018



This is a late entry 04/23/2017, covers elements not covered in my initial note

04/23/2018.



SUBJECTIVE:  I met with the patient evening of 04/23/2018.  The patient slept 7

hours previous evening.  She has had a very difficult day.  She ripped her

suprapubic catheter out previous night and this was replaced.  She has been

hateful, argumentative per nursing report.  UA culture and sensitivity is

pending.  Mood has been labile.  Meds have to be hidden in her food and then

also she is noncompliant.



REVIEW OF SYSTEMS:  Ambulation impaired, in wheelchair.  No CV, , pulmonary,

eye system symptoms on review.



MENTAL STATUS EXAM:  Oriented to herself and situation.  Speech is coherent,

rapid.  Abstraction fair, computation impaired, language function intact,

attention span short.  Mood and affect remains labile.



LABORATORY DATA:  Reviewed.



IMPRESSION:  Major depressive disorder with psychotic features versus bipolar 1

disorder, mixed with psychotic features; major neurocognitive disorder,

Alzheimer, vascular with delusions.



PLAN:  Increase Seroquel from 37.5 mg 3 times a day to 50 mg 3 times a day,

continue, rest unchanged per initial note, may need to increase Luvox.





______________________________

NEGAR DANIEL MD



DR:  REMY/cherrie  JOB#:  [id number] / [id number]

DD:  04/24/2018 12:50  DT:  04/24/2018 22:59

## 2018-04-24 NOTE — PN
DATE:  04/21/2018



This is a late entry of 04/21/2018 covers elements not covered in my initial

note of 04/21/2018.



SUBJECTIVE:  I met with the patient in the evening of 04/21/2018.  This note is

being re-dictated as requested by medical records.  The patient did well in the

morning, impulsive, resistive in the afternoon, refused afternoon medications,

took them later hidden in juice, putting herself on the floor repeatedly after

supper.  We will check labs morning of 04/22/2018.



REVIEW OF SYSTEMS:  No CV, , pulmonary, eye system symptoms on review.  Gait

unsteady, in wheelchair.



MENTAL STATUS EXAM:  Oriented to herself and situation.  Speech moderate

latency, low in volume.  Abstraction fair, computation impaired, language

function intact.  Mood and affect somewhat withdrawn, psychotic, labile at

times.



LABORATORY DATA:  Reviewed.



IMPRESSION:  Unchanged from initial note.



PLAN:  Continue psychotropics and adjust further post-lab results.





______________________________

NEGAR DANIEL MD



DR:  REMY/cherrie  JOB#:  [id number] / [id number]

DD:  04/23/2018 12:27  DT:  04/24/2018 01:50

## 2018-04-24 NOTE — PN
DATE:  04/22/2018



PSYCHIATRIC PROGRESS NOTE



This is a late entry of 04/22/2018 covers elements not covered in my initial

note of 04/22/2018.



SUBJECTIVE:  I met with the patient in the afternoon.



This note is being redictated as requested by medical records.  The patient has

had some possible emesis, but behaviorally little more withdrawn, less agitated,

not putting herself on the floor, met with her in her room.



REVIEW OF SYSTEMS:  No CV, , pulmonary, eye system symptoms on review.  Gait

unsteady, in wheelchair.



MENTAL STATUS EXAM:  Oriented to herself and situation.  Speech moderate

latency, often responses monosyllabic.  Abstraction fair, computation impaired,

language function intact, attention span short.  Mood and affect remain somewhat

withdrawn at times.



LABORATORY DATA:  Reviewed.



IMPRESSION:  Unchanged from initial note.



PLAN:  Continue psychotropics mentioned in my initial note.





______________________________

MAN IRINEO DANIEL MD



DR:  REMY/cherrie  JOB#:  [id number] / [id number]

DD:  04/23/2018 12:28  DT:  04/24/2018 02:37

## 2018-04-25 VITALS — DIASTOLIC BLOOD PRESSURE: 61 MMHG | SYSTOLIC BLOOD PRESSURE: 135 MMHG

## 2018-04-25 VITALS — SYSTOLIC BLOOD PRESSURE: 162 MMHG | DIASTOLIC BLOOD PRESSURE: 74 MMHG

## 2018-04-25 VITALS — DIASTOLIC BLOOD PRESSURE: 76 MMHG | SYSTOLIC BLOOD PRESSURE: 157 MMHG

## 2018-04-25 RX ADMIN — LAMOTRIGINE SCH MG: 150 TABLET ORAL at 08:46

## 2018-04-25 RX ADMIN — Medication SCH CAP: at 19:22

## 2018-04-25 RX ADMIN — DONEPEZIL HYDROCHLORIDE SCH MG: 5 TABLET, FILM COATED ORAL at 19:22

## 2018-04-25 RX ADMIN — ONDANSETRON PRN MG: 4 TABLET, ORALLY DISINTEGRATING ORAL at 11:26

## 2018-04-25 RX ADMIN — MINOXIDIL SCH MG: 2.5 TABLET ORAL at 08:46

## 2018-04-25 RX ADMIN — ACETAMINOPHEN PRN MG: 325 TABLET, FILM COATED ORAL at 07:56

## 2018-04-25 RX ADMIN — PANTOPRAZOLE SODIUM SCH MG: 40 TABLET, DELAYED RELEASE ORAL at 08:46

## 2018-04-25 RX ADMIN — NICOTINE SCH PATCH: 14 PATCH, EXTENDED RELEASE TOPICAL at 08:47

## 2018-04-25 RX ADMIN — HYOSCYAMINE SULFATE PRN MG: 0.12 TABLET, ORALLY DISINTEGRATING ORAL; SUBLINGUAL at 14:13

## 2018-04-25 RX ADMIN — DICYCLOMINE HYDROCHLORIDE SCH MG: 10 CAPSULE ORAL at 08:46

## 2018-04-25 RX ADMIN — ACETAMINOPHEN PRN MG: 325 TABLET, FILM COATED ORAL at 18:15

## 2018-04-25 RX ADMIN — QUETIAPINE FUMARATE SCH MG: 50 TABLET, FILM COATED ORAL at 08:46

## 2018-04-25 RX ADMIN — DICYCLOMINE HYDROCHLORIDE SCH MG: 10 CAPSULE ORAL at 19:22

## 2018-04-25 RX ADMIN — Medication SCH CAP: at 08:45

## 2018-04-25 RX ADMIN — QUETIAPINE FUMARATE SCH MG: 50 TABLET, FILM COATED ORAL at 12:15

## 2018-04-25 RX ADMIN — LAMOTRIGINE SCH MG: 150 TABLET ORAL at 19:22

## 2018-04-25 RX ADMIN — ACETAMINOPHEN PRN MG: 325 TABLET, FILM COATED ORAL at 12:18

## 2018-04-25 RX ADMIN — HYOSCYAMINE SULFATE PRN MG: 0.12 TABLET, ORALLY DISINTEGRATING ORAL; SUBLINGUAL at 08:48

## 2018-04-25 RX ADMIN — QUETIAPINE FUMARATE SCH MG: 50 TABLET, FILM COATED ORAL at 17:00

## 2018-04-25 RX ADMIN — LOSARTAN POTASSIUM SCH MG: 50 TABLET, FILM COATED ORAL at 08:45

## 2018-04-25 RX ADMIN — FLUTICASONE PROPIONATE SCH SPRAY: 50 SPRAY, METERED NASAL at 09:00

## 2018-04-25 NOTE — PDOC
Exam


Note:


Sanchez Note:


Please also refer to the separate dictated note~for this date of service 

dictated separately.~Patient seen individually. Discussed the patient with 

Nursing staff reviewed the chart.~Reviewed interim history and current 

functioning. Reviewed vital signs,~Labs/ Radiology~and current medications 

noted below. Continue current treatment with the changes noted in the dictated 

addendum note





Assessment:


Vital Signs:





 Vital Signs








  Date Time  Temp Pulse Resp B/P (MAP) Pulse Ox O2 Delivery O2 Flow Rate FiO2


 


4/25/18 15:47 97.9 92 18 135/61 (85) 97   


 


4/25/18 06:34      Room Air  








I&O











Intake and Output 


 


 4/25/18





 07:00


 


Intake Total 720 ml


 


Output Total 2070 ml


 


Balance -1350 ml


 


 


 


Intake Oral 720 ml


 


Output Urine Total 2070 ml


 


# Bowel Movements 1











Current Medications:


Meds:





Current Medications


Acetaminophen (Tylenol) 650 mg PRN Q6HRS  PRN PO PAIN / TEMP Last administered 

on 4/24/18at 08:26;  Start 4/11/18 at 19:30;  Stop 4/24/18 at 14:04;  Status DC


Multi-Ingredient Ointment (Analgesic Balm) 1 logan PRN QID  PRN TP MUSCLE PAIN;  

Start 4/11/18 at 19:30


Al Hydroxide/Mg Hydroxide (Mylanta Plus Xs) 15 ml PRN AFTMEALHC  PRN PO 

DYSPEPSIA;  Start 4/11/18 at 19:30


Magnesium Hydroxide (Milk Of Magnesia) 2,400 mg PRN QHS  PRN PO CONSTIPATION;  

Start 4/11/18 at 19:30


Donepezil HCl (Aricept) 5 mg HS PO  Last administered on 4/25/18at 19:22;  

Start 4/11/18 at 23:00


Lorazepam (Ativan) 0.75 mg BID94 PO  Last administered on 4/17/18at 16:07;  

Start 4/12/18 at 09:00;  Stop 4/17/18 at 18:34;  Status DC


Lorazepam (Ativan) 1 mg HS PO  Last administered on 4/12/18at 20:04;  Start 4/11 /18 at 23:00;  Stop 4/13/18 at 18:25;  Status DC


Olanzapine (ZyPREXA) 5 mg PRN BID  PRN PO ANXIETY / AGITATION Last administered 

on 4/16/18at 13:32;  Start 4/11/18 at 22:00;  Stop 4/16/18 at 18:40;  Status DC


Dicyclomine HCl (Bentyl) 10 mg BID PO  Last administered on 4/25/18at 19:22;  

Start 4/11/18 at 23:00


Fluticasone Propionate (Flonase) 2 spray DAILY NS  Last administered on 4/20/ 18at 08:58;  Start 4/12/18 at 09:00


Albuterol/ Ipratropium (Duoneb) 3 ml PRN Q4HRS  PRN NEB SHORTNESS OF BREATH;  

Start 4/11/18 at 22:00


Lamotrigine (LaMICtal) 150 mg BID PO  Last administered on 4/25/18at 19:22;  

Start 4/11/18 at 23:00


Losartan Potassium (Cozaar) 50 mg DAILY PO  Last administered on 4/25/18at 08:45

;  Start 4/12/18 at 09:00


Minoxidil (Loniten) 10 mg DAILY PO  Last administered on 4/25/18at 08:46;  

Start 4/12/18 at 09:00


Nicotine (Nicoderm Cq 14mg) 1 patch DAILY TD  Last administered on 4/22/18at 08:

16;  Start 4/12/18 at 09:00;  Stop 4/25/18 at 09:03;  Status DC


Oxcarbazepine (Trileptal) 600 mg BID PO  Last administered on 4/25/18at 19:22;  

Start 4/12/18 at 09:00


Pantoprazole Sodium (Protonix) 40 mg DAILY PO  Last administered on 4/25/18at 08

:46;  Start 4/12/18 at 09:00


Trimethoprim/ Sulfamethoxazole (Bactrim Ds) 1 tab BID PO  Last administered on 4 /13/18at 09:00;  Start 4/11/18 at 23:00;  Stop 4/13/18 at 16:19;  Status DC


Citalopram Hydrobromide (CeleXA) 20 mg DAILY PO  Last administered on 4/14/18at 

08:39;  Start 4/12/18 at 09:00;  Stop 4/14/18 at 18:32;  Status DC


Lactobacillus Rhamnosus (Culturelle) 1 cap BID PO  Last administered on 4/25/ 18at 19:22;  Start 4/12/18 at 09:00


Amoxicillin (Amoxil) 500 mg FIF451 PO  Last administered on 4/19/18at 08:35;  

Start 4/13/18 at 21:00;  Stop 4/19/18 at 09:00;  Status DC


Ciprofloxacin (Cipro) 250 mg BID PO  Last administered on 4/19/18at 08:35;  

Start 4/13/18 at 21:00;  Stop 4/19/18 at 09:00;  Status DC


Lactobacillus Rhamnosus (Culturelle) 1 cap BID PO ;  Start 4/13/18 at 21:00;  

Stop 4/13/18 at 21:00;  Status DC


Lorazepam (Ativan) 0.75 mg HS PO  Last administered on 4/23/18at 19:27;  Start 4 /14/18 at 21:00;  Stop 4/24/18 at 19:26;  Status DC


Lorazepam (Ativan) 1 mg QHS PO  Last administered on 4/13/18at 19:42;  Start 4/ 13/18 at 21:00;  Stop 4/14/18 at 20:30;  Status DC


Fluvoxamine Maleate (Luvox) 25 mg QHS PO  Last administered on 4/16/18at 20:01;

  Start 4/14/18 at 21:00;  Stop 4/16/18 at 23:00;  Status DC


Fluvoxamine Maleate (Luvox) 50 mg HS PO  Last administered on 4/25/18at 19:22;  

Start 4/17/18 at 21:00


Olanzapine (ZyPREXA ZYDIS) 2.5 mg PRN Q2HR  PRN PO PSYCHOSIS Last administered 

on 4/25/18at 18:15;  Start 4/16/18 at 18:45


Lorazepam (Ativan) 0.5 mg DAILY PO  Last administered on 4/25/18at 08:48;  

Start 4/18/18 at 09:00


Lorazepam (Ativan) 0.75 mg 1600 PO  Last administered on 4/25/18at 17:00;  

Start 4/18/18 at 16:00


Quetiapine Fumarate (SEROquel) 12.5 mg 0900,1300,1700 PO  Last administered on 4 /19/18at 16:22;  Start 4/19/18 at 09:00;  Stop 4/19/18 at 18:23;  Status DC


Quetiapine Fumarate (SEROquel) 25 mg 0900,1300,1700 PO  Last administered on 4/ 20/18at 16:52;  Start 4/20/18 at 09:00;  Stop 4/20/18 at 18:14;  Status DC


Vitamin D (Vitamin D3) 50,000 unit WEEKLY PO  Last administered on 4/20/18at 09:

15;  Start 4/20/18 at 09:00


Quetiapine Fumarate (SEROquel) 37.5 mg 0900,1300,1700 PO  Last administered on 4 /23/18at 16:54;  Start 4/21/18 at 09:00;  Stop 4/23/18 at 18:12;  Status DC


Ondansetron HCl (Zofran Odt) 4 mg PRN Q8HRS  PRN PO NAUSEA/VOMITING Last 

administered on 4/25/18at 11:26;  Start 4/22/18 at 15:15


Quetiapine Fumarate (SEROquel) 50 mg 0900,1300,1700 PO  Last administered on 4/ 25/18at 17:00;  Start 4/24/18 at 09:00


Acetaminophen (Tylenol) 650 mg PRN Q4HRS  PRN PO PAIN / TEMP Last administered 

on 4/25/18at 18:15;  Start 4/24/18 at 14:15


Hyoscyamine (Anaspaz) 0.125 mg PRN Q4HRS  PRN PO STOMACH CRAMPING Last 

administered on 4/25/18at 14:13;  Start 4/24/18 at 14:15


Lorazepam (Ativan) 0.5 mg HS PO  Last administered on 4/25/18at 19:24;  Start 4/ 25/18 at 21:00


Nicotine (Nicoderm Cq 14mg) 1 patch PRN DAILY  PRN TD smoking cessation;  Start 

4/26/18 at 09:00





Active Scripts


Active


Reported


Bactrim Ds Tablet (Sulfamethoxazole/Trimethoprim) 1 Each Tablet 1 Each PO BID


Pantoprazole Sodium 40 Mg Tablet.dr 40 Mg PO DAILY


Trileptal (Oxcarbazepine) 300 Mg Tablet 600 Mg PO BID


Zyprexa (Olanzapine) 5 Mg Tablet 5 Mg PO PRN BID PRN


NICODERM CQ 14mg (Nicotine) 1 Each Patch.td24 1 Patch TD DAILY


Minoxidil 2.5 Mg Tablet 10 Mg PO DAILY


Cozaar (Losartan Potassium) 50 Mg Tablet 50 Mg PO DAILY


Lorazepam 1 Mg Tablet 1 Mg PO HS


Lorazepam 0.5 Mg Tablet 0.75 Mg PO BID94


Lamictal (Lamotrigine) 150 Mg Tablet 150 Mg PO BID


Probiotic (Lactobacillus Acidophilus) 1 Each Capsule 1 Each PO TIDWMEALS


Duoneb 0.5-3(2.5) Mg/3 Ml (Albuterol/Ipratropium) 3 Ml Ampul.neb 3 Ml NEB PRN 

Q4HRS PRN


Fluticasone Propionate Nasal Spray (Fluticasone Propionate) 16 Gm Spray.susp 2 

[place] NS DAILY


Escitalopram Oxalate 10 Mg Tablet 10 Mg PO DAILY


Donepezil Hcl 5 Mg Tablet 5 Mg PO HS


Dicyclomine Hcl 10 Mg Capsule 10 Mg PO BID


I have reviewed the current psychotropics carefully including drug 

interactions.  Risk benefit ratio favors no change other than as noted in my 

dictated progress note.





Diagnosis:


Problems:  


(1) Anxiety disorder


(2) Impulse control disorder


(3) Psychotic depression


(4) Major depressive disorder, recurrent episode


(5) Dementia, vascular, with depression


(6) Dementia, vascular, with delusions


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











NEGAR DANIEL MD Apr 25, 2018 20:26

## 2018-04-25 NOTE — PN
DATE:  04/24/2018



This is a late entry, 04/24/2018, covers the elements not covered in my initial

note, 04/24/2018.



SUBJECTIVE:  I met with the patient in the evening.  The patient slept 6-1/2

hours.  She is doing a little better, compliant with medications.  Complains of

pain in the suprapubic area.  She previously pulled out the suprapubic catheter,

which was replaced by nursing staff.  Tylenol has been increased.



REVIEW OF SYSTEMS:  Ambulation impaired.  No CV, , pulmonary, eye system

symptoms on review.



MENTAL STATUS EXAM:  Oriented to herself and situation.  Speech coherent, has

some latency.  Abstraction fair, computation impaired, language function intact.

 Mood and affect less labile on 04/24/2018.



LABORATORY DATA:  Reviewed.



IMPRESSION:  Major depressive disorder with psychotic features; major

neurocognitive disorder, Alzheimer, vascular with delusion, depression.



PLAN:  Ativan is 0.5 mg before noon, 0.75 mg two times a day, and we will reduce

it to 0.5 mg twice a day, 0.75 mg once a day.  Continue rest unchanged.





______________________________

MAN IRINEO DANIEL MD



DR:  REMY/cherrie  JOB#:  [id number] / [id number]

DD:  04/25/2018 12:11  DT:  04/25/2018 22:11

## 2018-04-26 VITALS — DIASTOLIC BLOOD PRESSURE: 60 MMHG | SYSTOLIC BLOOD PRESSURE: 130 MMHG

## 2018-04-26 VITALS — DIASTOLIC BLOOD PRESSURE: 84 MMHG | SYSTOLIC BLOOD PRESSURE: 175 MMHG

## 2018-04-26 RX ADMIN — QUETIAPINE FUMARATE SCH MG: 50 TABLET, FILM COATED ORAL at 12:23

## 2018-04-26 RX ADMIN — Medication SCH CAP: at 21:00

## 2018-04-26 RX ADMIN — QUETIAPINE FUMARATE SCH MG: 50 TABLET, FILM COATED ORAL at 13:40

## 2018-04-26 RX ADMIN — QUETIAPINE FUMARATE SCH MG: 50 TABLET, FILM COATED ORAL at 16:34

## 2018-04-26 RX ADMIN — PANTOPRAZOLE SODIUM SCH MG: 40 TABLET, DELAYED RELEASE ORAL at 07:48

## 2018-04-26 RX ADMIN — MINOXIDIL SCH MG: 2.5 TABLET ORAL at 07:51

## 2018-04-26 RX ADMIN — FLUTICASONE PROPIONATE SCH SPRAY: 50 SPRAY, METERED NASAL at 07:51

## 2018-04-26 RX ADMIN — ACETAMINOPHEN PRN MG: 325 TABLET, FILM COATED ORAL at 21:06

## 2018-04-26 RX ADMIN — LAMOTRIGINE SCH MG: 150 TABLET ORAL at 07:48

## 2018-04-26 RX ADMIN — Medication SCH CAP: at 08:12

## 2018-04-26 RX ADMIN — DICYCLOMINE HYDROCHLORIDE SCH MG: 10 CAPSULE ORAL at 19:51

## 2018-04-26 RX ADMIN — Medication SCH CAP: at 07:48

## 2018-04-26 RX ADMIN — DICYCLOMINE HYDROCHLORIDE SCH MG: 10 CAPSULE ORAL at 07:48

## 2018-04-26 RX ADMIN — ACETAMINOPHEN PRN MG: 325 TABLET, FILM COATED ORAL at 12:51

## 2018-04-26 RX ADMIN — DONEPEZIL HYDROCHLORIDE SCH MG: 5 TABLET, FILM COATED ORAL at 19:51

## 2018-04-26 RX ADMIN — ACETAMINOPHEN PRN MG: 325 TABLET, FILM COATED ORAL at 07:50

## 2018-04-26 RX ADMIN — Medication SCH CAP: at 19:51

## 2018-04-26 RX ADMIN — LAMOTRIGINE SCH MG: 150 TABLET ORAL at 19:51

## 2018-04-26 RX ADMIN — QUETIAPINE FUMARATE SCH MG: 50 TABLET, FILM COATED ORAL at 07:47

## 2018-04-26 RX ADMIN — Medication SCH CAP: at 20:02

## 2018-04-26 RX ADMIN — FLUTICASONE PROPIONATE SCH SPRAY: 50 SPRAY, METERED NASAL at 08:11

## 2018-04-26 RX ADMIN — LOSARTAN POTASSIUM SCH MG: 50 TABLET, FILM COATED ORAL at 07:47

## 2018-04-26 NOTE — PDOC
Exam


Note:


Sanchez Note:


Please also refer to the separate dictated note~for this date of service 

dictated separately.~Patient seen individually. Discussed the patient with 

Nursing staff reviewed the chart.~Reviewed interim history and current 

functioning. Reviewed vital signs,~Labs/ Radiology~and current medications 

noted below. Continue current treatment with the changes noted in the dictated 

addendum note





Assessment:


Vital Signs:





 Vital Signs








  Date Time  Temp Pulse Resp B/P (MAP) Pulse Ox O2 Delivery O2 Flow Rate FiO2


 


4/26/18 16:21 97.8 80 20 130/60 (83) 95   


 


4/25/18 06:34      Room Air  








I&O











Intake and Output 


 


 4/26/18





 07:00


 


Intake Total 1200 ml


 


Output Total 1975 ml


 


Balance -775 ml


 


 


 


Intake Oral 1200 ml


 


Output Urine Total 1975 ml


 


# Bowel Movements 1











Current Medications:


Meds:





Current Medications


Acetaminophen (Tylenol) 650 mg PRN Q6HRS  PRN PO PAIN / TEMP Last administered 

on 4/24/18at 08:26;  Start 4/11/18 at 19:30;  Stop 4/24/18 at 14:04;  Status DC


Multi-Ingredient Ointment (Analgesic Balm) 1 logan PRN QID  PRN TP MUSCLE PAIN;  

Start 4/11/18 at 19:30


Al Hydroxide/Mg Hydroxide (Mylanta Plus Xs) 15 ml PRN AFTMEALHC  PRN PO 

DYSPEPSIA;  Start 4/11/18 at 19:30


Magnesium Hydroxide (Milk Of Magnesia) 2,400 mg PRN QHS  PRN PO CONSTIPATION;  

Start 4/11/18 at 19:30


Donepezil HCl (Aricept) 5 mg HS PO  Last administered on 4/26/18at 19:51;  

Start 4/11/18 at 23:00


Lorazepam (Ativan) 0.75 mg BID94 PO  Last administered on 4/17/18at 16:07;  

Start 4/12/18 at 09:00;  Stop 4/17/18 at 18:34;  Status DC


Lorazepam (Ativan) 1 mg HS PO  Last administered on 4/12/18at 20:04;  Start 4/11 /18 at 23:00;  Stop 4/13/18 at 18:25;  Status DC


Olanzapine (ZyPREXA) 5 mg PRN BID  PRN PO ANXIETY / AGITATION Last administered 

on 4/16/18at 13:32;  Start 4/11/18 at 22:00;  Stop 4/16/18 at 18:40;  Status DC


Dicyclomine HCl (Bentyl) 10 mg BID PO  Last administered on 4/26/18at 19:51;  

Start 4/11/18 at 23:00


Fluticasone Propionate (Flonase) 2 spray DAILY NS  Last administered on 4/20/ 18at 08:58;  Start 4/12/18 at 09:00


Albuterol/ Ipratropium (Duoneb) 3 ml PRN Q4HRS  PRN NEB SHORTNESS OF BREATH;  

Start 4/11/18 at 22:00


Lamotrigine (LaMICtal) 150 mg BID PO  Last administered on 4/26/18at 19:51;  

Start 4/11/18 at 23:00


Losartan Potassium (Cozaar) 50 mg DAILY PO  Last administered on 4/26/18at 07:47

;  Start 4/12/18 at 09:00


Minoxidil (Loniten) 10 mg DAILY PO  Last administered on 4/26/18at 07:51;  

Start 4/12/18 at 09:00


Nicotine (Nicoderm Cq 14mg) 1 patch DAILY TD  Last administered on 4/22/18at 08:

16;  Start 4/12/18 at 09:00;  Stop 4/25/18 at 09:03;  Status DC


Oxcarbazepine (Trileptal) 600 mg BID PO  Last administered on 4/26/18at 07:48;  

Start 4/12/18 at 09:00;  Stop 4/26/18 at 19:36;  Status DC


Pantoprazole Sodium (Protonix) 40 mg DAILY PO  Last administered on 4/26/18at 07

:48;  Start 4/12/18 at 09:00


Trimethoprim/ Sulfamethoxazole (Bactrim Ds) 1 tab BID PO  Last administered on 4 /13/18at 09:00;  Start 4/11/18 at 23:00;  Stop 4/13/18 at 16:19;  Status DC


Citalopram Hydrobromide (CeleXA) 20 mg DAILY PO  Last administered on 4/14/18at 

08:39;  Start 4/12/18 at 09:00;  Stop 4/14/18 at 18:32;  Status DC


Lactobacillus Rhamnosus (Culturelle) 1 cap BID PO  Last administered on 4/25/ 18at 19:22;  Start 4/12/18 at 09:00


Amoxicillin (Amoxil) 500 mg MIO462 PO  Last administered on 4/19/18at 08:35;  

Start 4/13/18 at 21:00;  Stop 4/19/18 at 09:00;  Status DC


Ciprofloxacin (Cipro) 250 mg BID PO  Last administered on 4/19/18at 08:35;  

Start 4/13/18 at 21:00;  Stop 4/19/18 at 09:00;  Status DC


Lactobacillus Rhamnosus (Culturelle) 1 cap BID PO ;  Start 4/13/18 at 21:00;  

Stop 4/13/18 at 21:00;  Status DC


Lorazepam (Ativan) 0.75 mg HS PO  Last administered on 4/23/18at 19:27;  Start 4 /14/18 at 21:00;  Stop 4/24/18 at 19:26;  Status DC


Lorazepam (Ativan) 1 mg QHS PO  Last administered on 4/13/18at 19:42;  Start 4/ 13/18 at 21:00;  Stop 4/14/18 at 20:30;  Status DC


Fluvoxamine Maleate (Luvox) 25 mg QHS PO  Last administered on 4/16/18at 20:01;

  Start 4/14/18 at 21:00;  Stop 4/16/18 at 23:00;  Status DC


Fluvoxamine Maleate (Luvox) 50 mg HS PO  Last administered on 4/26/18at 19:51;  

Start 4/17/18 at 21:00


Olanzapine (ZyPREXA ZYDIS) 2.5 mg PRN Q2HR  PRN PO PSYCHOSIS Last administered 

on 4/26/18at 12:50;  Start 4/16/18 at 18:45


Lorazepam (Ativan) 0.5 mg DAILY PO  Last administered on 4/26/18at 07:50;  

Start 4/18/18 at 09:00;  Stop 4/26/18 at 12:26;  Status DC


Lorazepam (Ativan) 0.75 mg 1600 PO  Last administered on 4/25/18at 17:00;  

Start 4/18/18 at 16:00;  Stop 4/26/18 at 12:26;  Status DC


Quetiapine Fumarate (SEROquel) 12.5 mg 0900,1300,1700 PO  Last administered on 4 /19/18at 16:22;  Start 4/19/18 at 09:00;  Stop 4/19/18 at 18:23;  Status DC


Quetiapine Fumarate (SEROquel) 25 mg 0900,1300,1700 PO  Last administered on 4/ 20/18at 16:52;  Start 4/20/18 at 09:00;  Stop 4/20/18 at 18:14;  Status DC


Vitamin D (Vitamin D3) 50,000 unit WEEKLY PO  Last administered on 4/20/18at 09:

15;  Start 4/20/18 at 09:00


Quetiapine Fumarate (SEROquel) 37.5 mg 0900,1300,1700 PO  Last administered on 4 /23/18at 16:54;  Start 4/21/18 at 09:00;  Stop 4/23/18 at 18:12;  Status DC


Ondansetron HCl (Zofran Odt) 4 mg PRN Q8HRS  PRN PO NAUSEA/VOMITING Last 

administered on 4/25/18at 11:26;  Start 4/22/18 at 15:15


Quetiapine Fumarate (SEROquel) 50 mg 0900,1300,1700 PO  Last administered on 4/ 26/18at 16:34;  Start 4/24/18 at 09:00


Acetaminophen (Tylenol) 650 mg PRN Q4HRS  PRN PO PAIN / TEMP Last administered 

on 4/26/18at 21:06;  Start 4/24/18 at 14:15


Hyoscyamine (Anaspaz) 0.125 mg PRN Q4HRS  PRN PO STOMACH CRAMPING Last 

administered on 4/25/18at 14:13;  Start 4/24/18 at 14:15


Lorazepam (Ativan) 0.5 mg HS PO  Last administered on 4/25/18at 19:24;  Start 4/ 25/18 at 21:00;  Stop 4/26/18 at 12:31;  Status DC


Nicotine (Nicoderm Cq 14mg) 1 patch PRN DAILY  PRN TD smoking cessation;  Start 

4/26/18 at 09:00


Lorazepam (Ativan) 0.25 mg DAILY PO ;  Start 4/28/18 at 09:00


Lorazepam (Ativan) 0.5 mg 1600 PO  Last administered on 4/26/18at 16:34;  Start 

4/26/18 at 16:00


Lorazepam (Ativan) 0.25 mg HS PO ;  Start 5/1/18 at 21:00


Oxcarbazepine (Trileptal) 600 mg DAILY PO ;  Start 4/27/18 at 09:00


Oxcarbazepine (Trileptal) 450 mg QHS PO  Last administered on 4/26/18at 19:51;  

Start 4/26/18 at 21:00





Active Scripts


Active


Reported


Bactrim Ds Tablet (Sulfamethoxazole/Trimethoprim) 1 Each Tablet 1 Each PO BID


Pantoprazole Sodium 40 Mg Tablet.dr 40 Mg PO DAILY


Trileptal (Oxcarbazepine) 300 Mg Tablet 600 Mg PO BID


Zyprexa (Olanzapine) 5 Mg Tablet 5 Mg PO PRN BID PRN


NICODERM CQ 14mg (Nicotine) 1 Each Patch.td24 1 Patch TD DAILY


Minoxidil 2.5 Mg Tablet 10 Mg PO DAILY


Cozaar (Losartan Potassium) 50 Mg Tablet 50 Mg PO DAILY


Lorazepam 1 Mg Tablet 1 Mg PO HS


Lorazepam 0.5 Mg Tablet 0.75 Mg PO BID94


Lamictal (Lamotrigine) 150 Mg Tablet 150 Mg PO BID


Probiotic (Lactobacillus Acidophilus) 1 Each Capsule 1 Each PO TIDWMEALS


Duoneb 0.5-3(2.5) Mg/3 Ml (Albuterol/Ipratropium) 3 Ml Ampul.neb 3 Ml NEB PRN 

Q4HRS PRN


Fluticasone Propionate Nasal Spray (Fluticasone Propionate) 16 Gm Spray.susp 2 

[place] NS DAILY


Escitalopram Oxalate 10 Mg Tablet 10 Mg PO DAILY


Donepezil Hcl 5 Mg Tablet 5 Mg PO HS


Dicyclomine Hcl 10 Mg Capsule 10 Mg PO BID


I have reviewed the current psychotropics carefully including drug 

interactions.  Risk benefit ratio favors no change other than as noted in my 

dictated progress note.





Diagnosis:


Problems:  


(1) Anxiety disorder


(2) Impulse control disorder


(3) Psychotic depression


(4) Major depressive disorder, recurrent episode


(5) Dementia, vascular, with depression


(6) Dementia, vascular, with delusions


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











NEGAR DANIEL MD Apr 26, 2018 21:10

## 2018-04-27 VITALS — SYSTOLIC BLOOD PRESSURE: 110 MMHG | DIASTOLIC BLOOD PRESSURE: 71 MMHG

## 2018-04-27 VITALS — DIASTOLIC BLOOD PRESSURE: 55 MMHG | SYSTOLIC BLOOD PRESSURE: 159 MMHG

## 2018-04-27 VITALS — DIASTOLIC BLOOD PRESSURE: 86 MMHG | SYSTOLIC BLOOD PRESSURE: 197 MMHG

## 2018-04-27 RX ADMIN — ACETAMINOPHEN PRN MG: 325 TABLET, FILM COATED ORAL at 02:08

## 2018-04-27 RX ADMIN — MINOXIDIL SCH MG: 2.5 TABLET ORAL at 08:37

## 2018-04-27 RX ADMIN — QUETIAPINE FUMARATE SCH MG: 50 TABLET, FILM COATED ORAL at 08:36

## 2018-04-27 RX ADMIN — ACETAMINOPHEN PRN MG: 325 TABLET, FILM COATED ORAL at 16:12

## 2018-04-27 RX ADMIN — QUETIAPINE FUMARATE SCH MG: 50 TABLET, FILM COATED ORAL at 13:49

## 2018-04-27 RX ADMIN — Medication SCH CAP: at 20:05

## 2018-04-27 RX ADMIN — DICYCLOMINE HYDROCHLORIDE SCH MG: 10 CAPSULE ORAL at 20:05

## 2018-04-27 RX ADMIN — QUETIAPINE FUMARATE SCH MG: 50 TABLET, FILM COATED ORAL at 16:13

## 2018-04-27 RX ADMIN — DONEPEZIL HYDROCHLORIDE SCH MG: 5 TABLET, FILM COATED ORAL at 20:05

## 2018-04-27 RX ADMIN — ACETAMINOPHEN PRN MG: 325 TABLET, FILM COATED ORAL at 08:39

## 2018-04-27 RX ADMIN — DICYCLOMINE HYDROCHLORIDE SCH MG: 10 CAPSULE ORAL at 08:36

## 2018-04-27 RX ADMIN — CHOLECALCIFEROL CAP 1.25 MG (50000 UNIT) SCH UNIT: 1.25 CAP at 08:39

## 2018-04-27 RX ADMIN — LAMOTRIGINE SCH MG: 150 TABLET ORAL at 08:36

## 2018-04-27 RX ADMIN — CHOLECALCIFEROL CAP 1.25 MG (50000 UNIT) SCH UNIT: 1.25 CAP at 09:00

## 2018-04-27 RX ADMIN — FLUTICASONE PROPIONATE SCH SPRAY: 50 SPRAY, METERED NASAL at 08:36

## 2018-04-27 RX ADMIN — LOSARTAN POTASSIUM SCH MG: 50 TABLET, FILM COATED ORAL at 08:36

## 2018-04-27 RX ADMIN — PANTOPRAZOLE SODIUM SCH MG: 40 TABLET, DELAYED RELEASE ORAL at 08:36

## 2018-04-27 RX ADMIN — LAMOTRIGINE SCH MG: 150 TABLET ORAL at 20:05

## 2018-04-27 RX ADMIN — Medication SCH CAP: at 08:36

## 2018-04-27 NOTE — PDOC
Exam


Note:


Sanchez Note:


Please also refer to the separate dictated note~for this date of service 

dictated separately.~Patient seen individually. Discussed the patient with 

Nursing staff reviewed the chart.~Reviewed interim history and current 

functioning. Reviewed vital signs,~Labs/ Radiology~and current medications 

noted below. Continue current treatment with the changes noted in the dictated 

addendum note





Assessment:


Vital Signs:





 Vital Signs








  Date Time  Temp Pulse Resp B/P (MAP) Pulse Ox O2 Delivery O2 Flow Rate FiO2


 


4/27/18 16:25 98.6 88 16 110/71 (84) 98   


 


4/25/18 06:34      Room Air  








I&O











Intake and Output 


 


 4/27/18





 07:00


 


Intake Total 1020 ml


 


Output Total 1050 ml


 


Balance -30 ml


 


 


 


Intake Oral 1020 ml


 


Output Urine Total 1050 ml











Current Medications:


Meds:





Current Medications


Acetaminophen (Tylenol) 650 mg PRN Q6HRS  PRN PO PAIN / TEMP Last administered 

on 4/24/18at 08:26;  Start 4/11/18 at 19:30;  Stop 4/24/18 at 14:04;  Status DC


Multi-Ingredient Ointment (Analgesic Balm) 1 logan PRN QID  PRN TP MUSCLE PAIN 

Last administered on 4/27/18at 02:09;  Start 4/11/18 at 19:30


Al Hydroxide/Mg Hydroxide (Mylanta Plus Xs) 15 ml PRN AFTMEALHC  PRN PO 

DYSPEPSIA;  Start 4/11/18 at 19:30


Magnesium Hydroxide (Milk Of Magnesia) 2,400 mg PRN QHS  PRN PO CONSTIPATION;  

Start 4/11/18 at 19:30


Donepezil HCl (Aricept) 5 mg HS PO  Last administered on 4/27/18at 20:05;  

Start 4/11/18 at 23:00


Lorazepam (Ativan) 0.75 mg BID94 PO  Last administered on 4/17/18at 16:07;  

Start 4/12/18 at 09:00;  Stop 4/17/18 at 18:34;  Status DC


Lorazepam (Ativan) 1 mg HS PO  Last administered on 4/12/18at 20:04;  Start 4/11 /18 at 23:00;  Stop 4/13/18 at 18:25;  Status DC


Olanzapine (ZyPREXA) 5 mg PRN BID  PRN PO ANXIETY / AGITATION Last administered 

on 4/16/18at 13:32;  Start 4/11/18 at 22:00;  Stop 4/16/18 at 18:40;  Status DC


Dicyclomine HCl (Bentyl) 10 mg BID PO  Last administered on 4/27/18at 20:05;  

Start 4/11/18 at 23:00


Fluticasone Propionate (Flonase) 2 spray DAILY NS  Last administered on 4/27/ 18at 08:36;  Start 4/12/18 at 09:00


Albuterol/ Ipratropium (Duoneb) 3 ml PRN Q4HRS  PRN NEB SHORTNESS OF BREATH;  

Start 4/11/18 at 22:00


Lamotrigine (LaMICtal) 150 mg BID PO  Last administered on 4/27/18at 20:05;  

Start 4/11/18 at 23:00


Losartan Potassium (Cozaar) 50 mg DAILY PO  Last administered on 4/27/18at 08:36

;  Start 4/12/18 at 09:00


Minoxidil (Loniten) 10 mg DAILY PO  Last administered on 4/27/18at 08:37;  

Start 4/12/18 at 09:00


Nicotine (Nicoderm Cq 14mg) 1 patch DAILY TD  Last administered on 4/22/18at 08:

16;  Start 4/12/18 at 09:00;  Stop 4/25/18 at 09:03;  Status DC


Oxcarbazepine (Trileptal) 600 mg BID PO  Last administered on 4/26/18at 07:48;  

Start 4/12/18 at 09:00;  Stop 4/26/18 at 19:36;  Status DC


Pantoprazole Sodium (Protonix) 40 mg DAILY PO  Last administered on 4/27/18at 08

:36;  Start 4/12/18 at 09:00


Trimethoprim/ Sulfamethoxazole (Bactrim Ds) 1 tab BID PO  Last administered on 4 /13/18at 09:00;  Start 4/11/18 at 23:00;  Stop 4/13/18 at 16:19;  Status DC


Citalopram Hydrobromide (CeleXA) 20 mg DAILY PO  Last administered on 4/14/18at 

08:39;  Start 4/12/18 at 09:00;  Stop 4/14/18 at 18:32;  Status DC


Lactobacillus Rhamnosus (Culturelle) 1 cap BID PO  Last administered on 4/27/ 18at 20:05;  Start 4/12/18 at 09:00


Amoxicillin (Amoxil) 500 mg [id number] PO  Last administered on 4/19/18at 08:35;  

Start 4/13/18 at 21:00;  Stop 4/19/18 at 09:00;  Status DC


Ciprofloxacin (Cipro) 250 mg BID PO  Last administered on 4/19/18at 08:35;  

Start 4/13/18 at 21:00;  Stop 4/19/18 at 09:00;  Status DC


Lactobacillus Rhamnosus (Culturelle) 1 cap BID PO ;  Start 4/13/18 at 21:00;  

Stop 4/13/18 at 21:00;  Status DC


Lorazepam (Ativan) 0.75 mg HS PO  Last administered on 4/23/18at 19:27;  Start 4 /14/18 at 21:00;  Stop 4/24/18 at 19:26;  Status DC


Lorazepam (Ativan) 1 mg QHS PO  Last administered on 4/13/18at 19:42;  Start 4/ 13/18 at 21:00;  Stop 4/14/18 at 20:30;  Status DC


Fluvoxamine Maleate (Luvox) 25 mg QHS PO  Last administered on 4/16/18at 20:01;

  Start 4/14/18 at 21:00;  Stop 4/16/18 at 23:00;  Status DC


Fluvoxamine Maleate (Luvox) 50 mg HS PO  Last administered on 4/27/18at 20:05;  

Start 4/17/18 at 21:00


Olanzapine (ZyPREXA ZYDIS) 2.5 mg PRN Q2HR  PRN PO PSYCHOSIS Last administered 

on 4/26/18at 12:50;  Start 4/16/18 at 18:45


Lorazepam (Ativan) 0.5 mg DAILY PO  Last administered on 4/26/18at 07:50;  

Start 4/18/18 at 09:00;  Stop 4/26/18 at 12:26;  Status DC


Lorazepam (Ativan) 0.75 mg 1600 PO  Last administered on 4/25/18at 17:00;  

Start 4/18/18 at 16:00;  Stop 4/26/18 at 12:26;  Status DC


Quetiapine Fumarate (SEROquel) 12.5 mg 0900,1300,1700 PO  Last administered on 4 /19/18at 16:22;  Start 4/19/18 at 09:00;  Stop 4/19/18 at 18:23;  Status DC


Quetiapine Fumarate (SEROquel) 25 mg 0900,1300,1700 PO  Last administered on 4/ 20/18at 16:52;  Start 4/20/18 at 09:00;  Stop 4/20/18 at 18:14;  Status DC


Vitamin D (Vitamin D3) 50,000 unit WEEKLY PO  Last administered on 4/20/18at 09:

15;  Start 4/20/18 at 09:00


Quetiapine Fumarate (SEROquel) 37.5 mg 0900,1300,1700 PO  Last administered on 4 /23/18at 16:54;  Start 4/21/18 at 09:00;  Stop 4/23/18 at 18:12;  Status DC


Ondansetron HCl (Zofran Odt) 4 mg PRN Q8HRS  PRN PO NAUSEA/VOMITING Last 

administered on 4/25/18at 11:26;  Start 4/22/18 at 15:15


Quetiapine Fumarate (SEROquel) 50 mg 0900,1300,1700 PO  Last administered on 4/ 27/18at 16:13;  Start 4/24/18 at 09:00


Acetaminophen (Tylenol) 650 mg PRN Q4HRS  PRN PO PAIN / TEMP Last administered 

on 4/27/18at 16:12;  Start 4/24/18 at 14:15


Hyoscyamine (Anaspaz) 0.125 mg PRN Q4HRS  PRN PO STOMACH CRAMPING Last 

administered on 4/25/18at 14:13;  Start 4/24/18 at 14:15


Lorazepam (Ativan) 0.5 mg HS PO  Last administered on 4/25/18at 19:24;  Start 4/ 25/18 at 21:00;  Stop 4/26/18 at 12:31;  Status DC


Nicotine (Nicoderm Cq 14mg) 1 patch PRN DAILY  PRN TD smoking cessation;  Start 

4/26/18 at 09:00


Lorazepam (Ativan) 0.25 mg DAILY PO ;  Start 4/28/18 at 09:00;  Stop 4/28/18 at 

09:00;  Status DC


Lorazepam (Ativan) 0.5 mg 1600 PO  Last administered on 4/26/18at 16:34;  Start 

4/26/18 at 16:00;  Stop 4/27/18 at 19:43;  Status DC


Lorazepam (Ativan) 0.25 mg HS PO ;  Start 5/1/18 at 21:00;  Stop 5/1/18 at 21:00

;  Status DC


Oxcarbazepine (Trileptal) 600 mg DAILY PO  Last administered on 4/27/18at 08:39

;  Start 4/27/18 at 09:00;  Stop 4/27/18 at 19:44;  Status DC


Oxcarbazepine (Trileptal) 450 mg QHS PO  Last administered on 4/26/18at 19:51;  

Start 4/26/18 at 21:00;  Stop 4/27/18 at 19:44;  Status DC


Lorazepam (Ativan) 0.5 mg 0900,1600,2100 PO  Last administered on 4/27/18at 20:

07;  Start 4/27/18 at 21:00


Oxcarbazepine (Trileptal) 600 mg BID PO  Last administered on 4/27/18at 20:05;  

Start 4/27/18 at 21:00





Active Scripts


Active


Reported


Bactrim Ds Tablet (Sulfamethoxazole/Trimethoprim) 1 Each Tablet 1 Each PO BID


Pantoprazole Sodium 40 Mg Tablet.dr 40 Mg PO DAILY


Trileptal (Oxcarbazepine) 300 Mg Tablet 600 Mg PO BID


Zyprexa (Olanzapine) 5 Mg Tablet 5 Mg PO PRN BID PRN


NICODERM CQ 14mg (Nicotine) 1 Each Patch.td24 1 Patch TD DAILY


Minoxidil 2.5 Mg Tablet 10 Mg PO DAILY


Cozaar (Losartan Potassium) 50 Mg Tablet 50 Mg PO DAILY


Lorazepam 1 Mg Tablet 1 Mg PO HS


Lorazepam 0.5 Mg Tablet 0.75 Mg PO BID94


Lamictal (Lamotrigine) 150 Mg Tablet 150 Mg PO BID


Probiotic (Lactobacillus Acidophilus) 1 Each Capsule 1 Each PO TIDWMEALS


Duoneb 0.5-3(2.5) Mg/3 Ml (Albuterol/Ipratropium) 3 Ml Ampul.neb 3 Ml NEB PRN 

Q4HRS PRN


Fluticasone Propionate Nasal Spray (Fluticasone Propionate) 16 Gm Spray.susp 2 

[place] NS DAILY


Escitalopram Oxalate 10 Mg Tablet 10 Mg PO DAILY


Donepezil Hcl 5 Mg Tablet 5 Mg PO HS


Dicyclomine Hcl 10 Mg Capsule 10 Mg PO BID


I have reviewed the current psychotropics carefully including drug 

interactions.  Risk benefit ratio favors no change other than as noted in my 

dictated progress note.





Diagnosis:


Problems:  


(1) Anxiety disorder


(2) Impulse control disorder


(3) Psychotic depression


(4) Major depressive disorder, recurrent episode


(5) Dementia, vascular, with depression


(6) Dementia, vascular, with delusions


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











NEGAR DANIEL MD Apr 27, 2018 22:57

## 2018-04-28 VITALS — DIASTOLIC BLOOD PRESSURE: 62 MMHG | SYSTOLIC BLOOD PRESSURE: 156 MMHG

## 2018-04-28 VITALS — SYSTOLIC BLOOD PRESSURE: 151 MMHG | DIASTOLIC BLOOD PRESSURE: 74 MMHG

## 2018-04-28 LAB
ALBUMIN SERPL-MCNC: 3.5 G/DL (ref 3.4–5)
ALBUMIN/GLOB SERPL: 1 {RATIO} (ref 1–1.7)
ALP SERPL-CCNC: 111 U/L (ref 46–116)
ALT SERPL-CCNC: 22 U/L (ref 14–59)
ANION GAP SERPL CALC-SCNC: 6 MMOL/L (ref 6–14)
AST SERPL-CCNC: 19 U/L (ref 15–37)
BASOPHILS # BLD AUTO: 0 X10^3/UL (ref 0–0.2)
BASOPHILS NFR BLD: 1 % (ref 0–3)
BILIRUB SERPL-MCNC: 0.2 MG/DL (ref 0.2–1)
BUN/CREAT SERPL: 19 (ref 6–20)
CA-I SERPL ISE-MCNC: 13 MG/DL (ref 7–20)
CALCIUM SERPL-MCNC: 9.1 MG/DL (ref 8.5–10.1)
CHLORIDE SERPL-SCNC: 97 MMOL/L (ref 98–107)
CO2 SERPL-SCNC: 29 MMOL/L (ref 21–32)
CREAT SERPL-MCNC: 0.7 MG/DL (ref 0.6–1)
EOSINOPHIL NFR BLD: 0.1 X10^3/UL (ref 0–0.7)
EOSINOPHIL NFR BLD: 1 % (ref 0–3)
ERYTHROCYTE [DISTWIDTH] IN BLOOD BY AUTOMATED COUNT: 16.8 % (ref 11.5–14.5)
GFR SERPLBLD BASED ON 1.73 SQ M-ARVRAT: 82.5 ML/MIN
GLOBULIN SER-MCNC: 3.5 G/DL (ref 2.2–3.8)
GLUCOSE SERPL-MCNC: 85 MG/DL (ref 70–99)
HCT VFR BLD CALC: 34.6 % (ref 36–47)
HGB BLD-MCNC: 11.8 G/DL (ref 12–15.5)
LYMPHOCYTES # BLD: 1.5 X10^3/UL (ref 1–4.8)
LYMPHOCYTES NFR BLD AUTO: 24 % (ref 24–48)
MCH RBC QN AUTO: 33 PG (ref 25–35)
MCHC RBC AUTO-ENTMCNC: 34 G/DL (ref 31–37)
MCV RBC AUTO: 96 FL (ref 79–100)
MONO #: 0.7 X10^3/UL (ref 0–1.1)
MONOCYTES NFR BLD: 11 % (ref 0–9)
NEUT #: 4 X10^3UL (ref 1.8–7.7)
NEUTROPHILS NFR BLD AUTO: 64 % (ref 31–73)
PLATELET # BLD AUTO: 425 X10^3/UL (ref 140–400)
POTASSIUM SERPL-SCNC: 4.5 MMOL/L (ref 3.5–5.1)
PROT SERPL-MCNC: 7 G/DL (ref 6.4–8.2)
RBC # BLD AUTO: 3.61 X10^6/UL (ref 3.5–5.4)
SODIUM SERPL-SCNC: 132 MMOL/L (ref 136–145)
WBC # BLD AUTO: 6.2 X10^3/UL (ref 4–11)

## 2018-04-28 RX ADMIN — Medication SCH CAP: at 20:29

## 2018-04-28 RX ADMIN — Medication SCH CAP: at 08:06

## 2018-04-28 RX ADMIN — LAMOTRIGINE SCH MG: 150 TABLET ORAL at 08:07

## 2018-04-28 RX ADMIN — LAMOTRIGINE SCH MG: 150 TABLET ORAL at 20:29

## 2018-04-28 RX ADMIN — FLUTICASONE PROPIONATE SCH SPRAY: 50 SPRAY, METERED NASAL at 08:10

## 2018-04-28 RX ADMIN — QUETIAPINE FUMARATE SCH MG: 50 TABLET, FILM COATED ORAL at 16:44

## 2018-04-28 RX ADMIN — ACETAMINOPHEN PRN MG: 325 TABLET, FILM COATED ORAL at 19:03

## 2018-04-28 RX ADMIN — PANTOPRAZOLE SODIUM SCH MG: 40 TABLET, DELAYED RELEASE ORAL at 08:07

## 2018-04-28 RX ADMIN — DICYCLOMINE HYDROCHLORIDE SCH MG: 10 CAPSULE ORAL at 20:29

## 2018-04-28 RX ADMIN — QUETIAPINE FUMARATE SCH MG: 50 TABLET, FILM COATED ORAL at 13:31

## 2018-04-28 RX ADMIN — QUETIAPINE FUMARATE SCH MG: 50 TABLET, FILM COATED ORAL at 08:07

## 2018-04-28 RX ADMIN — MINOXIDIL SCH MG: 2.5 TABLET ORAL at 08:08

## 2018-04-28 RX ADMIN — DICYCLOMINE HYDROCHLORIDE SCH MG: 10 CAPSULE ORAL at 08:07

## 2018-04-28 RX ADMIN — DONEPEZIL HYDROCHLORIDE SCH MG: 5 TABLET, FILM COATED ORAL at 20:29

## 2018-04-28 RX ADMIN — ACETAMINOPHEN PRN MG: 325 TABLET, FILM COATED ORAL at 09:14

## 2018-04-28 RX ADMIN — QUETIAPINE FUMARATE SCH MG: 50 TABLET, FILM COATED ORAL at 13:30

## 2018-04-28 RX ADMIN — LOSARTAN POTASSIUM SCH MG: 50 TABLET, FILM COATED ORAL at 08:06

## 2018-04-28 NOTE — PN
DATE:  04/25/2018



PSYCHIATRIC PROGRESS NOTE



This is a late entry 04/25/2018, covers elements not covered in my initial note

04/25/2018.



SUBJECTIVE:  I met with the patient in the evening.  Per nursing report, she has

had a little better day, calmer.  Her daughter questions whether the patient

could have gluten intolerance causing all of her mental status changes.  Nursing

staff have addressed this with Dr. Nelson.  Nothing clearly to indicate this, but

we will check with the dietitian whether she can be on a gluten-free diet to see

if it makes any difference.



REVIEW OF SYSTEMS:  Ambulation impaired, in wheelchair.  No CV, , pulmonary,

eye system symptoms on review.



MENTAL STATUS EXAM:  Oriented to herself and situation.  Speech has some

latency, at times rapid otherwise, abstraction fair, computation impaired,

language function intact.  Mood and affect remain somewhat labile, but less so

than before.



LABORATORY DATA:  Reviewed.



IMPRESSION:  Unchanged from initial note.



PLAN:  Continue current psychotropics for now.





______________________________

MAN IRINEO DANIEL MD



DR:  REMY/cherrie  JOB#:  [id number] / [id number]

DD:  04/27/2018 12:46  DT:  04/28/2018 11:09

## 2018-04-28 NOTE — PN
DATE:  04/26/2018



PSYCHIATRIC PROGRESS NOTE



This is a late entry for 04/26/2018, covers elements not covered in my initial

note of 04/26/2018.



SUBJECTIVE:  The patient was staffed at a treatment team meeting with the entire

team.  In the morning reviewed her history at length and progress, and seen

individually in the evening.  The patient is sleeping reasonably well.  In the

evening, she was "horrible, sarcastic, snarky, refused her medications at 1:00

p.m." per nursing report.  Her daughter wonders that the patient has celiac

disease causing all her mental status changes.  We will have a dietary consult

for gluten-free diet and I will defer the medical site to Dr. Nelson.



REVIEW OF SYSTEMS:  Ambulation impaired, in wheelchair.  No CV, , pulmonary,

eye system symptoms on review.



MENTAL STATUS EXAM:  Oriented to herself and situation.  Speech coherent, less

pressured.  Abstraction fair, computation impaired, language function intact. 

Mood and affect somewhat labile.



LABORATORY DATA:  Reviewed.



IMPRESSION:  Unchanged from initial note.



PLAN:  Continue psychotropics mentioned in my initial note.





______________________________

MAN IRINEO DANIEL MD



DR:  REMY/cherrie  JOB#:  [id number] / [id number]

DD:  04/27/2018 17:12  DT:  04/28/2018 21:37

## 2018-04-28 NOTE — PDOC
Exam


Note:


Sanchez Note:


Please also refer to the separate dictated note~for this date of service 

dictated separately.~Patient seen individually. Discussed the patient with 

Nursing staff reviewed the chart.~Reviewed interim history and current 

functioning. Reviewed vital signs,~Labs/ Radiology~and current medications 

noted below. Continue current treatment with the changes noted in the dictated 

addendum note





Assessment:


Vital Signs:





 Vital Signs








  Date Time  Temp Pulse Resp B/P (MAP) Pulse Ox O2 Delivery O2 Flow Rate FiO2


 


4/28/18 17:26 97.5 89 16 151/74 (99) 98   


 


4/25/18 06:34      Room Air  








I&O











Intake and Output 


 


 4/28/18





 07:00


 


Intake Total 720 ml


 


Output Total 900 ml


 


Balance -180 ml


 


 


 


Intake Oral 720 ml


 


Output Urine Total 900 ml


 


# Bowel Movements 3








Labs:





 Laboratory Tests








Test


  4/28/18


07:42


 


White Blood Count


  6.2 x10^3/uL


(4.0-11.0)


 


Red Blood Count


  3.61 x10^6/uL


(3.50-5.40)


 


Hemoglobin


  11.8 g/dL


(12.0-15.5)  L


 


Hematocrit


  34.6 %


(36.0-47.0)  L


 


Mean Corpuscular Volume


  96 fL ()


 


 


Mean Corpuscular Hemoglobin 33 pg (25-35)  


 


Mean Corpuscular Hemoglobin


Concent 34 g/dL


(31-37)


 


Red Cell Distribution Width


  16.8 %


(11.5-14.5)  H


 


Platelet Count


  425 x10^3/uL


(140-400)  H


 


Neutrophils (%) (Auto) 64 % (31-73)  


 


Lymphocytes (%) (Auto) 24 % (24-48)  


 


Monocytes (%) (Auto) 11 % (0-9)  H


 


Eosinophils (%) (Auto) 1 % (0-3)  


 


Basophils (%) (Auto) 1 % (0-3)  


 


Neutrophils # (Auto)


  4.0 x10^3uL


(1.8-7.7)


 


Lymphocytes # (Auto)


  1.5 x10^3/uL


(1.0-4.8)


 


Monocytes # (Auto)


  0.7 x10^3/uL


(0.0-1.1)


 


Eosinophils # (Auto)


  0.1 x10^3/uL


(0.0-0.7)


 


Basophils # (Auto)


  0.0 x10^3/uL


(0.0-0.2)


 


Sodium Level


  132 mmol/L


(136-145)  L


 


Potassium Level


  4.5 mmol/L


(3.5-5.1)


 


Chloride Level


  97 mmol/L


()  L


 


Carbon Dioxide Level


  29 mmol/L


(21-32)


 


Anion Gap 6 (6-14)  


 


Blood Urea Nitrogen


  13 mg/dL


(7-20)


 


Creatinine


  0.7 mg/dL


(0.6-1.0)


 


Estimated GFR


(Cockcroft-Gault) 82.5  


 


 


BUN/Creatinine Ratio 19 (6-20)  


 


Glucose Level


  85 mg/dL


(70-99)


 


Calcium Level


  9.1 mg/dL


(8.5-10.1)


 


Total Bilirubin


  0.2 mg/dL


(0.2-1.0)


 


Aspartate Amino Transferase


(AST) 19 U/L (15-37)


 


 


Alanine Aminotransferase (ALT)


  22 U/L (14-59)


 


 


Alkaline Phosphatase


  111 U/L


()


 


Total Protein


  7.0 g/dL


(6.4-8.2)


 


Albumin


  3.5 g/dL


(3.4-5.0)


 


Albumin/Globulin Ratio 1.0 (1.0-1.7)  











Current Medications:


Meds:





Current Medications


Acetaminophen (Tylenol) 650 mg PRN Q6HRS  PRN PO PAIN / TEMP Last administered 

on 4/24/18at 08:26;  Start 4/11/18 at 19:30;  Stop 4/24/18 at 14:04;  Status DC


Multi-Ingredient Ointment (Analgesic Balm) 1 logan PRN QID  PRN TP MUSCLE PAIN 

Last administered on 4/27/18at 02:09;  Start 4/11/18 at 19:30


Al Hydroxide/Mg Hydroxide (Mylanta Plus Xs) 15 ml PRN AFTMEALHC  PRN PO 

DYSPEPSIA;  Start 4/11/18 at 19:30


Magnesium Hydroxide (Milk Of Magnesia) 2,400 mg PRN QHS  PRN PO CONSTIPATION;  

Start 4/11/18 at 19:30


Donepezil HCl (Aricept) 5 mg HS PO  Last administered on 4/28/18at 20:29;  

Start 4/11/18 at 23:00


Lorazepam (Ativan) 0.75 mg BID94 PO  Last administered on 4/17/18at 16:07;  

Start 4/12/18 at 09:00;  Stop 4/17/18 at 18:34;  Status DC


Lorazepam (Ativan) 1 mg HS PO  Last administered on 4/12/18at 20:04;  Start 4/11 /18 at 23:00;  Stop 4/13/18 at 18:25;  Status DC


Olanzapine (ZyPREXA) 5 mg PRN BID  PRN PO ANXIETY / AGITATION Last administered 

on 4/16/18at 13:32;  Start 4/11/18 at 22:00;  Stop 4/16/18 at 18:40;  Status DC


Dicyclomine HCl (Bentyl) 10 mg BID PO  Last administered on 4/28/18at 20:29;  

Start 4/11/18 at 23:00


Fluticasone Propionate (Flonase) 2 spray DAILY NS  Last administered on 4/28/ 18at 08:10;  Start 4/12/18 at 09:00


Albuterol/ Ipratropium (Duoneb) 3 ml PRN Q4HRS  PRN NEB SHORTNESS OF BREATH;  

Start 4/11/18 at 22:00


Lamotrigine (LaMICtal) 150 mg BID PO  Last administered on 4/28/18at 20:29;  

Start 4/11/18 at 23:00


Losartan Potassium (Cozaar) 50 mg DAILY PO  Last administered on 4/28/18at 08:06

;  Start 4/12/18 at 09:00


Minoxidil (Loniten) 10 mg DAILY PO  Last administered on 4/28/18at 08:08;  

Start 4/12/18 at 09:00


Nicotine (Nicoderm Cq 14mg) 1 patch DAILY TD  Last administered on 4/22/18at 08:

16;  Start 4/12/18 at 09:00;  Stop 4/25/18 at 09:03;  Status DC


Oxcarbazepine (Trileptal) 600 mg BID PO  Last administered on 4/26/18at 07:48;  

Start 4/12/18 at 09:00;  Stop 4/26/18 at 19:36;  Status DC


Pantoprazole Sodium (Protonix) 40 mg DAILY PO  Last administered on 4/28/18at 08

:07;  Start 4/12/18 at 09:00


Trimethoprim/ Sulfamethoxazole (Bactrim Ds) 1 tab BID PO  Last administered on 4 /13/18at 09:00;  Start 4/11/18 at 23:00;  Stop 4/13/18 at 16:19;  Status DC


Citalopram Hydrobromide (CeleXA) 20 mg DAILY PO  Last administered on 4/14/18at 

08:39;  Start 4/12/18 at 09:00;  Stop 4/14/18 at 18:32;  Status DC


Lactobacillus Rhamnosus (Culturelle) 1 cap BID PO  Last administered on 4/28/ 18at 20:29;  Start 4/12/18 at 09:00


Amoxicillin (Amoxil) 500 mg FVS956 PO  Last administered on 4/19/18at 08:35;  

Start 4/13/18 at 21:00;  Stop 4/19/18 at 09:00;  Status DC


Ciprofloxacin (Cipro) 250 mg BID PO  Last administered on 4/19/18at 08:35;  

Start 4/13/18 at 21:00;  Stop 4/19/18 at 09:00;  Status DC


Lactobacillus Rhamnosus (Culturelle) 1 cap BID PO ;  Start 4/13/18 at 21:00;  

Stop 4/13/18 at 21:00;  Status DC


Lorazepam (Ativan) 0.75 mg HS PO  Last administered on 4/23/18at 19:27;  Start 4 /14/18 at 21:00;  Stop 4/24/18 at 19:26;  Status DC


Lorazepam (Ativan) 1 mg QHS PO  Last administered on 4/13/18at 19:42;  Start 4/ 13/18 at 21:00;  Stop 4/14/18 at 20:30;  Status DC


Fluvoxamine Maleate (Luvox) 25 mg QHS PO  Last administered on 4/16/18at 20:01;

  Start 4/14/18 at 21:00;  Stop 4/16/18 at 23:00;  Status DC


Fluvoxamine Maleate (Luvox) 50 mg HS PO  Last administered on 4/28/18at 20:29;  

Start 4/17/18 at 21:00


Olanzapine (ZyPREXA ZYDIS) 2.5 mg PRN Q2HR  PRN PO PSYCHOSIS Last administered 

on 4/26/18at 12:50;  Start 4/16/18 at 18:45


Lorazepam (Ativan) 0.5 mg DAILY PO  Last administered on 4/26/18at 07:50;  

Start 4/18/18 at 09:00;  Stop 4/26/18 at 12:26;  Status DC


Lorazepam (Ativan) 0.75 mg 1600 PO  Last administered on 4/25/18at 17:00;  

Start 4/18/18 at 16:00;  Stop 4/26/18 at 12:26;  Status DC


Quetiapine Fumarate (SEROquel) 12.5 mg 0900,1300,1700 PO  Last administered on 4 /19/18at 16:22;  Start 4/19/18 at 09:00;  Stop 4/19/18 at 18:23;  Status DC


Quetiapine Fumarate (SEROquel) 25 mg 0900,1300,1700 PO  Last administered on 4/ 20/18at 16:52;  Start 4/20/18 at 09:00;  Stop 4/20/18 at 18:14;  Status DC


Vitamin D (Vitamin D3) 50,000 unit WEEKLY PO  Last administered on 4/20/18at 09:

15;  Start 4/20/18 at 09:00


Quetiapine Fumarate (SEROquel) 37.5 mg 0900,1300,1700 PO  Last administered on 4 /23/18at 16:54;  Start 4/21/18 at 09:00;  Stop 4/23/18 at 18:12;  Status DC


Ondansetron HCl (Zofran Odt) 4 mg PRN Q8HRS  PRN PO NAUSEA/VOMITING Last 

administered on 4/25/18at 11:26;  Start 4/22/18 at 15:15


Quetiapine Fumarate (SEROquel) 50 mg 0900,1300,1700 PO  Last administered on 4/ 28/18at 16:44;  Start 4/24/18 at 09:00


Acetaminophen (Tylenol) 650 mg PRN Q4HRS  PRN PO PAIN / TEMP Last administered 

on 4/28/18at 19:03;  Start 4/24/18 at 14:15


Hyoscyamine (Anaspaz) 0.125 mg PRN Q4HRS  PRN PO STOMACH CRAMPING Last 

administered on 4/25/18at 14:13;  Start 4/24/18 at 14:15


Lorazepam (Ativan) 0.5 mg HS PO  Last administered on 4/25/18at 19:24;  Start 4/ 25/18 at 21:00;  Stop 4/26/18 at 12:31;  Status DC


Nicotine (Nicoderm Cq 14mg) 1 patch PRN DAILY  PRN TD smoking cessation;  Start 

4/26/18 at 09:00


Lorazepam (Ativan) 0.25 mg DAILY PO ;  Start 4/28/18 at 09:00;  Stop 4/28/18 at 

09:00;  Status DC


Lorazepam (Ativan) 0.5 mg 1600 PO  Last administered on 4/26/18at 16:34;  Start 

4/26/18 at 16:00;  Stop 4/27/18 at 19:43;  Status DC


Lorazepam (Ativan) 0.25 mg HS PO ;  Start 5/1/18 at 21:00;  Stop 5/1/18 at 21:00

;  Status DC


Oxcarbazepine (Trileptal) 600 mg DAILY PO  Last administered on 4/27/18at 08:39

;  Start 4/27/18 at 09:00;  Stop 4/27/18 at 19:44;  Status DC


Oxcarbazepine (Trileptal) 450 mg QHS PO  Last administered on 4/26/18at 19:51;  

Start 4/26/18 at 21:00;  Stop 4/27/18 at 19:44;  Status DC


Lorazepam (Ativan) 0.5 mg 0900,1600,2100 PO  Last administered on 4/28/18at 20:

30;  Start 4/27/18 at 21:00


Oxcarbazepine (Trileptal) 600 mg BID PO  Last administered on 4/28/18at 20:29;  

Start 4/27/18 at 21:00





Active Scripts


Active


Reported


Bactrim Ds Tablet (Sulfamethoxazole/Trimethoprim) 1 Each Tablet 1 Each PO BID


Pantoprazole Sodium 40 Mg Tablet.dr 40 Mg PO DAILY


Trileptal (Oxcarbazepine) 300 Mg Tablet 600 Mg PO BID


Zyprexa (Olanzapine) 5 Mg Tablet 5 Mg PO PRN BID PRN


NICODERM CQ 14mg (Nicotine) 1 Each Patch.td24 1 Patch TD DAILY


Minoxidil 2.5 Mg Tablet 10 Mg PO DAILY


Cozaar (Losartan Potassium) 50 Mg Tablet 50 Mg PO DAILY


Lorazepam 1 Mg Tablet 1 Mg PO HS


Lorazepam 0.5 Mg Tablet 0.75 Mg PO BID94


Lamictal (Lamotrigine) 150 Mg Tablet 150 Mg PO BID


Probiotic (Lactobacillus Acidophilus) 1 Each Capsule 1 Each PO TIDWMEALS


Duoneb 0.5-3(2.5) Mg/3 Ml (Albuterol/Ipratropium) 3 Ml Ampul.neb 3 Ml NEB PRN 

Q4HRS PRN


Fluticasone Propionate Nasal Spray (Fluticasone Propionate) 16 Gm Spray.susp 2 

[place] NS DAILY


Escitalopram Oxalate 10 Mg Tablet 10 Mg PO DAILY


Donepezil Hcl 5 Mg Tablet 5 Mg PO HS


Dicyclomine Hcl 10 Mg Capsule 10 Mg PO BID


I have reviewed the current psychotropics carefully including drug 

interactions.  Risk benefit ratio favors no change other than as noted in my 

dictated progress note.





Diagnosis:


Problems:  


(1) Anxiety disorder


(2) Impulse control disorder


(3) Psychotic depression


(4) Major depressive disorder, recurrent episode


(5) Dementia, vascular, with depression


(6) Dementia, vascular, with delusions


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











NEGAR DANIEL MD Apr 28, 2018 22:28

## 2018-04-29 VITALS — DIASTOLIC BLOOD PRESSURE: 64 MMHG | SYSTOLIC BLOOD PRESSURE: 108 MMHG

## 2018-04-29 VITALS — SYSTOLIC BLOOD PRESSURE: 175 MMHG | DIASTOLIC BLOOD PRESSURE: 77 MMHG

## 2018-04-29 LAB
ANION GAP SERPL CALC-SCNC: 9 MMOL/L (ref 6–14)
CA-I SERPL ISE-MCNC: 16 MG/DL (ref 7–20)
CALCIUM SERPL-MCNC: 9.1 MG/DL (ref 8.5–10.1)
CHLORIDE SERPL-SCNC: 97 MMOL/L (ref 98–107)
CO2 SERPL-SCNC: 29 MMOL/L (ref 21–32)
CREAT SERPL-MCNC: 0.7 MG/DL (ref 0.6–1)
GFR SERPLBLD BASED ON 1.73 SQ M-ARVRAT: 82.5 ML/MIN
GLUCOSE SERPL-MCNC: 86 MG/DL (ref 70–99)
POTASSIUM SERPL-SCNC: 4.2 MMOL/L (ref 3.5–5.1)
SODIUM SERPL-SCNC: 135 MMOL/L (ref 136–145)

## 2018-04-29 RX ADMIN — Medication SCH CAP: at 08:20

## 2018-04-29 RX ADMIN — LOSARTAN POTASSIUM SCH MG: 50 TABLET, FILM COATED ORAL at 08:21

## 2018-04-29 RX ADMIN — QUETIAPINE FUMARATE SCH MG: 50 TABLET, FILM COATED ORAL at 08:20

## 2018-04-29 RX ADMIN — QUETIAPINE FUMARATE SCH MG: 50 TABLET, FILM COATED ORAL at 17:23

## 2018-04-29 RX ADMIN — ACETAMINOPHEN PRN MG: 325 TABLET, FILM COATED ORAL at 11:39

## 2018-04-29 RX ADMIN — Medication SCH CAP: at 19:47

## 2018-04-29 RX ADMIN — PANTOPRAZOLE SODIUM SCH MG: 40 TABLET, DELAYED RELEASE ORAL at 08:20

## 2018-04-29 RX ADMIN — DONEPEZIL HYDROCHLORIDE SCH MG: 5 TABLET, FILM COATED ORAL at 19:46

## 2018-04-29 RX ADMIN — DICYCLOMINE HYDROCHLORIDE SCH MG: 10 CAPSULE ORAL at 08:22

## 2018-04-29 RX ADMIN — LAMOTRIGINE SCH MG: 150 TABLET ORAL at 08:20

## 2018-04-29 RX ADMIN — FLUTICASONE PROPIONATE SCH SPRAY: 50 SPRAY, METERED NASAL at 08:22

## 2018-04-29 RX ADMIN — ACETAMINOPHEN PRN MG: 325 TABLET, FILM COATED ORAL at 15:44

## 2018-04-29 RX ADMIN — DICYCLOMINE HYDROCHLORIDE SCH MG: 10 CAPSULE ORAL at 19:47

## 2018-04-29 RX ADMIN — QUETIAPINE FUMARATE SCH MG: 50 TABLET, FILM COATED ORAL at 13:25

## 2018-04-29 RX ADMIN — LAMOTRIGINE SCH MG: 150 TABLET ORAL at 19:47

## 2018-04-29 RX ADMIN — MINOXIDIL SCH MG: 2.5 TABLET ORAL at 08:21

## 2018-04-29 NOTE — PDOC
Exam


Note:


Sanchez Note:


Please also refer to the separate dictated note~for this date of service 

dictated separately.~Patient seen individually. Discussed the patient with 

Nursing staff reviewed the chart.~Reviewed interim history and current 

functioning. Reviewed vital signs,~Labs/ Radiology~and current medications 

noted below. Continue current treatment with the changes noted in the dictated 

addendum note





Assessment:


Vital Signs:





 Vital Signs








  Date Time  Temp Pulse Resp B/P (MAP) Pulse Ox O2 Delivery O2 Flow Rate FiO2


 


4/29/18 16:13 97.4 93 18 108/64 (79) 97   


 


4/25/18 06:34      Room Air  








I&O











Intake and Output 


 


 4/29/18





 07:00


 


Intake Total 660 ml


 


Output Total 525 ml


 


Balance 135 ml


 


 


 


Intake Oral 660 ml


 


Output Urine Total 525 ml


 


# Bowel Movements 2








Labs:





 Laboratory Tests








Test


  4/29/18


07:23


 


Sodium Level


  135 mmol/L


(136-145)  L


 


Potassium Level


  4.2 mmol/L


(3.5-5.1)


 


Chloride Level


  97 mmol/L


()  L


 


Carbon Dioxide Level


  29 mmol/L


(21-32)


 


Anion Gap 9 (6-14)  


 


Blood Urea Nitrogen


  16 mg/dL


(7-20)


 


Creatinine


  0.7 mg/dL


(0.6-1.0)


 


Estimated GFR


(Cockcroft-Gault) 82.5  


 


 


Glucose Level


  86 mg/dL


(70-99)


 


Calcium Level


  9.1 mg/dL


(8.5-10.1)











Current Medications:


Meds:





Current Medications


Acetaminophen (Tylenol) 650 mg PRN Q6HRS  PRN PO PAIN / TEMP Last administered 

on 4/24/18at 08:26;  Start 4/11/18 at 19:30;  Stop 4/24/18 at 14:04;  Status DC


Multi-Ingredient Ointment (Analgesic Balm) 1 logan PRN QID  PRN TP MUSCLE PAIN 

Last administered on 4/27/18at 02:09;  Start 4/11/18 at 19:30


Al Hydroxide/Mg Hydroxide (Mylanta Plus Xs) 15 ml PRN AFTMEALHC  PRN PO 

DYSPEPSIA;  Start 4/11/18 at 19:30


Magnesium Hydroxide (Milk Of Magnesia) 2,400 mg PRN QHS  PRN PO CONSTIPATION;  

Start 4/11/18 at 19:30


Donepezil HCl (Aricept) 5 mg HS PO  Last administered on 4/29/18at 19:46;  

Start 4/11/18 at 23:00


Lorazepam (Ativan) 0.75 mg BID94 PO  Last administered on 4/17/18at 16:07;  

Start 4/12/18 at 09:00;  Stop 4/17/18 at 18:34;  Status DC


Lorazepam (Ativan) 1 mg HS PO  Last administered on 4/12/18at 20:04;  Start 4/11 /18 at 23:00;  Stop 4/13/18 at 18:25;  Status DC


Olanzapine (ZyPREXA) 5 mg PRN BID  PRN PO ANXIETY / AGITATION Last administered 

on 4/16/18at 13:32;  Start 4/11/18 at 22:00;  Stop 4/16/18 at 18:40;  Status DC


Dicyclomine HCl (Bentyl) 10 mg BID PO  Last administered on 4/29/18at 19:47;  

Start 4/11/18 at 23:00


Fluticasone Propionate (Flonase) 2 spray DAILY NS  Last administered on 4/29/ 18at 08:22;  Start 4/12/18 at 09:00


Albuterol/ Ipratropium (Duoneb) 3 ml PRN Q4HRS  PRN NEB SHORTNESS OF BREATH;  

Start 4/11/18 at 22:00


Lamotrigine (LaMICtal) 150 mg BID PO  Last administered on 4/29/18at 19:47;  

Start 4/11/18 at 23:00


Losartan Potassium (Cozaar) 50 mg DAILY PO  Last administered on 4/29/18at 08:21

;  Start 4/12/18 at 09:00


Minoxidil (Loniten) 10 mg DAILY PO  Last administered on 4/29/18at 08:21;  

Start 4/12/18 at 09:00


Nicotine (Nicoderm Cq 14mg) 1 patch DAILY TD  Last administered on 4/22/18at 08:

16;  Start 4/12/18 at 09:00;  Stop 4/25/18 at 09:03;  Status DC


Oxcarbazepine (Trileptal) 600 mg BID PO  Last administered on 4/26/18at 07:48;  

Start 4/12/18 at 09:00;  Stop 4/26/18 at 19:36;  Status DC


Pantoprazole Sodium (Protonix) 40 mg DAILY PO  Last administered on 4/29/18at 08

:20;  Start 4/12/18 at 09:00


Trimethoprim/ Sulfamethoxazole (Bactrim Ds) 1 tab BID PO  Last administered on 4 /13/18at 09:00;  Start 4/11/18 at 23:00;  Stop 4/13/18 at 16:19;  Status DC


Citalopram Hydrobromide (CeleXA) 20 mg DAILY PO  Last administered on 4/14/18at 

08:39;  Start 4/12/18 at 09:00;  Stop 4/14/18 at 18:32;  Status DC


Lactobacillus Rhamnosus (Culturelle) 1 cap BID PO  Last administered on 4/29/ 18at 19:47;  Start 4/12/18 at 09:00


Amoxicillin (Amoxil) 500 mg [id number] PO  Last administered on 4/19/18at 08:35;  

Start 4/13/18 at 21:00;  Stop 4/19/18 at 09:00;  Status DC


Ciprofloxacin (Cipro) 250 mg BID PO  Last administered on 4/19/18at 08:35;  

Start 4/13/18 at 21:00;  Stop 4/19/18 at 09:00;  Status DC


Lactobacillus Rhamnosus (Culturelle) 1 cap BID PO ;  Start 4/13/18 at 21:00;  

Stop 4/13/18 at 21:00;  Status DC


Lorazepam (Ativan) 0.75 mg HS PO  Last administered on 4/23/18at 19:27;  Start 4 /14/18 at 21:00;  Stop 4/24/18 at 19:26;  Status DC


Lorazepam (Ativan) 1 mg QHS PO  Last administered on 4/13/18at 19:42;  Start 4/ 13/18 at 21:00;  Stop 4/14/18 at 20:30;  Status DC


Fluvoxamine Maleate (Luvox) 25 mg QHS PO  Last administered on 4/16/18at 20:01;

  Start 4/14/18 at 21:00;  Stop 4/16/18 at 23:00;  Status DC


Fluvoxamine Maleate (Luvox) 50 mg HS PO  Last administered on 4/29/18at 19:47;  

Start 4/17/18 at 21:00


Olanzapine (ZyPREXA ZYDIS) 2.5 mg PRN Q2HR  PRN PO PSYCHOSIS Last administered 

on 4/26/18at 12:50;  Start 4/16/18 at 18:45


Lorazepam (Ativan) 0.5 mg DAILY PO  Last administered on 4/26/18at 07:50;  

Start 4/18/18 at 09:00;  Stop 4/26/18 at 12:26;  Status DC


Lorazepam (Ativan) 0.75 mg 1600 PO  Last administered on 4/25/18at 17:00;  

Start 4/18/18 at 16:00;  Stop 4/26/18 at 12:26;  Status DC


Quetiapine Fumarate (SEROquel) 12.5 mg 0900,1300,1700 PO  Last administered on 4 /19/18at 16:22;  Start 4/19/18 at 09:00;  Stop 4/19/18 at 18:23;  Status DC


Quetiapine Fumarate (SEROquel) 25 mg 0900,1300,1700 PO  Last administered on 4/ 20/18at 16:52;  Start 4/20/18 at 09:00;  Stop 4/20/18 at 18:14;  Status DC


Vitamin D (Vitamin D3) 50,000 unit WEEKLY PO  Last administered on 4/20/18at 09:

15;  Start 4/20/18 at 09:00


Quetiapine Fumarate (SEROquel) 37.5 mg 0900,1300,1700 PO  Last administered on 4 /23/18at 16:54;  Start 4/21/18 at 09:00;  Stop 4/23/18 at 18:12;  Status DC


Ondansetron HCl (Zofran Odt) 4 mg PRN Q8HRS  PRN PO NAUSEA/VOMITING Last 

administered on 4/25/18at 11:26;  Start 4/22/18 at 15:15


Quetiapine Fumarate (SEROquel) 50 mg 0900,1300,1700 PO  Last administered on 4/ 29/18at 17:23;  Start 4/24/18 at 09:00


Acetaminophen (Tylenol) 650 mg PRN Q4HRS  PRN PO PAIN / TEMP Last administered 

on 4/29/18at 15:44;  Start 4/24/18 at 14:15


Hyoscyamine (Anaspaz) 0.125 mg PRN Q4HRS  PRN PO STOMACH CRAMPING Last 

administered on 4/25/18at 14:13;  Start 4/24/18 at 14:15


Lorazepam (Ativan) 0.5 mg HS PO  Last administered on 4/25/18at 19:24;  Start 4/ 25/18 at 21:00;  Stop 4/26/18 at 12:31;  Status DC


Nicotine (Nicoderm Cq 14mg) 1 patch PRN DAILY  PRN TD smoking cessation;  Start 

4/26/18 at 09:00


Lorazepam (Ativan) 0.25 mg DAILY PO ;  Start 4/28/18 at 09:00;  Stop 4/28/18 at 

09:00;  Status DC


Lorazepam (Ativan) 0.5 mg 1600 PO  Last administered on 4/26/18at 16:34;  Start 

4/26/18 at 16:00;  Stop 4/27/18 at 19:43;  Status DC


Lorazepam (Ativan) 0.25 mg HS PO ;  Start 5/1/18 at 21:00;  Stop 5/1/18 at 21:00

;  Status DC


Oxcarbazepine (Trileptal) 600 mg DAILY PO  Last administered on 4/27/18at 08:39

;  Start 4/27/18 at 09:00;  Stop 4/27/18 at 19:44;  Status DC


Oxcarbazepine (Trileptal) 450 mg QHS PO  Last administered on 4/26/18at 19:51;  

Start 4/26/18 at 21:00;  Stop 4/27/18 at 19:44;  Status DC


Lorazepam (Ativan) 0.5 mg 0900,1600,2100 PO  Last administered on 4/29/18at 19:

55;  Start 4/27/18 at 21:00;  Stop 5/1/18 at 20:59


Oxcarbazepine (Trileptal) 600 mg BID PO  Last administered on 4/29/18at 19:47;  

Start 4/27/18 at 21:00


Lorazepam (Ativan) 0.25 mg QHS PO ;  Start 5/1/18 at 21:00


Lorazepam (Ativan) 0.5 mg BID@0900,1600 PO ;  Start 5/2/18 at 09:00





Active Scripts


Active


Reported


Bactrim Ds Tablet (Sulfamethoxazole/Trimethoprim) 1 Each Tablet 1 Each PO BID


Pantoprazole Sodium 40 Mg Tablet.dr 40 Mg PO DAILY


Trileptal (Oxcarbazepine) 300 Mg Tablet 600 Mg PO BID


Zyprexa (Olanzapine) 5 Mg Tablet 5 Mg PO PRN BID PRN


NICODERM CQ 14mg (Nicotine) 1 Each Patch.td24 1 Patch TD DAILY


Minoxidil 2.5 Mg Tablet 10 Mg PO DAILY


Cozaar (Losartan Potassium) 50 Mg Tablet 50 Mg PO DAILY


Lorazepam 1 Mg Tablet 1 Mg PO HS


Lorazepam 0.5 Mg Tablet 0.75 Mg PO BID94


Lamictal (Lamotrigine) 150 Mg Tablet 150 Mg PO BID


Probiotic (Lactobacillus Acidophilus) 1 Each Capsule 1 Each PO TIDWMEALS


Duoneb 0.5-3(2.5) Mg/3 Ml (Albuterol/Ipratropium) 3 Ml Ampul.neb 3 Ml NEB PRN 

Q4HRS PRN


Fluticasone Propionate Nasal Spray (Fluticasone Propionate) 16 Gm Spray.susp 2 

[place] NS DAILY


Escitalopram Oxalate 10 Mg Tablet 10 Mg PO DAILY


Donepezil Hcl 5 Mg Tablet 5 Mg PO HS


Dicyclomine Hcl 10 Mg Capsule 10 Mg PO BID


I have reviewed the current psychotropics carefully including drug 

interactions.  Risk benefit ratio favors no change other than as noted in my 

dictated progress note.





Diagnosis:


Problems:  


(1) Anxiety disorder


(2) Impulse control disorder


(3) Psychotic depression


(4) Major depressive disorder, recurrent episode


(5) Dementia, vascular, with depression


(6) Dementia, vascular, with delusions


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











NEGAR DANIEL MD Apr 29, 2018 20:43

## 2018-04-29 NOTE — PN
DATE:  04/27/2018



This is a late entry of 04/27/2018 covers elements not covered in my initial

note of 04/27/2018.



SUBJECTIVE:  I met with the patient in the evening.  The patient continues to be

irritable, sarcastic per nursing report, picks through her medications, picking

and choosing what she takes and what she refuses.  She refused her Ativan at

04:00 p.m., talking about having to go to her parents' home.  After dinner

reportedly, she talked to her daughter Kate and reportedly Sintia called at

04:00 p.m. per nursing report.  She had a quasi seizure-like episode around

01:00 p.m.  Dr. Macdonald has been consulted.  We will also stop the taper of the

Ativan for now and increase her Trileptal back to 600 mg twice a day to prevent

any recurrence if this in fact was a seizure and is not entirely clear.



REVIEW OF SYSTEMS:  Ambulation impaired, in wheelchair.  No CV, , pulmonary,

eye system symptoms on review, has vague somatic symptoms, anxious, restless as

I met with her.



MENTAL STATUS EXAM:  Oriented to herself and situation.  Speech coherent, rapid

at times.  Abstraction fair, computation impaired, language function intact,

attention span short.  Mood and affect remains somewhat anxious, labile.



LABORATORY DATA:  Reviewed.



IMPRESSION:  Unchanged from initial note.



PLAN:  Continue current psychotropics with the changes noted above.





______________________________

MAN IRINEO DANIEL MD



DR:  REMY/cherrie  JOB#:  [id number] / [id number]

DD:  04/29/2018 11:42  DT:  04/29/2018 23:39

## 2018-04-29 NOTE — PN
DATE:  04/29/2018



NO DICTATION.





______________________________

MAN IRINEO DANIEL MD



DR:  Alyce  JOB#:  [id number] / [id number]

DD:  04/29/2018 11:33  DT:  04/29/2018 23:27

## 2018-04-30 VITALS — SYSTOLIC BLOOD PRESSURE: 136 MMHG | DIASTOLIC BLOOD PRESSURE: 60 MMHG

## 2018-04-30 VITALS — SYSTOLIC BLOOD PRESSURE: 175 MMHG | DIASTOLIC BLOOD PRESSURE: 67 MMHG

## 2018-04-30 RX ADMIN — Medication SCH CAP: at 08:19

## 2018-04-30 RX ADMIN — ACETAMINOPHEN PRN MG: 325 TABLET, FILM COATED ORAL at 08:21

## 2018-04-30 RX ADMIN — ACETAMINOPHEN PRN MG: 325 TABLET, FILM COATED ORAL at 18:27

## 2018-04-30 RX ADMIN — LOSARTAN POTASSIUM SCH MG: 50 TABLET, FILM COATED ORAL at 08:19

## 2018-04-30 RX ADMIN — QUETIAPINE FUMARATE SCH MG: 50 TABLET, FILM COATED ORAL at 08:18

## 2018-04-30 RX ADMIN — DICYCLOMINE HYDROCHLORIDE SCH MG: 10 CAPSULE ORAL at 08:18

## 2018-04-30 RX ADMIN — NICOTINE PRN PATCH: 14 PATCH, EXTENDED RELEASE TOPICAL at 08:34

## 2018-04-30 RX ADMIN — DONEPEZIL HYDROCHLORIDE SCH MG: 5 TABLET, FILM COATED ORAL at 19:47

## 2018-04-30 RX ADMIN — LAMOTRIGINE SCH MG: 150 TABLET ORAL at 19:47

## 2018-04-30 RX ADMIN — MINOXIDIL SCH MG: 2.5 TABLET ORAL at 08:20

## 2018-04-30 RX ADMIN — FLUTICASONE PROPIONATE SCH SPRAY: 50 SPRAY, METERED NASAL at 08:20

## 2018-04-30 RX ADMIN — LAMOTRIGINE SCH MG: 150 TABLET ORAL at 08:19

## 2018-04-30 RX ADMIN — DICYCLOMINE HYDROCHLORIDE SCH MG: 10 CAPSULE ORAL at 19:47

## 2018-04-30 RX ADMIN — Medication SCH CAP: at 19:47

## 2018-04-30 RX ADMIN — QUETIAPINE FUMARATE SCH MG: 50 TABLET, FILM COATED ORAL at 13:09

## 2018-04-30 RX ADMIN — QUETIAPINE FUMARATE SCH MG: 50 TABLET, FILM COATED ORAL at 17:12

## 2018-04-30 RX ADMIN — PANTOPRAZOLE SODIUM SCH MG: 40 TABLET, DELAYED RELEASE ORAL at 08:19

## 2018-04-30 NOTE — PDOC
Exam


Note:


Sanchez Note:


Please also refer to the separate dictated note~for this date of service 

dictated separately.~Patient seen individually. Discussed the patient with 

Nursing staff reviewed the chart.~Reviewed interim history and current 

functioning. Reviewed vital signs,~Labs/ Radiology~and current medications 

noted below. Continue current treatment with the changes noted in the dictated 

addendum note





Assessment:


Vital Signs:





 Vital Signs








  Date Time  Temp Pulse Resp B/P (MAP) Pulse Ox O2 Delivery O2 Flow Rate FiO2


 


4/30/18 16:09 99.2 73 18 136/60 (85) 96   


 


4/25/18 06:34      Room Air  








I&O











Intake and Output 


 


 4/30/18





 07:00


 


Intake Total 1320 ml


 


Output Total 1025 ml


 


Balance 295 ml


 


 


 


Intake Oral 1320 ml


 


Output Urine Total 1025 ml











Current Medications:


Meds:





Current Medications


Acetaminophen (Tylenol) 650 mg PRN Q6HRS  PRN PO PAIN / TEMP Last administered 

on 4/24/18at 08:26;  Start 4/11/18 at 19:30;  Stop 4/24/18 at 14:04;  Status DC


Multi-Ingredient Ointment (Analgesic Balm) 1 logan PRN QID  PRN TP MUSCLE PAIN 

Last administered on 4/27/18at 02:09;  Start 4/11/18 at 19:30


Al Hydroxide/Mg Hydroxide (Mylanta Plus Xs) 15 ml PRN AFTMEALHC  PRN PO 

DYSPEPSIA;  Start 4/11/18 at 19:30


Magnesium Hydroxide (Milk Of Magnesia) 2,400 mg PRN QHS  PRN PO CONSTIPATION;  

Start 4/11/18 at 19:30


Donepezil HCl (Aricept) 5 mg HS PO  Last administered on 4/30/18at 19:47;  

Start 4/11/18 at 23:00


Lorazepam (Ativan) 0.75 mg BID94 PO  Last administered on 4/17/18at 16:07;  

Start 4/12/18 at 09:00;  Stop 4/17/18 at 18:34;  Status DC


Lorazepam (Ativan) 1 mg HS PO  Last administered on 4/12/18at 20:04;  Start 4/11 /18 at 23:00;  Stop 4/13/18 at 18:25;  Status DC


Olanzapine (ZyPREXA) 5 mg PRN BID  PRN PO ANXIETY / AGITATION Last administered 

on 4/16/18at 13:32;  Start 4/11/18 at 22:00;  Stop 4/16/18 at 18:40;  Status DC


Dicyclomine HCl (Bentyl) 10 mg BID PO  Last administered on 4/30/18at 19:47;  

Start 4/11/18 at 23:00


Fluticasone Propionate (Flonase) 2 spray DAILY NS  Last administered on 4/30/ 18at 08:20;  Start 4/12/18 at 09:00


Albuterol/ Ipratropium (Duoneb) 3 ml PRN Q4HRS  PRN NEB SHORTNESS OF BREATH;  

Start 4/11/18 at 22:00


Lamotrigine (LaMICtal) 150 mg BID PO  Last administered on 4/30/18at 19:47;  

Start 4/11/18 at 23:00


Losartan Potassium (Cozaar) 50 mg DAILY PO  Last administered on 4/30/18at 08:19

;  Start 4/12/18 at 09:00


Minoxidil (Loniten) 10 mg DAILY PO  Last administered on 4/30/18at 08:20;  

Start 4/12/18 at 09:00


Nicotine (Nicoderm Cq 14mg) 1 patch DAILY TD  Last administered on 4/22/18at 08:

16;  Start 4/12/18 at 09:00;  Stop 4/25/18 at 09:03;  Status DC


Oxcarbazepine (Trileptal) 600 mg BID PO  Last administered on 4/26/18at 07:48;  

Start 4/12/18 at 09:00;  Stop 4/26/18 at 19:36;  Status DC


Pantoprazole Sodium (Protonix) 40 mg DAILY PO  Last administered on 4/30/18at 08

:19;  Start 4/12/18 at 09:00


Trimethoprim/ Sulfamethoxazole (Bactrim Ds) 1 tab BID PO  Last administered on 4 /13/18at 09:00;  Start 4/11/18 at 23:00;  Stop 4/13/18 at 16:19;  Status DC


Citalopram Hydrobromide (CeleXA) 20 mg DAILY PO  Last administered on 4/14/18at 

08:39;  Start 4/12/18 at 09:00;  Stop 4/14/18 at 18:32;  Status DC


Lactobacillus Rhamnosus (Culturelle) 1 cap BID PO  Last administered on 4/30/ 18at 19:47;  Start 4/12/18 at 09:00


Amoxicillin (Amoxil) 500 mg [id number] PO  Last administered on 4/19/18at 08:35;  

Start 4/13/18 at 21:00;  Stop 4/19/18 at 09:00;  Status DC


Ciprofloxacin (Cipro) 250 mg BID PO  Last administered on 4/19/18at 08:35;  

Start 4/13/18 at 21:00;  Stop 4/19/18 at 09:00;  Status DC


Lactobacillus Rhamnosus (Culturelle) 1 cap BID PO ;  Start 4/13/18 at 21:00;  

Stop 4/13/18 at 21:00;  Status DC


Lorazepam (Ativan) 0.75 mg HS PO  Last administered on 4/23/18at 19:27;  Start 4 /14/18 at 21:00;  Stop 4/24/18 at 19:26;  Status DC


Lorazepam (Ativan) 1 mg QHS PO  Last administered on 4/13/18at 19:42;  Start 4/ 13/18 at 21:00;  Stop 4/14/18 at 20:30;  Status DC


Fluvoxamine Maleate (Luvox) 25 mg QHS PO  Last administered on 4/16/18at 20:01;

  Start 4/14/18 at 21:00;  Stop 4/16/18 at 23:00;  Status DC


Fluvoxamine Maleate (Luvox) 50 mg HS PO  Last administered on 4/29/18at 19:47;  

Start 4/17/18 at 21:00;  Stop 4/30/18 at 18:57;  Status DC


Olanzapine (ZyPREXA ZYDIS) 2.5 mg PRN Q2HR  PRN PO PSYCHOSIS Last administered 

on 4/26/18at 12:50;  Start 4/16/18 at 18:45


Lorazepam (Ativan) 0.5 mg DAILY PO  Last administered on 4/26/18at 07:50;  

Start 4/18/18 at 09:00;  Stop 4/26/18 at 12:26;  Status DC


Lorazepam (Ativan) 0.75 mg 1600 PO  Last administered on 4/25/18at 17:00;  

Start 4/18/18 at 16:00;  Stop 4/26/18 at 12:26;  Status DC


Quetiapine Fumarate (SEROquel) 12.5 mg 0900,1300,1700 PO  Last administered on 4 /19/18at 16:22;  Start 4/19/18 at 09:00;  Stop 4/19/18 at 18:23;  Status DC


Quetiapine Fumarate (SEROquel) 25 mg 0900,1300,1700 PO  Last administered on 4/ 20/18at 16:52;  Start 4/20/18 at 09:00;  Stop 4/20/18 at 18:14;  Status DC


Vitamin D (Vitamin D3) 50,000 unit WEEKLY PO  Last administered on 4/20/18at 09:

15;  Start 4/20/18 at 09:00


Quetiapine Fumarate (SEROquel) 37.5 mg 0900,1300,1700 PO  Last administered on 4 /23/18at 16:54;  Start 4/21/18 at 09:00;  Stop 4/23/18 at 18:12;  Status DC


Ondansetron HCl (Zofran Odt) 4 mg PRN Q8HRS  PRN PO NAUSEA/VOMITING Last 

administered on 4/25/18at 11:26;  Start 4/22/18 at 15:15


Quetiapine Fumarate (SEROquel) 50 mg 0900,1300,1700 PO  Last administered on 4/ 30/18at 17:12;  Start 4/24/18 at 09:00


Acetaminophen (Tylenol) 650 mg PRN Q4HRS  PRN PO PAIN / TEMP Last administered 

on 4/30/18at 18:27;  Start 4/24/18 at 14:15


Hyoscyamine (Anaspaz) 0.125 mg PRN Q4HRS  PRN PO STOMACH CRAMPING Last 

administered on 4/25/18at 14:13;  Start 4/24/18 at 14:15


Lorazepam (Ativan) 0.5 mg HS PO  Last administered on 4/25/18at 19:24;  Start 4/ 25/18 at 21:00;  Stop 4/26/18 at 12:31;  Status DC


Nicotine (Nicoderm Cq 14mg) 1 patch PRN DAILY  PRN TD smoking cessation Last 

administered on 4/30/18at 08:34;  Start 4/26/18 at 09:00


Lorazepam (Ativan) 0.25 mg DAILY PO ;  Start 4/28/18 at 09:00;  Stop 4/28/18 at 

09:00;  Status DC


Lorazepam (Ativan) 0.5 mg 1600 PO  Last administered on 4/26/18at 16:34;  Start 

4/26/18 at 16:00;  Stop 4/27/18 at 19:43;  Status DC


Lorazepam (Ativan) 0.25 mg HS PO ;  Start 5/1/18 at 21:00;  Stop 5/1/18 at 21:00

;  Status DC


Oxcarbazepine (Trileptal) 600 mg DAILY PO  Last administered on 4/27/18at 08:39

;  Start 4/27/18 at 09:00;  Stop 4/27/18 at 19:44;  Status DC


Oxcarbazepine (Trileptal) 450 mg QHS PO  Last administered on 4/26/18at 19:51;  

Start 4/26/18 at 21:00;  Stop 4/27/18 at 19:44;  Status DC


Lorazepam (Ativan) 0.5 mg 0900,1600,2100 PO  Last administered on 4/30/18at 19:

48;  Start 4/27/18 at 21:00;  Stop 5/1/18 at 20:59


Oxcarbazepine (Trileptal) 600 mg BID PO  Last administered on 4/30/18at 19:47;  

Start 4/27/18 at 21:00


Lorazepam (Ativan) 0.25 mg QHS PO ;  Start 5/1/18 at 21:00


Lorazepam (Ativan) 0.5 mg BID@0900,1600 PO ;  Start 5/2/18 at 09:00


Fluvoxamine Maleate (Luvox) 75 mg HS PO  Last administered on 4/30/18at 19:47;  

Start 4/30/18 at 21:00





Active Scripts


Active


Reported


Bactrim Ds Tablet (Sulfamethoxazole/Trimethoprim) 1 Each Tablet 1 Each PO BID


Pantoprazole Sodium 40 Mg Tablet.dr 40 Mg PO DAILY


Trileptal (Oxcarbazepine) 300 Mg Tablet 600 Mg PO BID


Zyprexa (Olanzapine) 5 Mg Tablet 5 Mg PO PRN BID PRN


NICODERM CQ 14mg (Nicotine) 1 Each Patch.td24 1 Patch TD DAILY


Minoxidil 2.5 Mg Tablet 10 Mg PO DAILY


Cozaar (Losartan Potassium) 50 Mg Tablet 50 Mg PO DAILY


Lorazepam 1 Mg Tablet 1 Mg PO HS


Lorazepam 0.5 Mg Tablet 0.75 Mg PO BID94


Lamictal (Lamotrigine) 150 Mg Tablet 150 Mg PO BID


Probiotic (Lactobacillus Acidophilus) 1 Each Capsule 1 Each PO TIDWMEALS


Duoneb 0.5-3(2.5) Mg/3 Ml (Albuterol/Ipratropium) 3 Ml Ampul.neb 3 Ml NEB PRN 

Q4HRS PRN


Fluticasone Propionate Nasal Spray (Fluticasone Propionate) 16 Gm Spray.susp 2 

[place] NS DAILY


Escitalopram Oxalate 10 Mg Tablet 10 Mg PO DAILY


Donepezil Hcl 5 Mg Tablet 5 Mg PO HS


Dicyclomine Hcl 10 Mg Capsule 10 Mg PO BID


I have reviewed the current psychotropics carefully including drug 

interactions.  Risk benefit ratio favors no change other than as noted in my 

dictated progress note.





Diagnosis:


Problems:  


(1) Anxiety disorder


(2) Impulse control disorder


(3) Psychotic depression


(4) Major depressive disorder, recurrent episode


(5) Dementia, vascular, with depression


(6) Dementia, vascular, with delusions


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











NEGAR DANIEL MD Apr 30, 2018 21:31

## 2018-05-01 VITALS — DIASTOLIC BLOOD PRESSURE: 74 MMHG | SYSTOLIC BLOOD PRESSURE: 162 MMHG

## 2018-05-01 VITALS — SYSTOLIC BLOOD PRESSURE: 144 MMHG | DIASTOLIC BLOOD PRESSURE: 59 MMHG

## 2018-05-01 RX ADMIN — LAMOTRIGINE SCH MG: 150 TABLET ORAL at 09:02

## 2018-05-01 RX ADMIN — MINOXIDIL SCH MG: 2.5 TABLET ORAL at 09:05

## 2018-05-01 RX ADMIN — QUETIAPINE FUMARATE SCH MG: 50 TABLET, FILM COATED ORAL at 09:02

## 2018-05-01 RX ADMIN — ACETAMINOPHEN PRN MG: 325 TABLET, FILM COATED ORAL at 09:11

## 2018-05-01 RX ADMIN — PANTOPRAZOLE SODIUM SCH MG: 40 TABLET, DELAYED RELEASE ORAL at 09:02

## 2018-05-01 RX ADMIN — Medication SCH CAP: at 20:13

## 2018-05-01 RX ADMIN — ACETAMINOPHEN PRN MG: 325 TABLET, FILM COATED ORAL at 19:01

## 2018-05-01 RX ADMIN — Medication SCH CAP: at 09:03

## 2018-05-01 RX ADMIN — QUETIAPINE FUMARATE SCH MG: 50 TABLET, FILM COATED ORAL at 12:53

## 2018-05-01 RX ADMIN — FLUTICASONE PROPIONATE SCH SPRAY: 50 SPRAY, METERED NASAL at 09:00

## 2018-05-01 RX ADMIN — DICYCLOMINE HYDROCHLORIDE SCH MG: 10 CAPSULE ORAL at 09:02

## 2018-05-01 RX ADMIN — LAMOTRIGINE SCH MG: 150 TABLET ORAL at 20:14

## 2018-05-01 RX ADMIN — DONEPEZIL HYDROCHLORIDE SCH MG: 5 TABLET, FILM COATED ORAL at 20:14

## 2018-05-01 RX ADMIN — DICYCLOMINE HYDROCHLORIDE SCH MG: 10 CAPSULE ORAL at 20:14

## 2018-05-01 RX ADMIN — LOSARTAN POTASSIUM SCH MG: 50 TABLET, FILM COATED ORAL at 09:03

## 2018-05-01 RX ADMIN — QUETIAPINE FUMARATE SCH MG: 50 TABLET, FILM COATED ORAL at 17:00

## 2018-05-01 NOTE — PN
DATE:  04/29/2018



PSYCHIATRIC PROGRESS NOTE



This late entry 04/29/2018 covers elements not covered in my initial note.



SUBJECTIVE:  The patient slept 6-1/2 hours, refused her 4:00 p.m. Ativan.  Did

well in the morning.  Daughter and son-in-law visited and then she was more

anxious, labile, wanting to leave with them.



REVIEW OF SYSTEMS:  No CV, , pulmonary, eye, ENT system symptoms on review.



MENTAL STATUS EXAM:  Oriented to herself and situation.  Speech has some

latency, coherent.  Abstraction fair, computation impaired, language function

intact, attention span short.  Mood and affect, lability is overall improved

other than above.



LABORATORIES:  Reviewed.



IMPRESSION:  Unchanged from initial note.



PLAN:  Reduce the 9:00 p.m. Ativan from 0.5 mg down to 0.25 mg, will reduce

other dosages gradually as well.  Rest unchanged from initial note.





______________________________

MAN IRINEO DANIEL MD



DR:  REMY/cherrie  JOB#:  [id number] / [id number]

DD:  04/30/2018 16:35  DT:  05/01/2018 04:00

## 2018-05-01 NOTE — PDOC
Exam


Note:


Sanchez Note:


Please also refer to the separate dictated note~for this date of service 

dictated separately.~Patient seen individually. Discussed the patient with 

Nursing staff reviewed the chart.~Reviewed interim history and current 

functioning. Reviewed vital signs,~Labs/ Radiology~and current medications 

noted below. Continue current treatment with the changes noted in the dictated 

addendum note





Assessment:


Vital Signs:





 Vital Signs








  Date Time  Temp Pulse Resp B/P (MAP) Pulse Ox O2 Delivery O2 Flow Rate FiO2


 


5/1/18 15:53 98.8 97 24 144/59 (87) 97   


 


5/1/18 06:24      Room Air  








I&O











Intake and Output 


 


 5/1/18





 07:00


 


Intake Total 720 ml


 


Output Total 900 ml


 


Balance -180 ml


 


 


 


Intake Oral 720 ml


 


Output Urine Total 900 ml


 


# Bowel Movements 1











Current Medications:


Meds:





Current Medications


Acetaminophen (Tylenol) 650 mg PRN Q6HRS  PRN PO PAIN / TEMP Last administered 

on 4/24/18at 08:26;  Start 4/11/18 at 19:30;  Stop 4/24/18 at 14:04;  Status DC


Multi-Ingredient Ointment (Analgesic Balm) 1 logan PRN QID  PRN TP MUSCLE PAIN 

Last administered on 4/27/18at 02:09;  Start 4/11/18 at 19:30


Al Hydroxide/Mg Hydroxide (Mylanta Plus Xs) 15 ml PRN AFTMEALHC  PRN PO 

DYSPEPSIA;  Start 4/11/18 at 19:30


Magnesium Hydroxide (Milk Of Magnesia) 2,400 mg PRN QHS  PRN PO CONSTIPATION;  

Start 4/11/18 at 19:30


Donepezil HCl (Aricept) 5 mg HS PO  Last administered on 5/1/18at 20:14;  Start 

4/11/18 at 23:00


Lorazepam (Ativan) 0.75 mg BID94 PO  Last administered on 4/17/18at 16:07;  

Start 4/12/18 at 09:00;  Stop 4/17/18 at 18:34;  Status DC


Lorazepam (Ativan) 1 mg HS PO  Last administered on 4/12/18at 20:04;  Start 4/11 /18 at 23:00;  Stop 4/13/18 at 18:25;  Status DC


Olanzapine (ZyPREXA) 5 mg PRN BID  PRN PO ANXIETY / AGITATION Last administered 

on 4/16/18at 13:32;  Start 4/11/18 at 22:00;  Stop 4/16/18 at 18:40;  Status DC


Dicyclomine HCl (Bentyl) 10 mg BID PO  Last administered on 5/1/18at 20:14;  

Start 4/11/18 at 23:00


Fluticasone Propionate (Flonase) 2 spray DAILY NS  Last administered on 4/30/ 18at 08:20;  Start 4/12/18 at 09:00


Albuterol/ Ipratropium (Duoneb) 3 ml PRN Q4HRS  PRN NEB SHORTNESS OF BREATH;  

Start 4/11/18 at 22:00


Lamotrigine (LaMICtal) 150 mg BID PO  Last administered on 5/1/18at 20:14;  

Start 4/11/18 at 23:00


Losartan Potassium (Cozaar) 50 mg DAILY PO  Last administered on 5/1/18at 09:03

;  Start 4/12/18 at 09:00


Minoxidil (Loniten) 10 mg DAILY PO  Last administered on 5/1/18at 09:05;  Start 

4/12/18 at 09:00


Nicotine (Nicoderm Cq 14mg) 1 patch DAILY TD  Last administered on 4/22/18at 08:

16;  Start 4/12/18 at 09:00;  Stop 4/25/18 at 09:03;  Status DC


Oxcarbazepine (Trileptal) 600 mg BID PO  Last administered on 4/26/18at 07:48;  

Start 4/12/18 at 09:00;  Stop 4/26/18 at 19:36;  Status DC


Pantoprazole Sodium (Protonix) 40 mg DAILY PO  Last administered on 5/1/18at 09:

02;  Start 4/12/18 at 09:00


Trimethoprim/ Sulfamethoxazole (Bactrim Ds) 1 tab BID PO  Last administered on 4 /13/18at 09:00;  Start 4/11/18 at 23:00;  Stop 4/13/18 at 16:19;  Status DC


Citalopram Hydrobromide (CeleXA) 20 mg DAILY PO  Last administered on 4/14/18at 

08:39;  Start 4/12/18 at 09:00;  Stop 4/14/18 at 18:32;  Status DC


Lactobacillus Rhamnosus (Culturelle) 1 cap BID PO  Last administered on 5/1/ 18at 20:13;  Start 4/12/18 at 09:00


Amoxicillin (Amoxil) 500 mg TES521 PO  Last administered on 4/19/18at 08:35;  

Start 4/13/18 at 21:00;  Stop 4/19/18 at 09:00;  Status DC


Ciprofloxacin (Cipro) 250 mg BID PO  Last administered on 4/19/18at 08:35;  

Start 4/13/18 at 21:00;  Stop 4/19/18 at 09:00;  Status DC


Lactobacillus Rhamnosus (Culturelle) 1 cap BID PO ;  Start 4/13/18 at 21:00;  

Stop 4/13/18 at 21:00;  Status DC


Lorazepam (Ativan) 0.75 mg HS PO  Last administered on 4/23/18at 19:27;  Start 4 /14/18 at 21:00;  Stop 4/24/18 at 19:26;  Status DC


Lorazepam (Ativan) 1 mg QHS PO  Last administered on 4/13/18at 19:42;  Start 4/ 13/18 at 21:00;  Stop 4/14/18 at 20:30;  Status DC


Fluvoxamine Maleate (Luvox) 25 mg QHS PO  Last administered on 4/16/18at 20:01;

  Start 4/14/18 at 21:00;  Stop 4/16/18 at 23:00;  Status DC


Fluvoxamine Maleate (Luvox) 50 mg HS PO  Last administered on 4/29/18at 19:47;  

Start 4/17/18 at 21:00;  Stop 4/30/18 at 18:57;  Status DC


Olanzapine (ZyPREXA ZYDIS) 2.5 mg PRN Q2HR  PRN PO PSYCHOSIS Last administered 

on 4/26/18at 12:50;  Start 4/16/18 at 18:45


Lorazepam (Ativan) 0.5 mg DAILY PO  Last administered on 4/26/18at 07:50;  

Start 4/18/18 at 09:00;  Stop 4/26/18 at 12:26;  Status DC


Lorazepam (Ativan) 0.75 mg 1600 PO  Last administered on 4/25/18at 17:00;  

Start 4/18/18 at 16:00;  Stop 4/26/18 at 12:26;  Status DC


Quetiapine Fumarate (SEROquel) 12.5 mg 0900,1300,1700 PO  Last administered on 4 /19/18at 16:22;  Start 4/19/18 at 09:00;  Stop 4/19/18 at 18:23;  Status DC


Quetiapine Fumarate (SEROquel) 25 mg 0900,1300,1700 PO  Last administered on 4/ 20/18at 16:52;  Start 4/20/18 at 09:00;  Stop 4/20/18 at 18:14;  Status DC


Vitamin D (Vitamin D3) 50,000 unit WEEKLY PO  Last administered on 4/20/18at 09:

15;  Start 4/20/18 at 09:00


Quetiapine Fumarate (SEROquel) 37.5 mg 0900,1300,1700 PO  Last administered on 4 /23/18at 16:54;  Start 4/21/18 at 09:00;  Stop 4/23/18 at 18:12;  Status DC


Ondansetron HCl (Zofran Odt) 4 mg PRN Q8HRS  PRN PO NAUSEA/VOMITING Last 

administered on 4/25/18at 11:26;  Start 4/22/18 at 15:15


Quetiapine Fumarate (SEROquel) 50 mg 0900,1300,1700 PO  Last administered on 5/1 /18at 17:00;  Start 4/24/18 at 09:00


Acetaminophen (Tylenol) 650 mg PRN Q4HRS  PRN PO PAIN / TEMP Last administered 

on 5/1/18at 19:01;  Start 4/24/18 at 14:15


Hyoscyamine (Anaspaz) 0.125 mg PRN Q4HRS  PRN PO STOMACH CRAMPING Last 

administered on 4/25/18at 14:13;  Start 4/24/18 at 14:15


Lorazepam (Ativan) 0.5 mg HS PO  Last administered on 4/25/18at 19:24;  Start 4/ 25/18 at 21:00;  Stop 4/26/18 at 12:31;  Status DC


Nicotine (Nicoderm Cq 14mg) 1 patch PRN DAILY  PRN TD smoking cessation Last 

administered on 4/30/18at 08:34;  Start 4/26/18 at 09:00


Lorazepam (Ativan) 0.25 mg DAILY PO ;  Start 4/28/18 at 09:00;  Stop 4/28/18 at 

09:00;  Status DC


Lorazepam (Ativan) 0.5 mg 1600 PO  Last administered on 4/26/18at 16:34;  Start 

4/26/18 at 16:00;  Stop 4/27/18 at 19:43;  Status DC


Lorazepam (Ativan) 0.25 mg HS PO ;  Start 5/1/18 at 21:00;  Stop 5/1/18 at 21:00

;  Status DC


Oxcarbazepine (Trileptal) 600 mg DAILY PO  Last administered on 4/27/18at 08:39

;  Start 4/27/18 at 09:00;  Stop 4/27/18 at 19:44;  Status DC


Oxcarbazepine (Trileptal) 450 mg QHS PO  Last administered on 4/26/18at 19:51;  

Start 4/26/18 at 21:00;  Stop 4/27/18 at 19:44;  Status DC


Lorazepam (Ativan) 0.5 mg 0900,1600,2100 PO  Last administered on 5/1/18at 17:31

;  Start 4/27/18 at 21:00;  Stop 5/1/18 at 20:59;  Status DC


Oxcarbazepine (Trileptal) 600 mg BID PO  Last administered on 5/1/18at 09:02;  

Start 4/27/18 at 21:00;  Stop 5/1/18 at 18:51;  Status DC


Lorazepam (Ativan) 0.25 mg QHS PO  Last administered on 5/1/18at 20:16;  Start 5 /1/18 at 21:00


Lorazepam (Ativan) 0.5 mg BID@0900,1600 PO ;  Start 5/2/18 at 09:00


Fluvoxamine Maleate (Luvox) 75 mg HS PO  Last administered on 5/1/18at 20:13;  

Start 4/30/18 at 21:00


Oxcarbazepine (Trileptal) 600 mg DAILY PO ;  Start 5/2/18 at 09:00


Oxcarbazepine (Trileptal) 450 mg HS PO  Last administered on 5/1/18at 20:16;  

Start 5/1/18 at 21:00





Active Scripts


Active


Reported


Bactrim Ds Tablet (Sulfamethoxazole/Trimethoprim) 1 Each Tablet 1 Each PO BID


Pantoprazole Sodium 40 Mg Tablet.dr 40 Mg PO DAILY


Trileptal (Oxcarbazepine) 300 Mg Tablet 600 Mg PO BID


Zyprexa (Olanzapine) 5 Mg Tablet 5 Mg PO PRN BID PRN


NICODERM CQ 14mg (Nicotine) 1 Each Patch.td24 1 Patch TD DAILY


Minoxidil 2.5 Mg Tablet 10 Mg PO DAILY


Cozaar (Losartan Potassium) 50 Mg Tablet 50 Mg PO DAILY


Lorazepam 1 Mg Tablet 1 Mg PO HS


Lorazepam 0.5 Mg Tablet 0.75 Mg PO BID94


Lamictal (Lamotrigine) 150 Mg Tablet 150 Mg PO BID


Probiotic (Lactobacillus Acidophilus) 1 Each Capsule 1 Each PO TIDWMEALS


Duoneb 0.5-3(2.5) Mg/3 Ml (Albuterol/Ipratropium) 3 Ml Ampul.neb 3 Ml NEB PRN 

Q4HRS PRN


Fluticasone Propionate Nasal Spray (Fluticasone Propionate) 16 Gm Spray.susp 2 

[place] NS DAILY


Escitalopram Oxalate 10 Mg Tablet 10 Mg PO DAILY


Donepezil Hcl 5 Mg Tablet 5 Mg PO HS


Dicyclomine Hcl 10 Mg Capsule 10 Mg PO BID


I have reviewed the current psychotropics carefully including drug 

interactions.  Risk benefit ratio favors no change other than as noted in my 

dictated progress note.





Diagnosis:


Problems:  


(1) Anxiety disorder


(2) Impulse control disorder


(3) Psychotic depression


(4) Major depressive disorder, recurrent episode


(5) Dementia, vascular, with depression


(6) Dementia, vascular, with delusions


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











NEGAR DANIEL MD May 1, 2018 21:33

## 2018-05-01 NOTE — PN
DATE:  04/28/2018



This late entry 04/28/2018 covers elements not covered in my initial note

04/28/2018.



SUBJECTIVE:  I met with the patient in the evening.  The patient slept 7 hours

previous evening.  Sodium 132, chloride 97, low.  We will defer to Dr. Garrison/Dr. Nelson.  Resistive for dressing changes, later cooperative.  She had

a shower yesterday.  During the day 04/28/2018, she was quite demanding,

anxious, restless.  Nursing staff have talked to the daughter about having

arrangements for the patient's care in the home as the family decides to take

her home.  She refused at 1:00 p.m. Seroquel, somewhat resistive with

medications.



REVIEW OF SYSTEMS:  Ambulation impaired, in wheelchair.  No CV, , pulmonary,

eye system symptoms on review.



MENTAL STATUS EXAM:  Oriented to herself and situation.  Speech coherent, at

times somewhat pressured.  Abstraction fair, computation impaired, language

function intact.  Mood and affect remain somewhat labile, but showing some

improvement.



LABORATORY DATA:  Reviewed.



IMPRESSION:  Unchanged from initial note.



PLAN:  Continue current psychotropics.  We may check a Trileptal level as well.





______________________________

NEGAR DANIEL MD DR:  REMY/cherire  JOB#:  [id number] / [id number]

DD:  04/30/2018 13:54  DT:  05/01/2018 03:06

## 2018-05-02 VITALS — DIASTOLIC BLOOD PRESSURE: 80 MMHG | SYSTOLIC BLOOD PRESSURE: 165 MMHG

## 2018-05-02 VITALS — SYSTOLIC BLOOD PRESSURE: 101 MMHG | DIASTOLIC BLOOD PRESSURE: 51 MMHG

## 2018-05-02 VITALS — DIASTOLIC BLOOD PRESSURE: 70 MMHG | SYSTOLIC BLOOD PRESSURE: 108 MMHG

## 2018-05-02 VITALS — SYSTOLIC BLOOD PRESSURE: 106 MMHG | DIASTOLIC BLOOD PRESSURE: 53 MMHG

## 2018-05-02 LAB
ALBUMIN SERPL-MCNC: 3.1 G/DL (ref 3.4–5)
ALBUMIN/GLOB SERPL: 1 {RATIO} (ref 1–1.7)
ALP SERPL-CCNC: 110 U/L (ref 46–116)
ALT SERPL-CCNC: 20 U/L (ref 14–59)
ANION GAP SERPL CALC-SCNC: 8 MMOL/L (ref 6–14)
AST SERPL-CCNC: 15 U/L (ref 15–37)
BASOPHILS # BLD AUTO: 0 X10^3/UL (ref 0–0.2)
BASOPHILS NFR BLD: 1 % (ref 0–3)
BGAS PCO2: 43 MMHG (ref 35–45)
BGAS PH: 7.45 (ref 7.35–7.45)
BGAS PO2: 74 MMHG (ref 71–100)
BILIRUB SERPL-MCNC: 0.1 MG/DL (ref 0.2–1)
BUN/CREAT SERPL: 40 (ref 6–20)
CA-I SERPL ISE-MCNC: 36 MG/DL (ref 7–20)
CALCIUM SERPL-MCNC: 8.8 MG/DL (ref 8.5–10.1)
CHLORIDE SERPL-SCNC: 100 MMOL/L (ref 98–107)
CO2 SERPL-SCNC: 28 MMOL/L (ref 21–32)
CREAT SERPL-MCNC: 0.9 MG/DL (ref 0.6–1)
DELTA BASE BGAS: 6 MMOL/L (ref 0–3)
EOSINOPHIL NFR BLD: 0.1 X10^3/UL (ref 0–0.7)
EOSINOPHIL NFR BLD: 2 % (ref 0–3)
ERYTHROCYTE [DISTWIDTH] IN BLOOD BY AUTOMATED COUNT: 16.8 % (ref 11.5–14.5)
GFR SERPLBLD BASED ON 1.73 SQ M-ARVRAT: 61.7 ML/MIN
GLOBULIN SER-MCNC: 3 G/DL (ref 2.2–3.8)
GLUCOSE SERPL-MCNC: 114 MG/DL (ref 70–99)
HCT VFR BLD CALC: 28.6 % (ref 36–47)
HGB BLD-MCNC: 9.7 G/DL (ref 12–15.5)
LYMPHOCYTES # BLD: 1.9 X10^3/UL (ref 1–4.8)
LYMPHOCYTES NFR BLD AUTO: 36 % (ref 24–48)
MCH RBC QN AUTO: 33 PG (ref 25–35)
MCHC RBC AUTO-ENTMCNC: 34 G/DL (ref 31–37)
MCV RBC AUTO: 97 FL (ref 79–100)
MONO #: 0.7 X10^3/UL (ref 0–1.1)
MONOCYTES NFR BLD: 14 % (ref 0–9)
NEUT #: 2.5 X10^3UL (ref 1.8–7.7)
NEUTROPHILS NFR BLD AUTO: 48 % (ref 31–73)
O2 SAT BGAS: 95 % (ref 92–99)
O2/TOTAL GAS SETTING VFR VENT: 21 %
PLATELET # BLD AUTO: 332 X10^3/UL (ref 140–400)
POTASSIUM SERPL-SCNC: 4.1 MMOL/L (ref 3.5–5.1)
PROT SERPL-MCNC: 6.1 G/DL (ref 6.4–8.2)
RBC # BLD AUTO: 2.97 X10^6/UL (ref 3.5–5.4)
SODIUM SERPL-SCNC: 136 MMOL/L (ref 136–145)
WBC # BLD AUTO: 5.2 X10^3/UL (ref 4–11)

## 2018-05-02 RX ADMIN — QUETIAPINE FUMARATE SCH MG: 50 TABLET, FILM COATED ORAL at 13:54

## 2018-05-02 RX ADMIN — DICYCLOMINE HYDROCHLORIDE SCH MG: 10 CAPSULE ORAL at 19:38

## 2018-05-02 RX ADMIN — ACETAMINOPHEN SCH MG: 325 TABLET, FILM COATED ORAL at 19:37

## 2018-05-02 RX ADMIN — QUETIAPINE FUMARATE SCH MG: 50 TABLET, FILM COATED ORAL at 17:22

## 2018-05-02 RX ADMIN — ACETAMINOPHEN SCH MG: 325 TABLET, FILM COATED ORAL at 13:54

## 2018-05-02 RX ADMIN — LAMOTRIGINE SCH MG: 150 TABLET ORAL at 08:17

## 2018-05-02 RX ADMIN — DICYCLOMINE HYDROCHLORIDE SCH MG: 10 CAPSULE ORAL at 08:17

## 2018-05-02 RX ADMIN — QUETIAPINE FUMARATE SCH MG: 50 TABLET, FILM COATED ORAL at 08:17

## 2018-05-02 RX ADMIN — MINOXIDIL SCH MG: 2.5 TABLET ORAL at 08:32

## 2018-05-02 RX ADMIN — Medication SCH CAP: at 19:37

## 2018-05-02 RX ADMIN — FLUTICASONE PROPIONATE SCH SPRAY: 50 SPRAY, METERED NASAL at 08:44

## 2018-05-02 RX ADMIN — LAMOTRIGINE SCH MG: 150 TABLET ORAL at 19:37

## 2018-05-02 RX ADMIN — ACETAMINOPHEN SCH MG: 325 TABLET, FILM COATED ORAL at 08:20

## 2018-05-02 RX ADMIN — PANTOPRAZOLE SODIUM SCH MG: 40 TABLET, DELAYED RELEASE ORAL at 08:17

## 2018-05-02 RX ADMIN — LOSARTAN POTASSIUM SCH MG: 50 TABLET, FILM COATED ORAL at 08:17

## 2018-05-02 RX ADMIN — DONEPEZIL HYDROCHLORIDE SCH MG: 5 TABLET, FILM COATED ORAL at 19:38

## 2018-05-02 RX ADMIN — Medication SCH CAP: at 08:17

## 2018-05-02 NOTE — CONS
DATE OF CONSULTATION:  04/19/2018



NEUROLOGY CONSULTATION



REFERRING PHYSICIAN:  Dr. Booth.



REASON FOR CONSULTATION:  Possible seizure-like activity with history of seizure

disorder.



HISTORY OF PRESENT ILLNESS:  This is a 72-year-old right-handed  female

who was admitted on 04/12/2018 on account of being suicidal, depressed,

combative with behavior disturbances and being verbally abusive towards the

staff.  The patient who is a resident at [facility] stated she has had history of seizures for a while, but the last

seizure was today when she had a 6-7 "petite mal seizure."  Apparently, the

patient described a new type of seizures.  She stated she had grand mal and

petite mal seizures.  She explained her seizure perfectly and typically for

seizure disorder.  The seizure activity was petite mal was short without

significant alteration of consciousness or postictal confusion or

disorientation.  The etiology of her initial seizure disorder is unknown at this

time.  Currently, she denies headaches, visual disturbances, nausea, vomiting,

chest pain, shortness of breath or palpitation, dysarthria, dysphagia, numbness

or vertigo.



PAST MEDICAL HISTORY:  Significant for hypertension, hyperlipidemia, COPD,

seizure disorder of unknown etiology, urinary tract infections and pneumonia,

and dementia.



PAST PSYCHIATRIC HISTORY:  Consistent with depression, anxiety, delusion and

dementia.



SOCIAL HISTORY:  The patient is a resident at [facility].  She denies smoking, alcohol drinking or illicit drug use.



CURRENT MEDICATIONS:  Includes lorazepam, Luvox, ____, ____ , Tylenol, Seroquel,

vitamin D, pantoprazole, Minoxidil, losartan, Flonase, lamotrigine, donepezil,

albuterol inhaler/nebulizer.



ALLERGIES:  IODINE CONTRAST, IV DYE, ____ , CARBAMAZEPINE, CEPHALEXIN,

ERYTHROMYCIN BASE, PHENOBARBITAL, PHENYTOIN.



REVIEW OF SYSTEMS:  A 10-point review of system was performed as mentioned in

the history of present illness.



PHYSICAL EXAMINATION:

GENERAL:  Well-developed, well-nourished female, not in acute distress.

VITAL SIGNS:  Blood pressure 138/75, respiratory rate 24, pulse is 107,

temperature 98.7, oxygen saturation is 97% on room air.

HEENT:  Normocephalic, atraumatic, otherwise unremarkable.

NECK:  Supple.  Negative for carotid bruit, lymphadenopathy or JVD.

LUNGS:  Clear to A and P, but diminished breathing sounds.

CARDIOVASCULAR:  Irregular rate and rhythm.  Normal S1, S2.  There is no S3, S4,

murmur.

ABDOMEN:  Soft.  Bowel sounds positive.

EXTREMITIES:  Negative for cyanosis, clubbing or pitting edema.

NEUROLOGICAL:  Mental Status:  The patient is alert and oriented x 3.  The

speech is fluent.  There is no language dysfunction.  The patient recalls 1/3

immediately and after 1 and 3 minutes.  Judgment, abstract thinking are fair. 

The patient denies hallucination or delusion.

CRANIAL NERVES:  Visual fields are full.  The pupils are reactive to light and

accommodation.  The extraocular movements are intact.  There is no nystagmus. 

There is no facial motor or sensory deficit.  Hearing is intact bilaterally. 

The palate is elevated symmetrically.  Sternocleidomastoid muscles are powerful

bilaterally.  The patient shrugs her shoulders symmetrically and protrudes her

tongue in the midline without fasciculation or atrophy.

3.  MOTOR:  No focal muscle bulk was seen.  The tone is normal.  The strength is

4/5 throughout.

4.  SENSORY EXAMINATION:  Normal pinprick and light touch senses throughout. 

Deep tendon reflexes were asymmetric and hypoactive with absent Achilles

responses.  Gait not tested as the patient stated she is able to stand and she

is confined to wheelchair.



DIAGNOSTIC DATA:  Chest x-ray revealed evidence of interstitial markings with a

1 cm nodule density in the left lung base.  A KUB x-ray revealed no evidence of

acute abdomen.



LABORATORY DATA:  On 04/16/2018 revealed white blood cells of 3.9 thousand,

hemoglobin 11.2, hematocrit 32.7, platelet count 251,000.  Chemistry revealed

sodium of 137, potassium 4.6, chloride 103, CO2 27, BUN 23, creatinine 0.7,

glucose 87, calcium 8.9.  Liver enzymes reveal low AST.  Vitamin B12 is 360 and

vitamin D is 29.3.  Urinalysis from 04/11/2018, revealed small urine leukocyte

esterase with white blood cells of 5-10 and moderate bacteria.  Urine drug

screen revealed ____ level is 36, which is high, with lamotrigine level is

normal at 5.4.



IMPRESSION:

1. History of seizure disorder of unknown.  The patient described 2 types of

seizures, the Grand mal, and petite mal seizures.  The patient stated she had

several petite mal seizure-like activity today.

2. Multiple medical problems include dementia, chronic obstructive pulmonary

disease, hypertension, hyperlipidemia, depression with some psychotic features.



RECOMMENDATIONS:

1. We adjust carbamazepine dose.

2. Continue with current medical and psychiatric care.

3. We will obtain an EEG.





______________________________

M ETHEL CAAL MD



DR:  RE/cherrie  JOB#:  [id number] / [id number]

DD:  05/01/2018 00:18  DT:  05/01/2018 19:57

## 2018-05-02 NOTE — PDOC
Exam


Note:


Sanchez Note:


Please also refer to the separate dictated note~for this date of service 

dictated separately.~Patient seen individually. Discussed the patient with 

Nursing staff reviewed the chart.~Reviewed interim history and current 

functioning. Reviewed vital signs,~Labs/ Radiology~and current medications 

noted below. Continue current treatment with the changes noted in the dictated 

addendum note





Assessment:


Vital Signs:





 Vital Signs








  Date Time  Temp Pulse Resp B/P (MAP) Pulse Ox O2 Delivery O2 Flow Rate FiO2


 


5/2/18 16:48 98.6 98 16 101/51 (68) 95   


 


5/1/18 06:24      Room Air  








I&O











Intake and Output 


 


 5/2/18





 07:00


 


Intake Total 840 ml


 


Output Total 550 ml


 


Balance 290 ml


 


 


 


Intake Oral 840 ml


 


Output Urine Total 550 ml











Current Medications:


Meds:





Current Medications


Acetaminophen (Tylenol) 650 mg PRN Q6HRS  PRN PO PAIN / TEMP Last administered 

on 4/24/18at 08:26;  Start 4/11/18 at 19:30;  Stop 4/24/18 at 14:04;  Status DC


Multi-Ingredient Ointment (Analgesic Balm) 1 logan PRN QID  PRN TP MUSCLE PAIN 

Last administered on 4/27/18at 02:09;  Start 4/11/18 at 19:30


Al Hydroxide/Mg Hydroxide (Mylanta Plus Xs) 15 ml PRN AFTMEALHC  PRN PO 

DYSPEPSIA;  Start 4/11/18 at 19:30


Magnesium Hydroxide (Milk Of Magnesia) 2,400 mg PRN QHS  PRN PO CONSTIPATION;  

Start 4/11/18 at 19:30


Donepezil HCl (Aricept) 5 mg HS PO  Last administered on 5/2/18at 19:38;  Start 

4/11/18 at 23:00


Lorazepam (Ativan) 0.75 mg BID94 PO  Last administered on 4/17/18at 16:07;  

Start 4/12/18 at 09:00;  Stop 4/17/18 at 18:34;  Status DC


Lorazepam (Ativan) 1 mg HS PO  Last administered on 4/12/18at 20:04;  Start 4/11 /18 at 23:00;  Stop 4/13/18 at 18:25;  Status DC


Olanzapine (ZyPREXA) 5 mg PRN BID  PRN PO ANXIETY / AGITATION Last administered 

on 4/16/18at 13:32;  Start 4/11/18 at 22:00;  Stop 4/16/18 at 18:40;  Status DC


Dicyclomine HCl (Bentyl) 10 mg BID PO  Last administered on 5/2/18at 19:38;  

Start 4/11/18 at 23:00


Fluticasone Propionate (Flonase) 2 spray DAILY NS  Last administered on 4/30/ 18at 08:20;  Start 4/12/18 at 09:00


Albuterol/ Ipratropium (Duoneb) 3 ml PRN Q4HRS  PRN NEB SHORTNESS OF BREATH;  

Start 4/11/18 at 22:00


Lamotrigine (LaMICtal) 150 mg BID PO  Last administered on 5/2/18at 19:37;  

Start 4/11/18 at 23:00


Losartan Potassium (Cozaar) 50 mg DAILY PO  Last administered on 5/2/18at 08:17

;  Start 4/12/18 at 09:00


Minoxidil (Loniten) 10 mg DAILY PO  Last administered on 5/2/18at 08:32;  Start 

4/12/18 at 09:00


Nicotine (Nicoderm Cq 14mg) 1 patch DAILY TD  Last administered on 4/22/18at 08:

16;  Start 4/12/18 at 09:00;  Stop 4/25/18 at 09:03;  Status DC


Oxcarbazepine (Trileptal) 600 mg BID PO  Last administered on 4/26/18at 07:48;  

Start 4/12/18 at 09:00;  Stop 4/26/18 at 19:36;  Status DC


Pantoprazole Sodium (Protonix) 40 mg DAILY PO  Last administered on 5/2/18at 08:

17;  Start 4/12/18 at 09:00


Trimethoprim/ Sulfamethoxazole (Bactrim Ds) 1 tab BID PO  Last administered on 4 /13/18at 09:00;  Start 4/11/18 at 23:00;  Stop 4/13/18 at 16:19;  Status DC


Citalopram Hydrobromide (CeleXA) 20 mg DAILY PO  Last administered on 4/14/18at 

08:39;  Start 4/12/18 at 09:00;  Stop 4/14/18 at 18:32;  Status DC


Lactobacillus Rhamnosus (Culturelle) 1 cap BID PO  Last administered on 5/2/ 18at 19:37;  Start 4/12/18 at 09:00


Amoxicillin (Amoxil) 500 mg [id number] PO  Last administered on 4/19/18at 08:35;  

Start 4/13/18 at 21:00;  Stop 4/19/18 at 09:00;  Status DC


Ciprofloxacin (Cipro) 250 mg BID PO  Last administered on 4/19/18at 08:35;  

Start 4/13/18 at 21:00;  Stop 4/19/18 at 09:00;  Status DC


Lactobacillus Rhamnosus (Culturelle) 1 cap BID PO ;  Start 4/13/18 at 21:00;  

Stop 4/13/18 at 21:00;  Status DC


Lorazepam (Ativan) 0.75 mg HS PO  Last administered on 4/23/18at 19:27;  Start 4 /14/18 at 21:00;  Stop 4/24/18 at 19:26;  Status DC


Lorazepam (Ativan) 1 mg QHS PO  Last administered on 4/13/18at 19:42;  Start 4/ 13/18 at 21:00;  Stop 4/14/18 at 20:30;  Status DC


Fluvoxamine Maleate (Luvox) 25 mg QHS PO  Last administered on 4/16/18at 20:01;

  Start 4/14/18 at 21:00;  Stop 4/16/18 at 23:00;  Status DC


Fluvoxamine Maleate (Luvox) 50 mg HS PO  Last administered on 4/29/18at 19:47;  

Start 4/17/18 at 21:00;  Stop 4/30/18 at 18:57;  Status DC


Olanzapine (ZyPREXA ZYDIS) 2.5 mg PRN Q2HR  PRN PO PSYCHOSIS Last administered 

on 4/26/18at 12:50;  Start 4/16/18 at 18:45


Lorazepam (Ativan) 0.5 mg DAILY PO  Last administered on 4/26/18at 07:50;  

Start 4/18/18 at 09:00;  Stop 4/26/18 at 12:26;  Status DC


Lorazepam (Ativan) 0.75 mg 1600 PO  Last administered on 4/25/18at 17:00;  

Start 4/18/18 at 16:00;  Stop 4/26/18 at 12:26;  Status DC


Quetiapine Fumarate (SEROquel) 12.5 mg 0900,1300,1700 PO  Last administered on 4 /19/18at 16:22;  Start 4/19/18 at 09:00;  Stop 4/19/18 at 18:23;  Status DC


Quetiapine Fumarate (SEROquel) 25 mg 0900,1300,1700 PO  Last administered on 4/ 20/18at 16:52;  Start 4/20/18 at 09:00;  Stop 4/20/18 at 18:14;  Status DC


Vitamin D (Vitamin D3) 50,000 unit WEEKLY PO  Last administered on 4/20/18at 09:

15;  Start 4/20/18 at 09:00


Quetiapine Fumarate (SEROquel) 37.5 mg 0900,1300,1700 PO  Last administered on 4 /23/18at 16:54;  Start 4/21/18 at 09:00;  Stop 4/23/18 at 18:12;  Status DC


Ondansetron HCl (Zofran Odt) 4 mg PRN Q8HRS  PRN PO NAUSEA/VOMITING Last 

administered on 4/25/18at 11:26;  Start 4/22/18 at 15:15


Quetiapine Fumarate (SEROquel) 50 mg 0900,1300,1700 PO  Last administered on 5/2 /18at 17:22;  Start 4/24/18 at 09:00


Acetaminophen (Tylenol) 650 mg PRN Q4HRS  PRN PO PAIN / TEMP Last administered 

on 5/1/18at 19:01;  Start 4/24/18 at 14:15


Hyoscyamine (Anaspaz) 0.125 mg PRN Q4HRS  PRN PO STOMACH CRAMPING Last 

administered on 4/25/18at 14:13;  Start 4/24/18 at 14:15


Lorazepam (Ativan) 0.5 mg HS PO  Last administered on 4/25/18at 19:24;  Start 4/ 25/18 at 21:00;  Stop 4/26/18 at 12:31;  Status DC


Nicotine (Nicoderm Cq 14mg) 1 patch PRN DAILY  PRN TD smoking cessation Last 

administered on 4/30/18at 08:34;  Start 4/26/18 at 09:00


Lorazepam (Ativan) 0.25 mg DAILY PO ;  Start 4/28/18 at 09:00;  Stop 4/28/18 at 

09:00;  Status DC


Lorazepam (Ativan) 0.5 mg 1600 PO  Last administered on 4/26/18at 16:34;  Start 

4/26/18 at 16:00;  Stop 4/27/18 at 19:43;  Status DC


Lorazepam (Ativan) 0.25 mg HS PO ;  Start 5/1/18 at 21:00;  Stop 5/1/18 at 21:00

;  Status DC


Oxcarbazepine (Trileptal) 600 mg DAILY PO  Last administered on 4/27/18at 08:39

;  Start 4/27/18 at 09:00;  Stop 4/27/18 at 19:44;  Status DC


Oxcarbazepine (Trileptal) 450 mg QHS PO  Last administered on 4/26/18at 19:51;  

Start 4/26/18 at 21:00;  Stop 4/27/18 at 19:44;  Status DC


Lorazepam (Ativan) 0.5 mg 0900,1600,2100 PO  Last administered on 5/1/18at 17:31

;  Start 4/27/18 at 21:00;  Stop 5/1/18 at 20:59;  Status DC


Oxcarbazepine (Trileptal) 600 mg BID PO  Last administered on 5/1/18at 09:02;  

Start 4/27/18 at 21:00;  Stop 5/1/18 at 18:51;  Status DC


Lorazepam (Ativan) 0.25 mg QHS PO  Last administered on 5/2/18at 19:41;  Start 5 /1/18 at 21:00


Lorazepam (Ativan) 0.5 mg BID@0900,1600 PO  Last administered on 5/2/18at 17:22

;  Start 5/2/18 at 09:00


Fluvoxamine Maleate (Luvox) 75 mg HS PO  Last administered on 5/2/18at 19:37;  

Start 4/30/18 at 21:00


Oxcarbazepine (Trileptal) 600 mg DAILY PO  Last administered on 5/2/18at 08:20;

  Start 5/2/18 at 09:00


Oxcarbazepine (Trileptal) 450 mg HS PO  Last administered on 5/2/18at 19:38;  

Start 5/1/18 at 21:00


Acetaminophen (Tylenol) 650 mg TID PO  Last administered on 5/2/18at 19:37;  

Start 5/2/18 at 09:00





Active Scripts


Active


Reported


Bactrim Ds Tablet (Sulfamethoxazole/Trimethoprim) 1 Each Tablet 1 Each PO BID


Pantoprazole Sodium 40 Mg Tablet.dr 40 Mg PO DAILY


Trileptal (Oxcarbazepine) 300 Mg Tablet 600 Mg PO BID


Zyprexa (Olanzapine) 5 Mg Tablet 5 Mg PO PRN BID PRN


NICODERM CQ 14mg (Nicotine) 1 Each Patch.td24 1 Patch TD DAILY


Minoxidil 2.5 Mg Tablet 10 Mg PO DAILY


Cozaar (Losartan Potassium) 50 Mg Tablet 50 Mg PO DAILY


Lorazepam 1 Mg Tablet 1 Mg PO HS


Lorazepam 0.5 Mg Tablet 0.75 Mg PO BID94


Lamictal (Lamotrigine) 150 Mg Tablet 150 Mg PO BID


Probiotic (Lactobacillus Acidophilus) 1 Each Capsule 1 Each PO TIDWMEALS


Duoneb 0.5-3(2.5) Mg/3 Ml (Albuterol/Ipratropium) 3 Ml Ampul.neb 3 Ml NEB PRN 

Q4HRS PRN


Fluticasone Propionate Nasal Spray (Fluticasone Propionate) 16 Gm Spray.susp 2 

[place]yk[place] NS DAILY


Escitalopram Oxalate 10 Mg Tablet 10 Mg PO DAILY


Donepezil Hcl 5 Mg Tablet 5 Mg PO HS


Dicyclomine Hcl 10 Mg Capsule 10 Mg PO BID


I have reviewed the current psychotropics carefully including drug 

interactions.  Risk benefit ratio favors no change other than as noted in my 

dictated progress note.





Diagnosis:


Problems:  


(1) Anxiety disorder


(2) Impulse control disorder


(3) Psychotic depression


(4) Major depressive disorder, recurrent episode


(5) Dementia, vascular, with depression


(6) Dementia, vascular, with delusions


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











NEGAR DANIEL MD May 2, 2018 20:53

## 2018-05-02 NOTE — PN
DATE:  04/30/2018



PSYCHIATRIC PROGRESS NOTE



This is a late entry for 04/30/2018, covers elements not covered in my initial

note of 04/30/2018.



SUBJECTIVE:  I met with the patient in the evening.  The patient slept 2-1/4

hours previous evening.  Per nursing report, doing a little better.  The nursing

report indicated the patient is asking that she wants to go home to her parents

and was somewhat delusional regarding nursing staff around lunchtime.



REVIEW OF SYSTEMS:  Ambulation impaired, in wheelchair.  No CV, , pulmonary,

eye system symptoms on review.  Seemed much calmer as I met with her the evening

of 04/30/2018.



MENTAL STATUS EXAM:  Oriented to herself and situation.  Speech, less pressured.

 Abstraction fair, computation impaired, language function intact, attention

span short.  Mood and affect, lability is improved.



LABORATORY DATA:  Reviewed.



IMPRESSION:  Unchanged from initial note.



PLAN:  Luvox is at 50 mg at bedtime.  We will increase to 75 mg at bedtime after

she has been on the 50 mg for 5 days for her OCD symptoms.  Continue rest

unchanged including Trileptal, Lamictal, Seroquel, and the Ativan is being

tapered, maintain Aricept.





______________________________

NEGAR DANIEL MD



DR:  REMY/cherrie  JOB#:  [id number] / [id number]

DD:  05/01/2018 16:22  DT:  05/02/2018 02:53

## 2018-05-02 NOTE — PN
DATE:  05/02/2018



This late entry 05/01/2018 covers elements not covered in my initial note

05/01/2018.  Met with the patient in the evening.  Had telephone call from

Fozia, nurse manager, earlier in the day that family had been calling repeatedly

wondering about discharge plans.  Family will join us for treatment team meeting

on 05/03/2018.  If they have arrangements made for her after care, we will plan

to discharge.  She slept 6-1/4 hours.  EEG was completed compliant with her

medications.  She was more cooperative with the nursing cares as well including

change of catheter.



REVIEW OF SYSTEMS:  Ambulation impaired, in wheelchair.  No CV, , pulmonary,

eye system symptoms on review.



MENTAL STATUS EXAM:  Oriented to herself and situation.  Short term memory is

impaired.  Speech less slurred.  Abstraction fair, computation impaired,

language function intact.  Mood and affect, lability is improved.  Trileptal

level is 44, therapeutic range 10-35 and we will drop the Trileptal from 600 mg

b.i.d. down to 600 in the morning and 450 at night.  Ativan is being tapered. 

Maintain the Rest unchanged.



IMPRESSION:  Unchanged from initial note.





______________________________

MAN IRINEO DANIEL MD



DR:  REMY/cherrie  JOB#:  [id number] / [id number]

DD:  05/02/2018 14:55  DT:  05/02/2018 23:21

## 2018-05-03 VITALS — SYSTOLIC BLOOD PRESSURE: 153 MMHG | DIASTOLIC BLOOD PRESSURE: 64 MMHG

## 2018-05-03 VITALS — DIASTOLIC BLOOD PRESSURE: 60 MMHG | SYSTOLIC BLOOD PRESSURE: 108 MMHG

## 2018-05-03 RX ADMIN — QUETIAPINE FUMARATE SCH MG: 50 TABLET, FILM COATED ORAL at 16:18

## 2018-05-03 RX ADMIN — Medication SCH CAP: at 19:15

## 2018-05-03 RX ADMIN — LAMOTRIGINE SCH MG: 150 TABLET ORAL at 19:15

## 2018-05-03 RX ADMIN — LOSARTAN POTASSIUM SCH MG: 50 TABLET, FILM COATED ORAL at 08:21

## 2018-05-03 RX ADMIN — ACETAMINOPHEN SCH MG: 325 TABLET, FILM COATED ORAL at 08:22

## 2018-05-03 RX ADMIN — DICYCLOMINE HYDROCHLORIDE SCH MG: 10 CAPSULE ORAL at 19:15

## 2018-05-03 RX ADMIN — MINOXIDIL SCH MG: 2.5 TABLET ORAL at 08:24

## 2018-05-03 RX ADMIN — ACETAMINOPHEN SCH MG: 325 TABLET, FILM COATED ORAL at 19:15

## 2018-05-03 RX ADMIN — DONEPEZIL HYDROCHLORIDE SCH MG: 5 TABLET, FILM COATED ORAL at 19:15

## 2018-05-03 RX ADMIN — DICYCLOMINE HYDROCHLORIDE SCH MG: 10 CAPSULE ORAL at 08:20

## 2018-05-03 RX ADMIN — QUETIAPINE FUMARATE SCH MG: 50 TABLET, FILM COATED ORAL at 14:12

## 2018-05-03 RX ADMIN — PANTOPRAZOLE SODIUM SCH MG: 40 TABLET, DELAYED RELEASE ORAL at 08:21

## 2018-05-03 RX ADMIN — FLUTICASONE PROPIONATE SCH SPRAY: 50 SPRAY, METERED NASAL at 08:24

## 2018-05-03 RX ADMIN — ACETAMINOPHEN SCH MG: 325 TABLET, FILM COATED ORAL at 14:13

## 2018-05-03 RX ADMIN — NICOTINE PRN PATCH: 14 PATCH, EXTENDED RELEASE TOPICAL at 08:43

## 2018-05-03 RX ADMIN — Medication SCH CAP: at 08:21

## 2018-05-03 RX ADMIN — LAMOTRIGINE SCH MG: 150 TABLET ORAL at 08:22

## 2018-05-03 RX ADMIN — QUETIAPINE FUMARATE SCH MG: 50 TABLET, FILM COATED ORAL at 08:21

## 2018-05-03 NOTE — RAD
INDICATION: ABDOMINAL PAIN AND DISTENSION, LETHARGIC, HOLDS ABDOMEN AND 

MOANS.

 

COMPARISON: None.

 

TECHNIQUE:

 

Axial CT images obtained through the abdomen and pelvis without contrast. 

Limited assessment of solid organ structures and vasculature secondary to 

lack of intravenous contrast.

 

One or more of the following individualized dose reduction techniques were

utilized for this examination:  1. Automated exposure control;  2. 

Adjustment of the mA and/or kV according to patient size;  3. Use of 

iterative reconstruction technique.

 

FINDINGS:

 

There is some regions of nodularity at the lung bases. Linear opacity left

lung base could be atelectasis or scarring.

Calcification within the left breast.

Severe calcific atherosclerosis.

No intrahepatic bile duct dilation.

Poor evaluation of the pancreas without contrast.

Spleen is not grossly enlarged.

No definite left-sided hydronephrosis.

No definite right-sided hydronephrosis.

Suprapubic catheter within the urinary bladder which is decompressed.

Moderate stool throughout the colon.

There is some distention of the stomach.

Appendix is not well seen

 

IMPRESSION:

 

Moderate amount of stool is seen throughout the colon.

 

No definite hydronephrosis.

 

Severe calcific atherosclerosis.

 

There is some regions of nodularity at the lung bases. Could be infectious

or inflammatory in nature but may be helpful to obtain a follow-up in a 

few months to ensure no increase to exclude neoplastic causes.

 

The appendix is not well seen on this noncontrast examination which limits

evaluation. If there is any clinical concern for an abnormality of the 

solid organs, vasculature or appendicitis follow-up postcontrast exam 

could be obtained with intravenous and oral contrast.

 

There are some sclerotic foci in the osseous structures. Most commonly 

from bone island unless the patient has a known history of neoplasm.

 

There is some distention of the stomach with intraluminal content.

 

Electronically signed by: Abner Nolasco MD (5/3/2018 12:38 AM) [facility]

## 2018-05-03 NOTE — PDOC
Exam


Note:


Sanchez Note:


Please also refer to the separate dictated note~for this date of service 

dictated separately.~Patient seen individually. Discussed the patient with 

Nursing staff reviewed the chart.~Reviewed interim history and current 

functioning. Reviewed vital signs,~Labs/ Radiology~and current medications 

noted below. Continue current treatment with the changes noted in the dictated 

addendum note





Assessment:


Vital Signs:





 Vital Signs








  Date Time  Temp Pulse Resp B/P (MAP) Pulse Ox O2 Delivery O2 Flow Rate FiO2


 


5/3/18 16:24 97.9 89 18 108/60 (76) 97   


 


5/1/18 06:24      Room Air  








I&O











Intake and Output 


 


 5/3/18





 07:00


 


Intake Total 960 ml


 


Output Total 1355 ml


 


Balance -395 ml


 


 


 


Intake Oral 960 ml


 


Output Urine Total 1355 ml


 


# Bowel Movements 1








Labs:





 Laboratory Tests








Test


  5/2/18


21:33 5/2/18


22:00


 


White Blood Count


  5.2 x10^3/uL


(4.0-11.0) 


 


 


Red Blood Count


  2.97 x10^6/uL


(3.50-5.40)  L 


 


 


Hemoglobin


  9.7 g/dL


(12.0-15.5)  L 


 


 


Hematocrit


  28.6 %


(36.0-47.0)  L 


 


 


Mean Corpuscular Volume


  97 fL ()


  


 


 


Mean Corpuscular Hemoglobin 33 pg (25-35)   


 


Mean Corpuscular Hemoglobin


Concent 34 g/dL


(31-37) 


 


 


Red Cell Distribution Width


  16.8 %


(11.5-14.5)  H 


 


 


Platelet Count


  332 x10^3/uL


(140-400) 


 


 


Neutrophils (%) (Auto) 48 % (31-73)   


 


Lymphocytes (%) (Auto) 36 % (24-48)   


 


Monocytes (%) (Auto) 14 % (0-9)  H 


 


Eosinophils (%) (Auto) 2 % (0-3)   


 


Basophils (%) (Auto) 1 % (0-3)   


 


Neutrophils # (Auto)


  2.5 x10^3uL


(1.8-7.7) 


 


 


Lymphocytes # (Auto)


  1.9 x10^3/uL


(1.0-4.8) 


 


 


Monocytes # (Auto)


  0.7 x10^3/uL


(0.0-1.1) 


 


 


Eosinophils # (Auto)


  0.1 x10^3/uL


(0.0-0.7) 


 


 


Basophils # (Auto)


  0.0 x10^3/uL


(0.0-0.2) 


 


 


Sodium Level


  136 mmol/L


(136-145) 


 


 


Potassium Level


  4.1 mmol/L


(3.5-5.1) 


 


 


Chloride Level


  100 mmol/L


() 


 


 


Carbon Dioxide Level


  28 mmol/L


(21-32) 


 


 


Anion Gap 8 (6-14)   


 


Blood Urea Nitrogen


  36 mg/dL


(7-20)  H 


 


 


Creatinine


  0.9 mg/dL


(0.6-1.0) 


 


 


Estimated GFR


(Cockcroft-Gault) 61.7  


  


 


 


BUN/Creatinine Ratio 40 (6-20)  H 


 


Glucose Level


  114 mg/dL


(70-99)  H 


 


 


Lactic Acid Level


  1.3 mmol/L


(0.4-2.0) 


 


 


Calcium Level


  8.8 mg/dL


(8.5-10.1) 


 


 


Total Bilirubin


  0.1 mg/dL


(0.2-1.0)  L 


 


 


Aspartate Amino Transferase


(AST) 15 U/L (15-37)


  


 


 


Alanine Aminotransferase (ALT)


  20 U/L (14-59)


  


 


 


Alkaline Phosphatase


  110 U/L


() 


 


 


Total Protein


  6.1 g/dL


(6.4-8.2)  L 


 


 


Albumin


  3.1 g/dL


(3.4-5.0)  L 


 


 


Albumin/Globulin Ratio 1.0 (1.0-1.7)   


 


Blood pH


  


  7.45


(7.35-7.45)


 


Blood Gas PCO2


  


  43 mmHg


(35-45)


 


Blood Gas PO2


  


  74 mmHg


()


 


Blood Gas HCO3


  


  30 mmol/L


(22-26)  H


 


Arterial Bld O2 Saturation


(Calc) 


  95 % (92-99)  


 


 


FiO2  21 %  











Current Medications:


Meds:





Current Medications


Acetaminophen (Tylenol) 650 mg PRN Q6HRS  PRN PO PAIN / TEMP Last administered 

on 4/24/18at 08:26;  Start 4/11/18 at 19:30;  Stop 4/24/18 at 14:04;  Status DC


Multi-Ingredient Ointment (Analgesic Balm) 1 logan PRN QID  PRN TP MUSCLE PAIN 

Last administered on 4/27/18at 02:09;  Start 4/11/18 at 19:30


Al Hydroxide/Mg Hydroxide (Mylanta Plus Xs) 15 ml PRN AFTMEALHC  PRN PO 

DYSPEPSIA;  Start 4/11/18 at 19:30


Magnesium Hydroxide (Milk Of Magnesia) 2,400 mg PRN QHS  PRN PO CONSTIPATION;  

Start 4/11/18 at 19:30


Donepezil HCl (Aricept) 5 mg HS PO  Last administered on 5/3/18at 19:15;  Start 

4/11/18 at 23:00


Lorazepam (Ativan) 0.75 mg BID94 PO  Last administered on 4/17/18at 16:07;  

Start 4/12/18 at 09:00;  Stop 4/17/18 at 18:34;  Status DC


Lorazepam (Ativan) 1 mg HS PO  Last administered on 4/12/18at 20:04;  Start 4/11 /18 at 23:00;  Stop 4/13/18 at 18:25;  Status DC


Olanzapine (ZyPREXA) 5 mg PRN BID  PRN PO ANXIETY / AGITATION Last administered 

on 4/16/18at 13:32;  Start 4/11/18 at 22:00;  Stop 4/16/18 at 18:40;  Status DC


Dicyclomine HCl (Bentyl) 10 mg BID PO  Last administered on 5/3/18at 19:15;  

Start 4/11/18 at 23:00


Fluticasone Propionate (Flonase) 2 spray DAILY NS  Last administered on 5/3/

18at 08:24;  Start 4/12/18 at 09:00


Albuterol/ Ipratropium (Duoneb) 3 ml PRN Q4HRS  PRN NEB SHORTNESS OF BREATH;  

Start 4/11/18 at 22:00


Lamotrigine (LaMICtal) 150 mg BID PO  Last administered on 5/3/18at 19:15;  

Start 4/11/18 at 23:00


Losartan Potassium (Cozaar) 50 mg DAILY PO  Last administered on 5/3/18at 08:21

;  Start 4/12/18 at 09:00


Minoxidil (Loniten) 10 mg DAILY PO  Last administered on 5/3/18at 08:24;  Start 

4/12/18 at 09:00


Nicotine (Nicoderm Cq 14mg) 1 patch DAILY TD  Last administered on 4/22/18at 08:

16;  Start 4/12/18 at 09:00;  Stop 4/25/18 at 09:03;  Status DC


Oxcarbazepine (Trileptal) 600 mg BID PO  Last administered on 4/26/18at 07:48;  

Start 4/12/18 at 09:00;  Stop 4/26/18 at 19:36;  Status DC


Pantoprazole Sodium (Protonix) 40 mg DAILY PO  Last administered on 5/3/18at 08:

21;  Start 4/12/18 at 09:00


Trimethoprim/ Sulfamethoxazole (Bactrim Ds) 1 tab BID PO  Last administered on 4 /13/18at 09:00;  Start 4/11/18 at 23:00;  Stop 4/13/18 at 16:19;  Status DC


Citalopram Hydrobromide (CeleXA) 20 mg DAILY PO  Last administered on 4/14/18at 

08:39;  Start 4/12/18 at 09:00;  Stop 4/14/18 at 18:32;  Status DC


Lactobacillus Rhamnosus (Culturelle) 1 cap BID PO  Last administered on 5/3/

18at 19:15;  Start 4/12/18 at 09:00


Amoxicillin (Amoxil) 500 mg [id number] PO  Last administered on 4/19/18at 08:35;  

Start 4/13/18 at 21:00;  Stop 4/19/18 at 09:00;  Status DC


Ciprofloxacin (Cipro) 250 mg BID PO  Last administered on 4/19/18at 08:35;  

Start 4/13/18 at 21:00;  Stop 4/19/18 at 09:00;  Status DC


Lactobacillus Rhamnosus (Culturelle) 1 cap BID PO ;  Start 4/13/18 at 21:00;  

Stop 4/13/18 at 21:00;  Status DC


Lorazepam (Ativan) 0.75 mg HS PO  Last administered on 4/23/18at 19:27;  Start 4 /14/18 at 21:00;  Stop 4/24/18 at 19:26;  Status DC


Lorazepam (Ativan) 1 mg QHS PO  Last administered on 4/13/18at 19:42;  Start 4/ 13/18 at 21:00;  Stop 4/14/18 at 20:30;  Status DC


Fluvoxamine Maleate (Luvox) 25 mg QHS PO  Last administered on 4/16/18at 20:01;

  Start 4/14/18 at 21:00;  Stop 4/16/18 at 23:00;  Status DC


Fluvoxamine Maleate (Luvox) 50 mg HS PO  Last administered on 4/29/18at 19:47;  

Start 4/17/18 at 21:00;  Stop 4/30/18 at 18:57;  Status DC


Olanzapine (ZyPREXA ZYDIS) 2.5 mg PRN Q2HR  PRN PO PSYCHOSIS Last administered 

on 4/26/18at 12:50;  Start 4/16/18 at 18:45


Lorazepam (Ativan) 0.5 mg DAILY PO  Last administered on 4/26/18at 07:50;  

Start 4/18/18 at 09:00;  Stop 4/26/18 at 12:26;  Status DC


Lorazepam (Ativan) 0.75 mg 1600 PO  Last administered on 4/25/18at 17:00;  

Start 4/18/18 at 16:00;  Stop 4/26/18 at 12:26;  Status DC


Quetiapine Fumarate (SEROquel) 12.5 mg 0900,1300,1700 PO  Last administered on 4 /19/18at 16:22;  Start 4/19/18 at 09:00;  Stop 4/19/18 at 18:23;  Status DC


Quetiapine Fumarate (SEROquel) 25 mg 0900,1300,1700 PO  Last administered on 4/ 20/18at 16:52;  Start 4/20/18 at 09:00;  Stop 4/20/18 at 18:14;  Status DC


Vitamin D (Vitamin D3) 50,000 unit WEEKLY PO  Last administered on 4/20/18at 09:

15;  Start 4/20/18 at 09:00


Quetiapine Fumarate (SEROquel) 37.5 mg 0900,1300,1700 PO  Last administered on 4 /23/18at 16:54;  Start 4/21/18 at 09:00;  Stop 4/23/18 at 18:12;  Status DC


Ondansetron HCl (Zofran Odt) 4 mg PRN Q8HRS  PRN PO NAUSEA/VOMITING Last 

administered on 4/25/18at 11:26;  Start 4/22/18 at 15:15


Quetiapine Fumarate (SEROquel) 50 mg 0900,1300,1700 PO  Last administered on 5/3

/18at 16:18;  Start 4/24/18 at 09:00


Acetaminophen (Tylenol) 650 mg PRN Q4HRS  PRN PO PAIN / TEMP Last administered 

on 5/1/18at 19:01;  Start 4/24/18 at 14:15


Hyoscyamine (Anaspaz) 0.125 mg PRN Q4HRS  PRN PO STOMACH CRAMPING Last 

administered on 4/25/18at 14:13;  Start 4/24/18 at 14:15


Lorazepam (Ativan) 0.5 mg HS PO  Last administered on 4/25/18at 19:24;  Start 4/ 25/18 at 21:00;  Stop 4/26/18 at 12:31;  Status DC


Nicotine (Nicoderm Cq 14mg) 1 patch PRN DAILY  PRN TD smoking cessation Last 

administered on 5/3/18at 08:43;  Start 4/26/18 at 09:00


Lorazepam (Ativan) 0.25 mg DAILY PO ;  Start 4/28/18 at 09:00;  Stop 4/28/18 at 

09:00;  Status DC


Lorazepam (Ativan) 0.5 mg 1600 PO  Last administered on 4/26/18at 16:34;  Start 

4/26/18 at 16:00;  Stop 4/27/18 at 19:43;  Status DC


Lorazepam (Ativan) 0.25 mg HS PO ;  Start 5/1/18 at 21:00;  Stop 5/1/18 at 21:00

;  Status DC


Oxcarbazepine (Trileptal) 600 mg DAILY PO  Last administered on 4/27/18at 08:39

;  Start 4/27/18 at 09:00;  Stop 4/27/18 at 19:44;  Status DC


Oxcarbazepine (Trileptal) 450 mg QHS PO  Last administered on 4/26/18at 19:51;  

Start 4/26/18 at 21:00;  Stop 4/27/18 at 19:44;  Status DC


Lorazepam (Ativan) 0.5 mg 0900,1600,2100 PO  Last administered on 5/1/18at 17:31

;  Start 4/27/18 at 21:00;  Stop 5/1/18 at 20:59;  Status DC


Oxcarbazepine (Trileptal) 600 mg BID PO  Last administered on 5/1/18at 09:02;  

Start 4/27/18 at 21:00;  Stop 5/1/18 at 18:51;  Status DC


Lorazepam (Ativan) 0.25 mg QHS PO  Last administered on 5/3/18at 19:18;  Start 5 /1/18 at 21:00


Lorazepam (Ativan) 0.5 mg BID@0900,1600 PO  Last administered on 5/3/18at 16:18

;  Start 5/2/18 at 09:00


Fluvoxamine Maleate (Luvox) 75 mg HS PO  Last administered on 5/3/18at 19:15;  

Start 4/30/18 at 21:00


Oxcarbazepine (Trileptal) 600 mg DAILY PO  Last administered on 5/3/18at 08:21;

  Start 5/2/18 at 09:00


Oxcarbazepine (Trileptal) 450 mg HS PO  Last administered on 5/3/18at 19:15;  

Start 5/1/18 at 21:00


Acetaminophen (Tylenol) 650 mg TID PO  Last administered on 5/3/18at 19:15;  

Start 5/2/18 at 09:00


Polyethylene Glycol (miraLAX) 17 gm PRN DAILY  PRN PO CONSTIPATION;  Start 5/3/

18 at 02:15


Docusate Sodium (Colace) 100 mg PRN DAILY  PRN PO CONSTIPATION;  Start 5/3/18 

at 02:15





Active Scripts


Active


Reported


Bactrim Ds Tablet (Sulfamethoxazole/Trimethoprim) 1 Each Tablet 1 Each PO BID


Pantoprazole Sodium 40 Mg Tablet.dr 40 Mg PO DAILY


Trileptal (Oxcarbazepine) 300 Mg Tablet 600 Mg PO BID


Zyprexa (Olanzapine) 5 Mg Tablet 5 Mg PO PRN BID PRN


NICODERM CQ 14mg (Nicotine) 1 Each Patch.td24 1 Patch TD DAILY


Minoxidil 2.5 Mg Tablet 10 Mg PO DAILY


Cozaar (Losartan Potassium) 50 Mg Tablet 50 Mg PO DAILY


Lorazepam 1 Mg Tablet 1 Mg PO HS


Lorazepam 0.5 Mg Tablet 0.75 Mg PO BID94


Lamictal (Lamotrigine) 150 Mg Tablet 150 Mg PO BID


Probiotic (Lactobacillus Acidophilus) 1 Each Capsule 1 Each PO TIDWMEALS


Duoneb 0.5-3(2.5) Mg/3 Ml (Albuterol/Ipratropium) 3 Ml Ampul.neb 3 Ml NEB PRN 

Q4HRS PRN


Fluticasone Propionate Nasal Spray (Fluticasone Propionate) 16 Gm Spray.susp 2 

[place] NS DAILY


Escitalopram Oxalate 10 Mg Tablet 10 Mg PO DAILY


Donepezil Hcl 5 Mg Tablet 5 Mg PO HS


Dicyclomine Hcl 10 Mg Capsule 10 Mg PO BID


I have reviewed the current psychotropics carefully including drug 

interactions.  Risk benefit ratio favors no change other than as noted in my 

dictated progress note.





Diagnosis:


Problems:  


(1) Anxiety disorder


(2) Impulse control disorder


(3) Psychotic depression


(4) Major depressive disorder, recurrent episode


(5) Dementia, vascular, with depression


(6) Dementia, vascular, with delusions


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











NEGAR DANIEL MD May 3, 2018 21:03

## 2018-05-04 VITALS — DIASTOLIC BLOOD PRESSURE: 72 MMHG | SYSTOLIC BLOOD PRESSURE: 141 MMHG

## 2018-05-04 VITALS — SYSTOLIC BLOOD PRESSURE: 132 MMHG | DIASTOLIC BLOOD PRESSURE: 59 MMHG

## 2018-05-04 RX ADMIN — QUETIAPINE FUMARATE SCH MG: 50 TABLET, FILM COATED ORAL at 07:54

## 2018-05-04 RX ADMIN — LAMOTRIGINE SCH MG: 150 TABLET ORAL at 07:54

## 2018-05-04 RX ADMIN — ACETAMINOPHEN SCH MG: 325 TABLET, FILM COATED ORAL at 16:35

## 2018-05-04 RX ADMIN — ACETAMINOPHEN SCH MG: 325 TABLET, FILM COATED ORAL at 20:44

## 2018-05-04 RX ADMIN — DONEPEZIL HYDROCHLORIDE SCH MG: 5 TABLET, FILM COATED ORAL at 19:18

## 2018-05-04 RX ADMIN — ACETAMINOPHEN SCH MG: 325 TABLET, FILM COATED ORAL at 07:54

## 2018-05-04 RX ADMIN — DICYCLOMINE HYDROCHLORIDE SCH MG: 10 CAPSULE ORAL at 07:57

## 2018-05-04 RX ADMIN — FLUTICASONE PROPIONATE SCH SPRAY: 50 SPRAY, METERED NASAL at 07:58

## 2018-05-04 RX ADMIN — LOSARTAN POTASSIUM SCH MG: 50 TABLET, FILM COATED ORAL at 07:55

## 2018-05-04 RX ADMIN — QUETIAPINE FUMARATE SCH MG: 50 TABLET, FILM COATED ORAL at 16:35

## 2018-05-04 RX ADMIN — ACETAMINOPHEN SCH MG: 325 TABLET, FILM COATED ORAL at 12:11

## 2018-05-04 RX ADMIN — PANTOPRAZOLE SODIUM SCH MG: 40 TABLET, DELAYED RELEASE ORAL at 07:55

## 2018-05-04 RX ADMIN — Medication SCH CAP: at 07:54

## 2018-05-04 RX ADMIN — QUETIAPINE FUMARATE SCH MG: 50 TABLET, FILM COATED ORAL at 12:11

## 2018-05-04 RX ADMIN — LAMOTRIGINE SCH MG: 150 TABLET ORAL at 19:19

## 2018-05-04 RX ADMIN — ACETAMINOPHEN PRN MG: 325 TABLET, FILM COATED ORAL at 06:25

## 2018-05-04 RX ADMIN — Medication SCH CAP: at 19:19

## 2018-05-04 RX ADMIN — CHOLECALCIFEROL CAP 1.25 MG (50000 UNIT) SCH UNIT: 1.25 CAP at 07:57

## 2018-05-04 RX ADMIN — MINOXIDIL SCH MG: 2.5 TABLET ORAL at 08:00

## 2018-05-04 RX ADMIN — DICYCLOMINE HYDROCHLORIDE SCH MG: 10 CAPSULE ORAL at 19:19

## 2018-05-04 NOTE — PDOC
Exam


Note:


Sanchez Note:


Please also refer to the separate dictated note~for this date of service 

dictated separately.~Patient seen individually. Discussed the patient with 

Nursing staff reviewed the chart.~Reviewed interim history and current 

functioning. Reviewed vital signs,~Labs/ Radiology~and current medications 

noted below. Continue current treatment with the changes noted in the dictated 

addendum note





Assessment:


Vital Signs:





 Vital Signs








  Date Time  Temp Pulse Resp B/P (MAP) Pulse Ox O2 Delivery O2 Flow Rate FiO2


 


5/4/18 15:56 98.2 84 16 132/59 (83) 96   


 


5/1/18 06:24      Room Air  








I&O











Intake and Output 


 


 5/4/18





 07:00


 


Intake Total 600 ml


 


Output Total 420 ml


 


Balance 180 ml


 


 


 


Intake Oral 600 ml


 


Output Urine Total 420 ml


 


# Bowel Movements 2











Current Medications:


Meds:





Current Medications


Acetaminophen (Tylenol) 650 mg PRN Q6HRS  PRN PO PAIN / TEMP Last administered 

on 4/24/18at 08:26;  Start 4/11/18 at 19:30;  Stop 4/24/18 at 14:04;  Status DC


Multi-Ingredient Ointment (Analgesic Balm) 1 logan PRN QID  PRN TP MUSCLE PAIN 

Last administered on 4/27/18at 02:09;  Start 4/11/18 at 19:30


Al Hydroxide/Mg Hydroxide (Mylanta Plus Xs) 15 ml PRN AFTMEALHC  PRN PO 

DYSPEPSIA;  Start 4/11/18 at 19:30


Magnesium Hydroxide (Milk Of Magnesia) 2,400 mg PRN QHS  PRN PO CONSTIPATION;  

Start 4/11/18 at 19:30


Donepezil HCl (Aricept) 5 mg HS PO  Last administered on 5/4/18at 19:18;  Start 

4/11/18 at 23:00


Lorazepam (Ativan) 0.75 mg BID94 PO  Last administered on 4/17/18at 16:07;  

Start 4/12/18 at 09:00;  Stop 4/17/18 at 18:34;  Status DC


Lorazepam (Ativan) 1 mg HS PO  Last administered on 4/12/18at 20:04;  Start 4/11 /18 at 23:00;  Stop 4/13/18 at 18:25;  Status DC


Olanzapine (ZyPREXA) 5 mg PRN BID  PRN PO ANXIETY / AGITATION Last administered 

on 4/16/18at 13:32;  Start 4/11/18 at 22:00;  Stop 4/16/18 at 18:40;  Status DC


Dicyclomine HCl (Bentyl) 10 mg BID PO  Last administered on 5/4/18at 19:19;  

Start 4/11/18 at 23:00


Fluticasone Propionate (Flonase) 2 spray DAILY NS  Last administered on 5/4/ 18at 07:58;  Start 4/12/18 at 09:00


Albuterol/ Ipratropium (Duoneb) 3 ml PRN Q4HRS  PRN NEB SHORTNESS OF BREATH;  

Start 4/11/18 at 22:00


Lamotrigine (LaMICtal) 150 mg BID PO  Last administered on 5/4/18at 19:19;  

Start 4/11/18 at 23:00


Losartan Potassium (Cozaar) 50 mg DAILY PO  Last administered on 5/4/18at 07:55

;  Start 4/12/18 at 09:00


Minoxidil (Loniten) 10 mg DAILY PO  Last administered on 5/4/18at 08:00;  Start 

4/12/18 at 09:00


Nicotine (Nicoderm Cq 14mg) 1 patch DAILY TD  Last administered on 4/22/18at 08:

16;  Start 4/12/18 at 09:00;  Stop 4/25/18 at 09:03;  Status DC


Oxcarbazepine (Trileptal) 600 mg BID PO  Last administered on 4/26/18at 07:48;  

Start 4/12/18 at 09:00;  Stop 4/26/18 at 19:36;  Status DC


Pantoprazole Sodium (Protonix) 40 mg DAILY PO  Last administered on 5/4/18at 07:

55;  Start 4/12/18 at 09:00


Trimethoprim/ Sulfamethoxazole (Bactrim Ds) 1 tab BID PO  Last administered on 4 /13/18at 09:00;  Start 4/11/18 at 23:00;  Stop 4/13/18 at 16:19;  Status DC


Citalopram Hydrobromide (CeleXA) 20 mg DAILY PO  Last administered on 4/14/18at 

08:39;  Start 4/12/18 at 09:00;  Stop 4/14/18 at 18:32;  Status DC


Lactobacillus Rhamnosus (Culturelle) 1 cap BID PO  Last administered on 5/4/ 18at 19:19;  Start 4/12/18 at 09:00


Amoxicillin (Amoxil) 500 mg [id number] PO  Last administered on 4/19/18at 08:35;  

Start 4/13/18 at 21:00;  Stop 4/19/18 at 09:00;  Status DC


Ciprofloxacin (Cipro) 250 mg BID PO  Last administered on 4/19/18at 08:35;  

Start 4/13/18 at 21:00;  Stop 4/19/18 at 09:00;  Status DC


Lactobacillus Rhamnosus (Culturelle) 1 cap BID PO ;  Start 4/13/18 at 21:00;  

Stop 4/13/18 at 21:00;  Status DC


Lorazepam (Ativan) 0.75 mg HS PO  Last administered on 4/23/18at 19:27;  Start 4 /14/18 at 21:00;  Stop 4/24/18 at 19:26;  Status DC


Lorazepam (Ativan) 1 mg QHS PO  Last administered on 4/13/18at 19:42;  Start 4/ 13/18 at 21:00;  Stop 4/14/18 at 20:30;  Status DC


Fluvoxamine Maleate (Luvox) 25 mg QHS PO  Last administered on 4/16/18at 20:01;

  Start 4/14/18 at 21:00;  Stop 4/16/18 at 23:00;  Status DC


Fluvoxamine Maleate (Luvox) 50 mg HS PO  Last administered on 4/29/18at 19:47;  

Start 4/17/18 at 21:00;  Stop 4/30/18 at 18:57;  Status DC


Olanzapine (ZyPREXA ZYDIS) 2.5 mg PRN Q2HR  PRN PO PSYCHOSIS Last administered 

on 4/26/18at 12:50;  Start 4/16/18 at 18:45


Lorazepam (Ativan) 0.5 mg DAILY PO  Last administered on 4/26/18at 07:50;  

Start 4/18/18 at 09:00;  Stop 4/26/18 at 12:26;  Status DC


Lorazepam (Ativan) 0.75 mg 1600 PO  Last administered on 4/25/18at 17:00;  

Start 4/18/18 at 16:00;  Stop 4/26/18 at 12:26;  Status DC


Quetiapine Fumarate (SEROquel) 12.5 mg 0900,1300,1700 PO  Last administered on 4 /19/18at 16:22;  Start 4/19/18 at 09:00;  Stop 4/19/18 at 18:23;  Status DC


Quetiapine Fumarate (SEROquel) 25 mg 0900,1300,1700 PO  Last administered on 4/ 20/18at 16:52;  Start 4/20/18 at 09:00;  Stop 4/20/18 at 18:14;  Status DC


Vitamin D (Vitamin D3) 50,000 unit WEEKLY PO  Last administered on 5/4/18at 07:

57;  Start 4/20/18 at 09:00


Quetiapine Fumarate (SEROquel) 37.5 mg 0900,1300,1700 PO  Last administered on 4 /23/18at 16:54;  Start 4/21/18 at 09:00;  Stop 4/23/18 at 18:12;  Status DC


Ondansetron HCl (Zofran Odt) 4 mg PRN Q8HRS  PRN PO NAUSEA/VOMITING Last 

administered on 4/25/18at 11:26;  Start 4/22/18 at 15:15


Quetiapine Fumarate (SEROquel) 50 mg 0900,1300,1700 PO  Last administered on 5/4 /18at 16:35;  Start 4/24/18 at 09:00


Acetaminophen (Tylenol) 650 mg PRN Q4HRS  PRN PO PAIN / TEMP Last administered 

on 5/4/18at 06:25;  Start 4/24/18 at 14:15


Hyoscyamine (Anaspaz) 0.125 mg PRN Q4HRS  PRN PO STOMACH CRAMPING Last 

administered on 4/25/18at 14:13;  Start 4/24/18 at 14:15


Lorazepam (Ativan) 0.5 mg HS PO  Last administered on 4/25/18at 19:24;  Start 4/ 25/18 at 21:00;  Stop 4/26/18 at 12:31;  Status DC


Nicotine (Nicoderm Cq 14mg) 1 patch PRN DAILY  PRN TD smoking cessation Last 

administered on 5/3/18at 08:43;  Start 4/26/18 at 09:00


Lorazepam (Ativan) 0.25 mg DAILY PO ;  Start 4/28/18 at 09:00;  Stop 4/28/18 at 

09:00;  Status DC


Lorazepam (Ativan) 0.5 mg 1600 PO  Last administered on 4/26/18at 16:34;  Start 

4/26/18 at 16:00;  Stop 4/27/18 at 19:43;  Status DC


Lorazepam (Ativan) 0.25 mg HS PO ;  Start 5/1/18 at 21:00;  Stop 5/1/18 at 21:00

;  Status DC


Oxcarbazepine (Trileptal) 600 mg DAILY PO  Last administered on 4/27/18at 08:39

;  Start 4/27/18 at 09:00;  Stop 4/27/18 at 19:44;  Status DC


Oxcarbazepine (Trileptal) 450 mg QHS PO  Last administered on 4/26/18at 19:51;  

Start 4/26/18 at 21:00;  Stop 4/27/18 at 19:44;  Status DC


Lorazepam (Ativan) 0.5 mg 0900,1600,2100 PO  Last administered on 5/1/18at 17:31

;  Start 4/27/18 at 21:00;  Stop 5/1/18 at 20:59;  Status DC


Oxcarbazepine (Trileptal) 600 mg BID PO  Last administered on 5/1/18at 09:02;  

Start 4/27/18 at 21:00;  Stop 5/1/18 at 18:51;  Status DC


Lorazepam (Ativan) 0.25 mg QHS PO  Last administered on 5/4/18at 19:23;  Start 5 /1/18 at 21:00


Lorazepam (Ativan) 0.5 mg BID@0900,1600 PO  Last administered on 5/4/18at 16:35

;  Start 5/2/18 at 09:00


Fluvoxamine Maleate (Luvox) 75 mg HS PO  Last administered on 5/4/18at 19:19;  

Start 4/30/18 at 21:00


Oxcarbazepine (Trileptal) 600 mg DAILY PO  Last administered on 5/4/18at 07:55;

  Start 5/2/18 at 09:00


Oxcarbazepine (Trileptal) 450 mg HS PO  Last administered on 5/4/18at 19:19;  

Start 5/1/18 at 21:00


Acetaminophen (Tylenol) 650 mg TID PO  Last administered on 5/4/18at 20:44;  

Start 5/2/18 at 09:00


Polyethylene Glycol (miraLAX) 17 gm PRN DAILY  PRN PO CONSTIPATION;  Start 5/3/

18 at 02:15


Docusate Sodium (Colace) 100 mg PRN DAILY  PRN PO CONSTIPATION;  Start 5/3/18 

at 02:15


Throat Lozenges (Cepacol Sore Throat Lozenge) 1 brianna PRN Q2HR  PRN PO SORE THROAT

;  Start 5/4/18 at 18:45





Active Scripts


Active


Reported


Bactrim Ds Tablet (Sulfamethoxazole/Trimethoprim) 1 Each Tablet 1 Each PO BID


Pantoprazole Sodium 40 Mg Tablet.dr 40 Mg PO DAILY


Trileptal (Oxcarbazepine) 300 Mg Tablet 600 Mg PO BID


Zyprexa (Olanzapine) 5 Mg Tablet 5 Mg PO PRN BID PRN


NICODERM CQ 14mg (Nicotine) 1 Each Patch.td24 1 Patch TD DAILY


Minoxidil 2.5 Mg Tablet 10 Mg PO DAILY


Cozaar (Losartan Potassium) 50 Mg Tablet 50 Mg PO DAILY


Lorazepam 1 Mg Tablet 1 Mg PO HS


Lorazepam 0.5 Mg Tablet 0.75 Mg PO BID94


Lamictal (Lamotrigine) 150 Mg Tablet 150 Mg PO BID


Probiotic (Lactobacillus Acidophilus) 1 Each Capsule 1 Each PO TIDWMEALS


Duoneb 0.5-3(2.5) Mg/3 Ml (Albuterol/Ipratropium) 3 Ml Ampul.neb 3 Ml NEB PRN 

Q4HRS PRN


Fluticasone Propionate Nasal Spray (Fluticasone Propionate) 16 Gm Spray.susp 2 

[place] NS DAILY


Escitalopram Oxalate 10 Mg Tablet 10 Mg PO DAILY


Donepezil Hcl 5 Mg Tablet 5 Mg PO HS


Dicyclomine Hcl 10 Mg Capsule 10 Mg PO BID


I have reviewed the current psychotropics carefully including drug 

interactions.  Risk benefit ratio favors no change other than as noted in my 

dictated progress note.





Diagnosis:


Problems:  


(1) Anxiety disorder


(2) Impulse control disorder


(3) Psychotic depression


(4) Major depressive disorder, recurrent episode


(5) Dementia, vascular, with depression


(6) Dementia, vascular, with delusions


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











NEGAR DANIEL MD May 4, 2018 21:05

## 2018-05-05 VITALS — SYSTOLIC BLOOD PRESSURE: 132 MMHG | DIASTOLIC BLOOD PRESSURE: 59 MMHG

## 2018-05-05 RX ADMIN — MINOXIDIL SCH MG: 2.5 TABLET ORAL at 07:57

## 2018-05-05 RX ADMIN — ACETAMINOPHEN SCH MG: 325 TABLET, FILM COATED ORAL at 07:53

## 2018-05-05 RX ADMIN — Medication SCH CAP: at 07:53

## 2018-05-05 RX ADMIN — QUETIAPINE FUMARATE SCH MG: 50 TABLET, FILM COATED ORAL at 07:52

## 2018-05-05 RX ADMIN — LOSARTAN POTASSIUM SCH MG: 50 TABLET, FILM COATED ORAL at 07:53

## 2018-05-05 RX ADMIN — LAMOTRIGINE SCH MG: 150 TABLET ORAL at 07:53

## 2018-05-05 RX ADMIN — FLUTICASONE PROPIONATE SCH SPRAY: 50 SPRAY, METERED NASAL at 07:56

## 2018-05-05 RX ADMIN — DICYCLOMINE HYDROCHLORIDE SCH MG: 10 CAPSULE ORAL at 07:52

## 2018-05-05 RX ADMIN — PANTOPRAZOLE SODIUM SCH MG: 40 TABLET, DELAYED RELEASE ORAL at 07:53

## 2018-05-05 NOTE — PN
DATE:  05/03/2018



This is a late entry 05/03, covers elements not covered in my initial note of

05/03.



SUBJECTIVE:  I met with  the patient in the evening and staffed at treatment

team meeting with the entire team in the morning and the patient's daughter

Kate, who is her DPOA, attended the morning conference.  Discussed the

patient's diagnosis, progress, current medications at some length and discussed

the change in the Trileptal dosage, which was adjusted post-Trileptal level

being somewhat elevated and answered the daughter's questions including the

Seroquel, which we discussed could be tapered when she had been stable back at

the facility for some time.  The day before, the patient had x-ray of the

abdomen which showed large amount of bowel movement accumulation and she did

have a BM, was better after that.  Appetite 90%.  Sleeping 6-7 hours.  Overall,

mood lability is much better.



His meds have not needed to be syringed.



REVIEW OF SYSTEMS:  Ambulation impaired, in wheelchair.  No CV, , pulmonary,

eye system symptoms on review.



MENTAL STATUS EXAM:  Oriented to herself, situation.  Speech coherent,

abstraction fair, computation impaired, language function intact.  Mood and

affect showing improvement.



LABORATORY DATA:  Reviewed.



IMPRESSION:  Unchanged from initial note.



PLAN:  Continue current psychotropics.





______________________________

MAN IRINEO DANIEL MD



DR:  REMY/cherrie  JOB#:  [id number] / [id number]

DD:  05/04/2018 17:25  DT:  05/05/2018 17:03

## 2018-05-05 NOTE — PN
DATE:  05/02/2018



PSYCHIATRIC PROGRESS NOTE



This late entry 05/02/2018 covers elements not covered in my initial note

05/02/2018.



SUBJECTIVE:  I met with the patient in the evening of 05/02/2018.  Overall, the

patient is less anxious, less labile.  She gets scheduled Tylenol for her pain. 

She has been irritable at times, but redirects better.



REVIEW OF SYSTEMS:  Ambulation impaired.  No CV, , pulmonary, eye system

symptoms on review.  She is in a wheelchair.



MENTAL STATUS EXAM:  Oriented to herself and situation.  Speech is coherent,

less pressured, less irritable, less labile.  Abstraction fair, computation

impaired, language function intact.  Mood and affect showing improvement.



LABORATORIES:  Reviewed.



IMPRESSION:  Unchanged from initial note.



PLAN:  Continue psychotropics mentioned in my initial note.  Trileptal dosage

was reduced since the blood level was slightly above the upper limit of

therapeutic at 44 with a therapeutic limit of 35.





______________________________

NEGAR DANIEL MD



DR:  REMY/cherrie  JOB#:  [id number] / [id number]

DD:  05/04/2018 16:41  DT:  05/05/2018 04:46

## 2018-05-06 NOTE — PN
DATE:  05/05/2018



SUBJECTIVE:  The patient has not had any recurrent seizure; however, she has

been very combative this morning.  She became more agitated, restless and

refused taking her medications; however, she denies headaches or any other new

medical or neurological complaints.  The patient refused the EEG, which I

ordered yesterday.



OBJECTIVE:

GENERAL:  Well-developed, well-nourished female, not in acute distress.

VITAL SIGNS:  Afebrile, blood pressure 155/66, respiratory 20, pulse is 59,

temperature 98.5, oxygen saturation 95% on room air.

HEENT:  Normocephalic, atraumatic.

NECK:  Supple, negative for carotid bruit, lymphadenopathy or thyromegaly.

LUNGS:  Clear to A and P.

CARDIOVASCULAR:  Regular rate and rhythm, normal S1, S2.

ABDOMEN:  Soft.

EXTREMITIES:  Negative for cyanosis, clubbing or pitting edema.

NEUROLOGIC:  The patient is alert and oriented x 3.  Speech is fluent.  There is

no language dysfunction.  Further evaluation is limited because the patient is

restless and somewhat agitated.  Cranial nerves are grossly intact.  No focal

motor or sensory deficit.  Sensory examination:  Normal to pinprick and light

touch senses throughout.  Deep tendon reflexes were symmetric and hypoactive

with absent acute responses.  Gait:  The patient refused to stand up and walk. 

She confined to a wheelchair.



IMPRESSION:

1. History of seizure disorder, etiology uncertain; however, the patient

describes 2 kinds of seizure, petit mal and grand mal.  The patient tends to

have more petit mal than grand mal as observed by nursing staff.

2. Multiple medical problems include depressions, anxiety, hyperlipidemia,

hypertension, chronic obstructive pulmonary disease.

3. Multiple psychiatric problems include major depression and possible early

dementia.



RECOMMENDATIONS:  We will try to obtain an EEG.  Continue with current medical

and psychiatric care.





______________________________

HOLLY CAAL MD



DR:  RE/cherrie  JOB#:  [id number] / [id number]

DD:  05/05/2018 13:17  DT:  05/06/2018 09:33

## 2018-05-08 NOTE — PN
DATE:  05/04/2018



This is a late entry of 05/04/2018 covers elements not covered in my initial

note of 05/04/2018.



SUBJECTIVE:  I met with the patient in the evening.  The patient has refused

some of her medications.  Ativan is being tapered gradually and a taper schedule

will be left as part of discharge instructions.  She slept 4-3/4 hours.



REVIEW OF SYSTEMS:  Ambulation impaired, in wheelchair.  No CV, , pulmonary,

eye system symptoms on review.



MENTAL STATUS EXAM:  Oriented to herself and situation.  Speech has some

latency, coherent.  Abstraction fair, computation impaired, language function

intact.  Attention span short.  Short term memory is impaired.



IMPRESSION:  Unchanged.



PLAN:  Continue current psychotropics.





______________________________

MAN IRINEO DANIEL MD



DR:  REMY/cherrie  JOB#:  [id number] / [id number]

DD:  05/07/2018 16:39  DT:  05/08/2018 04:53

## 2018-05-08 NOTE — DS
DATE OF DISCHARGE:  05/05/2018



DISCHARGE SUMMARY/PSYCHIATRIC PROGRESS NOTE



This is a late entry for date of service 05/05/2018 and covers the elements not

covered in my initial note of 05/05/2018.



REASON FOR ADMISSION:  Please refer to the admission history for details. 

Briefly, the patient is a 72-year-old  female referred to us from [facility] where she presented from [facility] nursing [facility]

at [place] on account of increasing combative behaviors, hitting and scratching.

 She was verbally abusive towards staff, banged their head on walls and made

suicidal statements.  She had suicidal ideation, was very needy, disruptive and

attention seeking, had failed outpatient psychiatric interventions resulting in

this referral.



SIGNIFICANT FINDINGS AND CLINICAL COURSE:  Following admission, the patient was

seen daily individually by myself, followed medically per Dr. Nelson/Dr. Garrison.

 The patient is noted to be confused, forgetful, quite depressed, labile, angry,

irritable, extremely abrasive, disruptive.  Adjustments were made in her

psychotropics and she seemed to respond to a combination of Aricept 5 mg a day,

Ativan ____ was being tapered gradually since she was on a fairly high dosage at

admission.  She is also on Zyprexa p.r.n., Seroquel was adjusted to 50 mg 3

times a day and I discussed with her daughter, Jenn, prior to discharge that once

the patient is stable back at her next facility or outpatient provider may

consider tapering off the Seroquel.  She is also on Trileptal 600 mg in a.m. and

450 mg at bedtime and this was reduced from 600 mg twice a day since the blood

level was elevated beyond the upper limit of the therapeutic level on the 600

b.i.d.  Lamictal was 150 mg b.i.d.  The patient was also quite obsessive,

anxious.  Luvox was added and was at 75 mg at bedtime prior to discharge. 

Gradually, mood appeared to improve.  No suicidal or homicidal ideation prior to

discharge, much less disruptive, seemed cognitively more intact.



REVIEW OF SYSTEMS:  Prior to discharge ambulation impaired, in wheelchair.  No

CV, , pulmonary, eye system symptoms on review.



MENTAL STATUS EXAM:  Oriented to herself and situation.  Speech moderate

latency, often responses monosyllabic.  Abstraction fair, computation impaired,

language function intact, attention span short.  Mood and affect improved. 

Short-term memory was impaired.



LABORATORY DATA:  Reviewed.



FINAL DIAGNOSES:  Major depressive disorder with psychotic features, obsessive

compulsive disorder; major neurocognitive disorder, Alzheimer, vascular with

delusion, depression in partial remission; anxiety disorder, unspecified;

impulse control disorder, unspecified.  Rest unchanged from admission.



DISCHARGE MEDICATIONS:  Please refer to the EMRAD.  We had recommended placement

in lower level of care facility prior to discharge, but the family opted to take

her home with outpatient followup and social service staff coordinated all of

this.  Again, Seroquel could be tapered as an outpatient once she has been

stable for a while.





______________________________

NEGAR DANIEL MD



DR:  REMY/cherrie  JOB#:  [id number] / [id number]

DD:  05/07/2018 12:06  DT:  05/07/2018 21:35